# Patient Record
Sex: MALE | Race: WHITE | NOT HISPANIC OR LATINO | URBAN - METROPOLITAN AREA
[De-identification: names, ages, dates, MRNs, and addresses within clinical notes are randomized per-mention and may not be internally consistent; named-entity substitution may affect disease eponyms.]

---

## 2021-07-13 ENCOUNTER — OUTPATIENT (OUTPATIENT)
Dept: OUTPATIENT SERVICES | Facility: HOSPITAL | Age: 66
LOS: 1 days | End: 2021-07-13
Payer: MEDICARE

## 2021-07-13 ENCOUNTER — APPOINTMENT (OUTPATIENT)
Dept: MRI IMAGING | Facility: HOSPITAL | Age: 66
End: 2021-07-13
Payer: MEDICARE

## 2021-07-13 DIAGNOSIS — Z98.89 OTHER SPECIFIED POSTPROCEDURAL STATES: Chronic | ICD-10-CM

## 2021-07-13 DIAGNOSIS — M25.561 PAIN IN RIGHT KNEE: ICD-10-CM

## 2021-07-13 DIAGNOSIS — Z96.652 PRESENCE OF LEFT ARTIFICIAL KNEE JOINT: Chronic | ICD-10-CM

## 2021-07-13 DIAGNOSIS — M25.469 EFFUSION, UNSPECIFIED KNEE: ICD-10-CM

## 2021-07-13 PROCEDURE — 73721 MRI JNT OF LWR EXTRE W/O DYE: CPT

## 2021-07-13 PROCEDURE — 73721 MRI JNT OF LWR EXTRE W/O DYE: CPT | Mod: 26,RT

## 2021-07-27 ENCOUNTER — NON-APPOINTMENT (OUTPATIENT)
Age: 66
End: 2021-07-27

## 2021-08-06 ENCOUNTER — NON-APPOINTMENT (OUTPATIENT)
Age: 66
End: 2021-08-06

## 2021-08-06 ENCOUNTER — APPOINTMENT (OUTPATIENT)
Dept: ORTHOPEDIC SURGERY | Facility: CLINIC | Age: 66
End: 2021-08-06
Payer: MEDICARE

## 2021-08-06 PROCEDURE — 73562 X-RAY EXAM OF KNEE 3: CPT | Mod: 50

## 2021-08-06 PROCEDURE — 20610 DRAIN/INJ JOINT/BURSA W/O US: CPT | Mod: RT

## 2021-08-06 PROCEDURE — 99204 OFFICE O/P NEW MOD 45 MIN: CPT | Mod: 25

## 2021-08-06 NOTE — DISCUSSION/SUMMARY
[de-identified] : I discussed the underlying pathophysiology of the patient's condition in great detail with the patient. I went over the patient's x-rays with them in great detail. The extent of the patient’s arthritis was discussed in great detail with them. Various treatment modalities including cortisone injections, viscosupplementation, and surgical intervention were discussed as solutions for the patient's symptoms. The patient elected to receive a cortisone injection into his right knee today, and tolerated it well. I instructed the patient on ROM exercises, and told them to take it easy. The use of ice and rest was reviewed with the patient. The patient may resume activities tomorrow. We are requesting authorization for Monovis for the right knee. All of his questions were answered. He understands and consents to the plan.\par \par FU 2-3 weeks.\par after a right knee cortisone injection today (08/06/2021)\par after viscosupplementation authorization.

## 2021-08-06 NOTE — HISTORY OF PRESENT ILLNESS
[de-identified] : 66 year old male presents complaining of right knee pain that started 1 week ago. He denies injury. He states that he woke up with swelling and could not comfortably bend his knee. He states that weightbearing and sleeping were very uncomfortable. He has been icing and resting. His swelling and pain have improved. He still has stiffness that worsens at night. He denies buckling. He had an MRI. Of note, his left knee was replaced by Dr. Garcia about 2016. \par \par MRI of the RIGHT knee obtained at [facility] on 07/13/21 revealed:\par 1. Moderate to severe patellofemoral and mild to moderate medial tibiofemoral compartment arthrosis.\par 2. Moderate knee joint effusion with synovitis. Loose intra-articular bodies within the posterior recess of the knee located posterior the posterior cruciate ligament.\par 3. Prominent intrasubstance degeneration within the posterior horn of the medial meniscus which does not definitively contact an articular surface to constitute a tear.

## 2021-08-06 NOTE — ADDENDUM
[FreeTextEntry1] : I, Neeraj Mejias, acted solely as a scribe for Dr. Surya Garcia on this date 08/06/2021.\par All medical record entries made by the Scribe were at my, Dr. Surya Garcia, direction and personally dictated by me on 08/06/2021. I have reviewed the chart and agree that the record accurately reflects my personal performance of the history, physical exam, assessment and plan. I have also personally directed, reviewed, and agreed with the chart.

## 2021-08-06 NOTE — PHYSICAL EXAM
[de-identified] : Constitutional\par o Appearance : well-nourished, well developed, alert, in no acute distress \par Head and Face\par o Head :\par ¦ Inspection : atraumatic, normocephalic\par o Face :\par ¦ Inspection : no visible rash or discoloration\par Respiratory\par o Respiratory Effort: breathing unlabored \par Neurologic\par o Mental Status Examination :\par ¦ Orientation : grossly oriented to person, place and time\par Psychiatric\par o Mood and Affect: mood normal, affect appropriate \par \par Right Lower Extremity\par o Buttock : no tenderness, swelling or deformities \par o Right Hip :\par ¦ Inspection/Palpation : no tenderness, swelling or deformities\par ¦ Range of Motion : full and painless in all planes, no crepitance\par ¦ Stability : joint stability intact\par ¦ Strength : extension, flexion, adduction, abduction, internal rotation and external rotation 5/5 \par \par o Right Knee :\par ¦ Inspection/Palpation : no medial or lateral compartment tenderness to palpation, mild swelling\par ¦ Range of Motion : FROM, full flexion causes discomfort, no crepitance, slightly decreased patellofemoral glide \par ¦ Stability : no valgus or varus instability present on provocative testing\par ¦ Strength : flexion and extension 5/5\par ¦ Tests and Signs : Anterior Drawer negative, Lachman negative, Tisha's negative\par \par Left Lower Extremity\par o Buttock : no tenderness, swelling or deformities \par o Left Hip :\par ¦ Inspection/Palpation : no tenderness, no swelling or deformities\par ¦ Range of Motion : full and painless in all planes, no crepitance\par ¦ Stability : joint stability intact\par ¦ Strength : extension, flexion, adduction, abduction, internal rotation and external rotation 5/5\par \par o Left Knee :\par ¦ Inspection/Palpation : no tenderness to palpation, no swelling, well-healed incisions\par ¦ Range of Motion : 0-135° painless, no crepitance, good patellofemoral glide \par ¦ Stability : no valgus or varus instability present on provocative testing\par ¦ Strength : flexion and extension 5/5\par ¦ Tests and Signs : Anterior Drawer negative\par \par Gait: good alignment of the left knee with full extension, varus deformity of the right knee, no significant extremity swelling or lymphedema\par \par Radiology Results:\par o Right Knee: Standing AP, lateral, tunnel and skyline views were obtained and reveal moderate medial compartment narrowing with mild to moderate patellofemoral arthritis.\par o Left Knee : Standing AP, Lateral and skyline views of the knee were obtained and revealed excellent position of his left total knee replacement with possibly slight subsidence but no loosening. Central tracking of the patella. \par \par o Right Knee injection : Indication- knee osteoarthritis, Anatomic location- right intra-articular joint space, Spray - area was sterilized with Betadine and alcohol and anesthetized with Ethyl Chloride , needle used-20G, Medications given- 5cc's lidocaine, 0.5cc's kenalog, 0.5 cc's dexamethasone, and patient tolerated it well.

## 2021-09-10 ENCOUNTER — EMERGENCY (EMERGENCY)
Facility: HOSPITAL | Age: 66
LOS: 1 days | Discharge: HOME | End: 2021-09-10
Attending: INTERNAL MEDICINE | Admitting: INTERNAL MEDICINE
Payer: MEDICARE

## 2021-09-10 VITALS
HEIGHT: 72 IN | DIASTOLIC BLOOD PRESSURE: 78 MMHG | SYSTOLIC BLOOD PRESSURE: 159 MMHG | RESPIRATION RATE: 17 BRPM | HEART RATE: 72 BPM | TEMPERATURE: 98 F | OXYGEN SATURATION: 97 % | WEIGHT: 199.96 LBS

## 2021-09-10 DIAGNOSIS — Z98.89 OTHER SPECIFIED POSTPROCEDURAL STATES: Chronic | ICD-10-CM

## 2021-09-10 DIAGNOSIS — Z96.652 PRESENCE OF LEFT ARTIFICIAL KNEE JOINT: Chronic | ICD-10-CM

## 2021-09-10 PROCEDURE — 99284 EMERGENCY DEPT VISIT MOD MDM: CPT

## 2021-09-10 PROCEDURE — 99283 EMERGENCY DEPT VISIT LOW MDM: CPT | Mod: 25

## 2021-09-10 PROCEDURE — 96372 THER/PROPH/DIAG INJ SC/IM: CPT

## 2021-09-10 RX ORDER — LIDOCAINE 4 G/100G
1 CREAM TOPICAL
Qty: 15 | Refills: 0
Start: 2021-09-10 | End: 2021-09-24

## 2021-09-10 RX ORDER — LIDOCAINE 4 G/100G
1 CREAM TOPICAL ONCE
Refills: 0 | Status: COMPLETED | OUTPATIENT
Start: 2021-09-10 | End: 2021-09-10

## 2021-09-10 RX ORDER — KETOROLAC TROMETHAMINE 30 MG/ML
30 SYRINGE (ML) INJECTION ONCE
Refills: 0 | Status: DISCONTINUED | OUTPATIENT
Start: 2021-09-10 | End: 2021-09-10

## 2021-09-10 RX ORDER — DIAZEPAM 5 MG
1 TABLET ORAL
Qty: 8 | Refills: 0
Start: 2021-09-10 | End: 2021-09-13

## 2021-09-10 RX ORDER — DIAZEPAM 5 MG
5 TABLET ORAL ONCE
Refills: 0 | Status: DISCONTINUED | OUTPATIENT
Start: 2021-09-10 | End: 2021-09-10

## 2021-09-10 RX ADMIN — Medication 30 MILLIGRAM(S): at 15:24

## 2021-09-10 RX ADMIN — LIDOCAINE 1 PATCH: 4 CREAM TOPICAL at 14:57

## 2021-09-10 RX ADMIN — Medication 30 MILLIGRAM(S): at 14:57

## 2021-09-10 RX ADMIN — Medication 5 MILLIGRAM(S): at 14:58

## 2021-09-10 NOTE — ED PROVIDER NOTE - CLINICAL SUMMARY MEDICAL DECISION MAKING FREE TEXT BOX
67 y/o M h/o Glaucoma pw lower left buttocks pain radiating into hip and upper left thigh x 2 days upon awakening. Denies fever, chills, headache, neck pain, numbness, tingling, bowel/urinary incontinence Denies known injury/trauma, heavy lifting, prior episodes. Took Aleve 5 hours ago without relief. Likely sciatica.   Plan: Will give Toradol, Valium, Lidoderm patch and reassess 67 y/o M h/o Glaucoma pw lower left buttocks pain radiating into hip and upper left thigh x 2 days upon awakening. Denies fever, chills, headache, neck pain, numbness, tingling, bowel/urinary incontinence Denies known injury/trauma, heavy lifting, prior episodes. Took Aleve 5 hours ago without relief. Likely sciatica.   Plan: Will give Toradol, Valium, Lidoderm patch and reassess  **update: significant improvement in pain s/p med administration. patient has an orthopedist he will follow up with. Strict ED return precautions discussed. Patient understands and agrees.

## 2021-09-10 NOTE — ED PROVIDER NOTE - NSFOLLOWUPINSTRUCTIONS_ED_ALL_ED_FT
Follow with your PMD within 48-72 hours.    Rest, no heavy lifting.    Warm compresses to area.  Light walking.   Take Motrin 600 mg every 6-8 hours as needed for pain with food   Valium 5 mg every 12 hours as needed for muscle spasm- caution drowsiness/do not drive.   Worsening, continued or ANY new concerning symptoms return to the emergency department   If your symptoms are not improving you should consult an Orthopedist- you can call: Find a Physician helpline ([phone]) for assistance     Lumbosacral Strain    Lumbosacral strain is an injury that causes pain in the lower back (lumbosacral spine). This injury usually occurs from overstretching the muscles or ligaments along your spine. A strain can affect one or more muscles or cord-like tissues that connect bones to other bones (ligaments).    What are the causes?  This condition may be caused by:  A hard, direct hit (blow) to the back. Excessive stretching of the lower back muscles. This may result from:  A fall. Lifting something heavy. Repetitive movements such as bending or crouching.     What increases the risk?  The following factors may increase your risk of getting this condition:  Participating in sports or activities that involve:  A sudden twist of the back. Pushing or pulling motions. Being overweight or obese. Having poor strength and flexibility, especially tight hamstrings or weak muscles in the back or abdomen. Having too much of a curve in the lower back. Having a pelvis that is tilted forward. What are the signs or symptoms?  The main symptom of this condition is pain in the lower back, at the site of the strain. Pain may extend (radiate) down one or both legs.    How is this diagnosed?  This condition is diagnosed based on:  Your symptoms. Your medical history. A physical exam.  Your health care provider may push on certain areas of your back to determine the source of your pain. You may be asked to bend forward, backward, and side to side to assess the severity of your pain and your range of motion. Imaging tests, such as:  X-rays. MRI.    How is this treated?  Treatment for this condition may include:  Putting heat and cold on the affected area. Medicines to help relieve pain and relax your muscles (muscle relaxants). NSAIDs to help reduce swelling and discomfort. When your symptoms improve, it is important to gradually return to your normal routine as soon as possible to reduce pain, avoid stiffness, and avoid loss of muscle strength. Generally, symptoms should improve within 6 weeks of treatment. However, recovery time varies.    Follow these instructions at home:    Managing pain, stiffness, and swelling     If directed, put ice on the injured area during the first 24 hours after your strain.  Put ice in a plastic bag. Place a towel between your skin and the bag. Leave the ice on for 20 minutes, 2–3 times a day. If directed, put heat on the affected area as often as told by your health care provider. Use the heat source that your health care provider recommends, such as a moist heat pack or a heating pad.  Place a towel between your skin and the heat source. Leave the heat on for 20–30 minutes. Remove the heat if your skin turns bright red. This is especially important if you are unable to feel pain, heat, or cold. You may have a greater risk of getting burned.    Rest and return to your normal activities as told by your health care provider. Ask your health care provider what activities are safe for you.Avoid activities that take a lot of energy for as long as told by your health care provider.General instructions     Take over-the-counter and prescription medicines only as told by your health care provider. Do not drive or use heavy machinery while taking prescription pain medicine. Do not use any products that contain nicotine or tobacco, such as cigarettes and e-cigarettes. If you need help quitting, ask your health care provider. Keep all follow-up visits as told by your health care provider. This is important.How is this prevented?  Use correct form when playing sports and lifting heavy objects. Use good posture when sitting and standing. Maintain a healthy weight. Sleep on a mattress with medium firmness to support your back.Be safe and responsible while being active to avoid falls. Do at least 150 minutes of moderate-intensity exercise each week, such as brisk walking or water aerobics. Try a form of exercise that takes stress off your back, such as swimming or stationary cycling. Maintain physical fitness, including:  Strength.Flexibility.Cardiovascular fitness.Endurance.     Contact a health care provider if:  Your back pain does not improve after 6 weeks of treatment. Your symptoms get worse. Get help right away if:  Your back pain is severe. You cannot stand or walk. You have difficulty controlling when you urinate or when you have a bowel movement. You feel nauseous or you vomit. Your feet get very cold. You have numbness, tingling, weakness, or problems using your arms or legs. You develop any of the following:  Shortness of breath. Dizziness. Pain in your legs. Weakness in your buttocks or legs. Follow with your PMD within 48-72 hours.    Rest, no heavy lifting.    Warm compresses to area.  Light walking.   Take Naproxen 500mg 1 tab 2x/day with food.   Valium 5 mg every 12 hours as needed for muscle spasm- caution drowsiness/do not drive.   Worsening, continued or ANY new concerning symptoms return to the emergency department   If your symptoms are not improving you should consult an Orthopedist- you can call: Find a Physician helpline ([phone]) for assistance       Sciatica    WHAT YOU NEED TO KNOW:    Sciatica is a condition that causes pain along your sciatic nerve. The sciatic nerve runs from your spine through both sides of your buttocks. It then runs down the back of your thigh, into your lower leg and foot. Your sciatic nerve may be compressed, inflamed, irritated, or stretched.    DISCHARGE INSTRUCTIONS:    Medicines:   •NSAIDs: These medicines decrease swelling and pain. NSAIDs are available without a doctor's order. Ask your healthcare provider which medicine is right for you. Ask how much to take and when to take it. Take as directed. NSAIDs can cause stomach bleeding or kidney problems if not taken correctly.      •Acetaminophen: This medicine decreases pain. Acetaminophen is available without a doctor's order. Ask how much to take and when to take it. Follow directions. Acetaminophen can cause liver damage if not taken correctly.      •Muscle relaxers help decrease pain and muscle spasms.      •Take your medicine as directed. Contact your healthcare provider if you think your medicine is not helping or if you have side effects. Tell him or her if you are allergic to any medicine. Keep a list of the medicines, vitamins, and herbs you take. Include the amounts, and when and why you take them. Bring the list or the pill bottles to follow-up visits. Carry your medicine list with you in case of an emergency.      Follow up with your doctor as directed: Write down your questions so you remember to ask them during your visits.     Manage your symptoms:   •Activity: Decrease your activity. Do not lift heavy objects or twist your back for at least 6 weeks. Slowly return to your usual activity.      •Ice: Ice helps decrease swelling and pain. Ice may also help prevent tissue damage. Use an ice pack, or put crushed ice in a plastic bag. Cover it with a towel and place it on your low back or leg for 15 to 20 minutes every hour or as directed.      •Heat: Heat helps decrease pain and muscle spasms. Apply heat on the area for 20 to 30 minutes every 2 hours for as many days as directed.       •Physical therapy: You may need to see a physical therapist to teach you exercises to help improve movement and strength, and to decrease pain. An occupational therapist teaches you skills to help with your daily activities.       •Use assistive devices if directed: You may need to wear back support, such as a back brace. You may need crutches, a cane, or a walker to decrease stress on your lower back and leg muscles. Ask your healthcare provider for more information about assistive devices and how to use them correctly.      Self-care:   •Avoid pressure on your back and legs: Do not lift heavy objects, or stand or sit for long periods of time.      •Lift objects safely: Keep your back straight and bend your knees when you  an object. Do not bend or twist your back when you lift.      •Maintain a healthy weight: Ask your healthcare provider how much you should weigh. Ask him to help you create a weight loss plan if you are overweight.       •Exercise: Ask your healthcare provider about the best stretching, warmup, and exercise plan for you.       Contact your healthcare provider if:   •You have pain in your lower back at night or when resting.      •You have pain in your lower back with numbness below the knee.      •You have weakness in one leg only.      •You have questions or concerns about your condition or care.      Seek care immediately or call 911 if:   •You have trouble holding back your urine or bowel movements.      •You have weakness in both legs.      •You have numbness in your groin or buttocks.

## 2021-09-10 NOTE — ED ADULT NURSE NOTE - NSICDXPASTMEDICALHX_GEN_ALL_CORE_FT
PAST MEDICAL HISTORY:  Elevated liver enzymes s/p tylenol use after TKR    Glaucoma     Knee pain, left s/p TKR 8/15    Osteoarthritis

## 2021-09-10 NOTE — ED PROVIDER NOTE - PATIENT PORTAL LINK FT
You can access the FollowMyHealth Patient Portal offered by [facility] by registering at the following website: [email or web address]. By joining [facility]’s FollowMyHealth portal, you will also be able to view your health information using other applications (apps) compatible with our system.

## 2021-09-10 NOTE — ED ADULT NURSE NOTE - NSICDXPASTSURGICALHX_GEN_ALL_CORE_FT
PAST SURGICAL HISTORY:  S/P colonoscopy with polypectomy benign, 2 years ago    S/P inguinal hernia repair right    S/P knee surgery left knee at age 15    Status post total left knee replacement 8/2015

## 2021-09-10 NOTE — ED ADULT NURSE NOTE - NSICDXFAMILYHX_GEN_ALL_CORE_FT
FAMILY HISTORY:  Father  Still living? No  Family history of heart disease, Age at diagnosis: Age Unknown    Mother  Still living? Unknown  Family history of brain cancer, Age at diagnosis: Age Unknown

## 2021-09-10 NOTE — ED PROVIDER NOTE - ATTENDING CONTRIBUTION TO CARE
67 y/o M h/o Glaucoma pw lower left buttocks pain radiating into hip and upper left thigh x 2 days upon awakening. Denies fever, chills, headache, neck pain, numbness, tingling, bowel/urinary incontinence Denies known injury/trauma, heavy lifting, prior episodes. Took Aleve 5 hours ago without relief. Likely sciatica.   Plan: Will give Toradol, Valium, Lidoderm patch and reassess  **update: significant improvement in pain s/p med administration. patient has an orthopedist he will follow up with. Strict ED return precautions discussed. Patient understands and agrees.  Dr. Guerin:  I have reviewed and discussed with the PA/ resident the case specifics, including the history, physical assessment, evaluation, conclusion, laboratory results, and medical plan. I agree with the contents, and conclusions. I have personally examined, and interviewed the patient.

## 2021-09-10 NOTE — ED PROVIDER NOTE - OBJECTIVE STATEMENT
65 y/o M h/o Glaucoma pw lower left buttocks pain radiating into hip and upper left thigh x 2 days upon awakening. Denies fever, chills, headache, neck pain, numbness, tingling, bowel/urinary incontinence Denies known injury/trauma, heavy lifting, prior episodes. Took Aleve 5 hours ago without relief.

## 2021-09-14 NOTE — CHART NOTE - NSCHARTNOTEFT_GEN_A_CORE
SW met with patient at bedside to assess for social work needs and to complete home assessment of safety.  Patient is a 65 y/o male presenting to ED for back pain.  Patient reports he has 2 steps to enter home with handrail and 0 steps to bedroom.  Patient reports at baseline he is independent and does not use DME.  Currently in ED patient has crutch to assist with ambulating.  Patient reports it has been helping him and he will continue to use crutch while in pain.  Patient is aware he needs to follow up with orthopedist.  SW offered to assist patient with scheduling. SW will contact patient on Monday to follow up and schedule. Patient in agreement.  Patient denies any safety concerns or need for outside referrals.  Fall prevention checklist and suggestions discussed - copy was provided to patient.  SW will contact patient on Monday 9/13/21 at [phone].
SW called patient to discuss and assist with follow up care.  Patient presented to ED on 9/10/21 with back pain.  Patient was instructed to follow up with PMD.  SW spoke with patient who reports still feeling a bit of pain.  Patient reports that he has not scheduled follow up with PMD but has spoken with his orthopedist.  Patient reports he is waiting on a phone call back to see when orthopedist is available to give a shot in his knee.  Patient denies needing any other assistance.  Fall safety and concerns were discussed in ED. No social work assistance is needed at this time.

## 2021-09-30 ENCOUNTER — EMERGENCY (EMERGENCY)
Facility: HOSPITAL | Age: 66
LOS: 1 days | Discharge: HOME | End: 2021-09-30
Attending: EMERGENCY MEDICINE | Admitting: EMERGENCY MEDICINE
Payer: MEDICARE

## 2021-09-30 VITALS
SYSTOLIC BLOOD PRESSURE: 148 MMHG | HEART RATE: 61 BPM | TEMPERATURE: 98 F | OXYGEN SATURATION: 98 % | DIASTOLIC BLOOD PRESSURE: 84 MMHG | RESPIRATION RATE: 17 BRPM

## 2021-09-30 VITALS
DIASTOLIC BLOOD PRESSURE: 105 MMHG | RESPIRATION RATE: 18 BRPM | HEIGHT: 72 IN | WEIGHT: 199.96 LBS | HEART RATE: 60 BPM | TEMPERATURE: 98 F | OXYGEN SATURATION: 95 % | SYSTOLIC BLOOD PRESSURE: 176 MMHG

## 2021-09-30 DIAGNOSIS — M54.42 LUMBAGO WITH SCIATICA, LEFT SIDE: ICD-10-CM

## 2021-09-30 DIAGNOSIS — M25.552 PAIN IN LEFT HIP: ICD-10-CM

## 2021-09-30 DIAGNOSIS — Z96.652 PRESENCE OF LEFT ARTIFICIAL KNEE JOINT: Chronic | ICD-10-CM

## 2021-09-30 DIAGNOSIS — Z98.89 OTHER SPECIFIED POSTPROCEDURAL STATES: Chronic | ICD-10-CM

## 2021-09-30 DIAGNOSIS — H40.9 UNSPECIFIED GLAUCOMA: ICD-10-CM

## 2021-09-30 DIAGNOSIS — Z96.652 PRESENCE OF LEFT ARTIFICIAL KNEE JOINT: ICD-10-CM

## 2021-09-30 PROCEDURE — 99283 EMERGENCY DEPT VISIT LOW MDM: CPT

## 2021-09-30 PROCEDURE — 73502 X-RAY EXAM HIP UNI 2-3 VIEWS: CPT | Mod: 26,LT

## 2021-09-30 PROCEDURE — 73502 X-RAY EXAM HIP UNI 2-3 VIEWS: CPT

## 2021-09-30 PROCEDURE — 99284 EMERGENCY DEPT VISIT MOD MDM: CPT

## 2021-09-30 RX ORDER — LIDOCAINE 4 G/100G
1 CREAM TOPICAL ONCE
Refills: 0 | Status: COMPLETED | OUTPATIENT
Start: 2021-09-30 | End: 2021-09-30

## 2021-09-30 RX ORDER — CYCLOBENZAPRINE HYDROCHLORIDE 10 MG/1
5 TABLET, FILM COATED ORAL ONCE
Refills: 0 | Status: COMPLETED | OUTPATIENT
Start: 2021-09-30 | End: 2021-09-30

## 2021-09-30 RX ORDER — LIDOCAINE 4 G/100G
1 CREAM TOPICAL
Qty: 7 | Refills: 0
Start: 2021-09-30 | End: 2021-10-06

## 2021-09-30 RX ORDER — OXYCODONE AND ACETAMINOPHEN 5; 325 MG/1; MG/1
1 TABLET ORAL ONCE
Refills: 0 | Status: DISCONTINUED | OUTPATIENT
Start: 2021-09-30 | End: 2021-09-30

## 2021-09-30 RX ADMIN — LIDOCAINE 1 PATCH: 4 CREAM TOPICAL at 11:06

## 2021-09-30 RX ADMIN — OXYCODONE AND ACETAMINOPHEN 1 TABLET(S): 5; 325 TABLET ORAL at 11:06

## 2021-09-30 RX ADMIN — OXYCODONE AND ACETAMINOPHEN 1 TABLET(S): 5; 325 TABLET ORAL at 11:53

## 2021-09-30 RX ADMIN — CYCLOBENZAPRINE HYDROCHLORIDE 5 MILLIGRAM(S): 10 TABLET, FILM COATED ORAL at 11:08

## 2021-09-30 NOTE — ED PROVIDER NOTE - PHYSICAL EXAMINATION
msk: pelvis stable. FROM of the left hip. No point TTP. With ambulation and SLR pt reports pain in the left hip radiating down the leg

## 2021-09-30 NOTE — ED PROVIDER NOTE - CLINICAL SUMMARY MEDICAL DECISION MAKING FREE TEXT BOX
67 y/o M h/o Glaucoma presents to the ED with c/o sharp shooting pain from the left hip down the left thigh and lower leg x 3 weeks. Pt was seen in the ED 3 weeks ago when it started and was given meds, which helped temporarily. He has followed up with pain management, reports he had an xray of his lower back which was normal and was given flexeril, medrol dose pack and naprosyn, which doesn't help, he was also given "3 injections" in the left hip yesterday (unsure of the medication names) which did not help with his pain. Denies fever, chills, headache, neck pain, numbness, tingling, bowel/urinary incontinence. Denies known injury/trauma, heavy lifting, prior episodes. PE as noted above. hip XR pending. pain control. reassess 67 y/o M h/o Glaucoma presents to the ED with c/o sharp shooting pain from the left hip down the left thigh and lower leg x 3 weeks. Pt was seen in the ED 3 weeks ago when it started and was given meds, which helped temporarily. He has followed up with pain management, reports he had an xray of his lower back which was normal and was given flexeril, medrol dose pack and naprosyn, which doesn't help, he was also given "3 injections" in the left hip yesterday (unsure of the medication names) which did not help with his pain. Denies fever, chills, headache, neck pain, numbness, tingling, bowel/urinary incontinence. Denies known injury/trauma, heavy lifting, prior episodes. PE as noted above. hip XR pending. pain control. reassess    1232pm: XR images reviewed with Dr. Valencia- bebe rousseau. pts pain is improved with percocet and lidoderm patch. pt has an appt with PT tomorrow and his orthopedic Dr. Garcia on 10/7. MARY flores will try to get his appt moved up. He also has an appt for MRI upcoming scheduled by his pain management 65 y/o M h/o Glaucoma presents to the ED with c/o sharp shooting pain from the left hip down the left thigh and lower leg x 3 weeks. Pt was seen in the ED 3 weeks ago when it started and was given meds, which helped temporarily. He has followed up with pain management, reports he had an xray of his lower back which was normal and was given flexeril, medrol dose pack and naprosyn, which doesn't help, he was also given "3 injections" in the left hip yesterday (unsure of the medication names) which did not help with his pain. Denies fever, chills, headache, neck pain, numbness, tingling, bowel/urinary incontinence. Denies known injury/trauma, heavy lifting, prior episodes. PE as noted above. hip XR pending. pain control. reassess    1232pm: XR images reviewed with Dr. Valencia- bebe rousseau. pts pain is improved with percocet and lidoderm patch. pt has an appt with PT tomorrow and his orthopedic Dr. Garcia on 10/7. MARY flores will try to get his appt moved up. He also has an appt for MRI upcoming scheduled by his pain management    Appt with Hip Specialist made by Ebony. Pt made aware.

## 2021-09-30 NOTE — ED ADULT TRIAGE NOTE - CHIEF COMPLAINT QUOTE
Pt c/o lower back pain radiating to left leg. Pt c/o lower back pain radiating to left leg. ISAR Negative.

## 2021-09-30 NOTE — ED PROVIDER NOTE - ATTENDING CONTRIBUTION TO CARE
Christiano with MARYJANE Phillips. 67 y/o M h/o Glaucoma presents to the ED with c/o sharp shooting pain from the left hip down the left thigh and lower leg x 3 weeks. Pt was seen in the ED 3 weeks ago when it started and was given meds, which helped temporarily. He has followed up with pain management, reports he had an xray of his lower back which was normal and was given flexeril, medrol dose pack and naprosyn, which doesn't help, he was also given "3 injections" in the left hip yesterday (unsure of the medication names) which did not help with his pain. Denies fever, chills, headache, neck pain, numbness, tingling, bowel/urinary incontinence. Denies known injury/trauma, heavy lifting, prior episodes. PE as noted above. hip XR pending. pain control. reassess    1232pm: XR images reviewed with Dr. Valencia- bebe rousseau. pts pain is improved with percocet and lidoderm patch. pt has an appt with PT tomorrow and his orthopedic Dr. Garcia on 10/7. MARY flores will try to get his appt moved up. He also has an appt for MRI upcoming scheduled by his pain management    Appt with Hip Specialist made by Ebony. Pt made aware.     I performed a face to face bedside interview with patient regarding history of present illness, review of symptoms and past medical history. I completed an independent physical exam.  I have discussed the patient's plan of care with Physician Assistant (PA). I agree with note as stated above, having amended the EMR as needed to reflect my findings.   This includes History of Present Illness, HIV, Past Medical/Surgical/Family/Social History, Allergies and Home Medications, Review of Systems, Physical Exam, and any Progress Notes during the time I functioned as the attending physician for this patient.

## 2021-09-30 NOTE — ED PROVIDER NOTE - NSFOLLOWUPINSTRUCTIONS_ED_ALL_ED_FT
Follow up with your orthopedic specialist as scheduled. Follow up with your physical therapy tomorrow as scheduled     Take the prescribed medication as directed     Stay hydrated    Return to the ER if your symptoms worsen or for any other medical emergencies  ******************    Sciatica       Sciatica is pain, weakness, tingling, or loss of feeling (numbness) along the sciatic nerve. The sciatic nerve starts in the lower back and goes down the back of each leg. Sciatica usually goes away on its own or with treatment. Sometimes, sciatica may come back (recur).      What are the causes?  This condition happens when the sciatic nerve is pinched or has pressure put on it. This may be the result of:  •A disk in between the bones of the spine bulging out too far (herniated disk).      •Changes in the spinal disks that occur with aging.      •A condition that affects a muscle in the butt.      •Extra bone growth near the sciatic nerve.      •A break (fracture) of the area between your hip bones (pelvis).      •Pregnancy.       •Tumor. This is rare.        What increases the risk?  You are more likely to develop this condition if you:  •Play sports that put pressure or stress on the spine.      •Have poor strength and ease of movement (flexibility).      •Have had a back injury in the past.      •Have had back surgery.      •Sit for long periods of time.      •Do activities that involve bending or lifting over and over again.      •Are very overweight (obese).        What are the signs or symptoms?  Symptoms can vary from mild to very bad. They may include:•Any of these problems in the lower back, leg, hip, or butt:  •Mild tingling, loss of feeling, or dull aches.      •Burning sensations.      •Sharp pains.         •Loss of feeling in the back of the calf or the sole of the foot.      •Leg weakness.       •Very bad back pain that makes it hard to move.      These symptoms may get worse when you cough, sneeze, or laugh. They may also get worse when you sit or stand for long periods of time.      How is this treated?  This condition often gets better without any treatment. However, treatment may include:  •Changing or cutting back on physical activity when you have pain.      •Doing exercises and stretching.      •Putting ice or heat on the affected area.    •Medicines that help:   •To relieve pain and swelling.       •To relax your muscles.         •Shots (injections) of medicines that help to relieve pain, irritation, and swelling.      •Surgery.        Follow these instructions at home:    Medicines     •Take over-the-counter and prescription medicines only as told by your doctor.    •Ask your doctor if the medicine prescribed to you:  •Requires you to avoid driving or using heavy machinery.    •Can cause trouble pooping (constipation). You may need to take these steps to prevent or treat trouble pooping:  •Drink enough fluids to keep your pee (urine) pale yellow.      •Take over-the-counter or prescription medicines.      •Eat foods that are high in fiber. These include beans, whole grains, and fresh fruits and vegetables.      •Limit foods that are high in fat and sugar. These include fried or sweet foods.            Managing pain                 •If told, put ice on the affected area.  •Put ice in a plastic bag.      •Place a towel between your skin and the bag.      •Leave the ice on for 20 minutes, 2–3 times a day.      •If told, put heat on the affected area. Use the heat source that your doctor tells you to use, such as a moist heat pack or a heating pad.  •Place a towel between your skin and the heat source.      •Leave the heat on for 20–30 minutes.      •Remove the heat if your skin turns bright red. This is very important if you are unable to feel pain, heat, or cold. You may have a greater risk of getting burned.          Activity      •Return to your normal activities as told by your doctor. Ask your doctor what activities are safe for you.      •Avoid activities that make your symptoms worse.    •Take short rests during the day.  •When you rest for a long time, do some physical activity or stretching between periods of rest.      •Avoid sitting for a long time without moving. Get up and move around at least one time each hour.        •Exercise and stretch regularly, as told by your doctor.    • Do not lift anything that is heavier than 10 lb (4.5 kg) while you have symptoms of sciatica.  •Avoid lifting heavy things even when you do not have symptoms.      •Avoid lifting heavy things over and over.      •When you lift objects, always lift in a way that is safe for your body. To do this, you should:  •Bend your knees.      •Keep the object close to your body.      •Avoid twisting.        General instructions     •Stay at a healthy weight.      •Wear comfortable shoes that support your feet. Avoid wearing high heels.      •Avoid sleeping on a mattress that is too soft or too hard. You might have less pain if you sleep on a mattress that is firm enough to support your back.      •Keep all follow-up visits as told by your doctor. This is important.        Contact a doctor if:  •You have pain that:  •Wakes you up when you are sleeping.      •Gets worse when you lie down.      •Is worse than the pain you have had in the past.      •Lasts longer than 4 weeks.        •You lose weight without trying.        Get help right away if:    •You cannot control when you pee (urinate) or poop (have a bowel movement).    •You have weakness in any of these areas and it gets worse:  •Lower back.      •The area between your hip bones.      •Butt.      •Legs.        •You have redness or swelling of your back.      •You have a burning feeling when you pee.        Summary    •Sciatica is pain, weakness, tingling, or loss of feeling (numbness) along the sciatic nerve.      •This condition happens when the sciatic nerve is pinched or has pressure put on it.      •Sciatica can cause pain, tingling, or loss of feeling (numbness) in the lower back, legs, hips, and butt.      •Treatment often includes rest, exercise, medicines, and putting ice or heat on the affected area.      This information is not intended to replace advice given to you by your health care provider. Make sure you discuss any questions you have with your health care provider.

## 2021-09-30 NOTE — ED PROVIDER NOTE - MDM ORDERS SUBMITTED SELECTION
Imaging Studies/Medications
What Type Of Note Output Would You Prefer (Optional)?: Bullet Format
How Severe Are Your Spot(S)?: mild
Have Your Spot(S) Been Treated In The Past?: has not been treated
Hpi Title: Evaluation of Skin Lesions

## 2021-09-30 NOTE — ED PROVIDER NOTE - PATIENT PORTAL LINK FT
You can access the FollowMyHealth Patient Portal offered by [facility] by registering at the following website: [email or web address]. By joining [facility]’s FollowMyHealth portal, you will also be able to view your health information using other applications (apps) compatible with our system.

## 2021-09-30 NOTE — ED PROVIDER NOTE - OBJECTIVE STATEMENT
67 y/o M h/o Glaucoma presents to the ED with c/o sharp shooting pain from the left hip down the left thigh and lower leg x 3 weeks. Pt was seen in the ED 3 weeks ago when it started and was given meds, which helped temporarily. He has followed up with pain management, reports he had an xray of his lower back which was normal and was given flexeril, medrol dose pack and naprosyn, which doesn't help, he was also given "3 injections" in the left hip yesterday (unsure of the medication names) which did not help with his pain. Denies fever, chills, headache, neck pain, numbness, tingling, bowel/urinary incontinence. Denies known injury/trauma, heavy lifting, prior episodes.

## 2021-10-01 ENCOUNTER — APPOINTMENT (OUTPATIENT)
Dept: ORTHOPEDIC SURGERY | Facility: CLINIC | Age: 66
End: 2021-10-01
Payer: MEDICARE

## 2021-10-01 DIAGNOSIS — M16.12 UNILATERAL PRIMARY OSTEOARTHRITIS, LEFT HIP: ICD-10-CM

## 2021-10-01 DIAGNOSIS — M54.5 LOW BACK PAIN: ICD-10-CM

## 2021-10-01 PROCEDURE — 72100 X-RAY EXAM L-S SPINE 2/3 VWS: CPT

## 2021-10-01 PROCEDURE — 73502 X-RAY EXAM HIP UNI 2-3 VIEWS: CPT | Mod: LT

## 2021-10-01 PROCEDURE — 99214 OFFICE O/P EST MOD 30 MIN: CPT

## 2021-10-04 NOTE — CHART NOTE - NSCHARTNOTEFT_GEN_A_CORE
SW called patient to discuss and assist with follow up care.  Patient presented to ED on 9/30/21 with hip pain.  While in ED,  MARY scheduled patient follow up appointment for 10/1/21 at 4 PM with Dr Choe located at [address] [place] [address].  Patient in agreement to attend appointment.  Patient did not answer follow up call.    According to SASHA, patient attended appointment on 10/1/21.  Patient also completed MRI on 10/2/21 and has another follow up appointment scheduled for 10/5/21.
SW met with patient at bedside to assess for social work needs.  Patient is a 66 year old male presenting to ED with hip pain.  SW had met with patient during ED visit 3 weeks ago and completed home assessment of safety.  Patient denies any changes in safety concerns.  Patient denies needing any resources or referrals.  MARY spoke with Dr Valencia and MARYJANE Phillips regarding follow up care.  Patient is to follow up with orthopedist.  SW spoke with patient about SW scheduling appointment for patient before DC.  Patient in agreement.  Patient states he has an appointment with orthopedist Dr Garcia on 10/7/21 for his knee but does not want to wait that long for his hip.  Patient requested to go to same practice but is open to seeing another Dr for earlier availability.  MARY called [facility] [place] [phone].  MARY scheduled patient follow up appointment for 10/1/21 at 4 PM with Dr Choe located at [address] [place] [address].  Patient in agreement to attend appointment; appointment card given to patient.  ED provider made aware of appointment. SW will follow up to confirm patient attended appointment.

## 2021-10-06 PROBLEM — M16.12 PRIMARY OSTEOARTHRITIS OF LEFT HIP: Status: ACTIVE | Noted: 2021-10-01

## 2021-10-07 ENCOUNTER — APPOINTMENT (OUTPATIENT)
Dept: ORTHOPEDIC SURGERY | Facility: CLINIC | Age: 66
End: 2021-10-07
Payer: MEDICARE

## 2021-10-07 PROCEDURE — 20611 DRAIN/INJ JOINT/BURSA W/US: CPT | Mod: RT

## 2021-10-07 NOTE — DISCUSSION/SUMMARY
[de-identified] : I went over the pathophysiology of the patient's symptoms in great detail with the patient. The patient elected to receive a Durolane injection into his right knee today, and tolerated it well. I instructed the pt on ROM exercises, and told them to take it easy. The use of ice and rest was reviewed with the patient. The patient may resume activities tomorrow. I reminded the patient that it takes 4 to 6 weeks after the injection to feel symptom relief. All of his questions were answered. He understands and consents to the plan. \par \par FU 6 weeks.\par after a right knee Durolane injection today (10/07/2021).

## 2021-10-07 NOTE — ADDENDUM
[FreeTextEntry1] : I, Neeraj Mejias, acted solely as a scribe for Dr. Surya Garcia on this date 10/07/2021.\par All medical record entries made by the Scribe were at my, Dr. Surya Garcia, direction and personally dictated by me on 10/07/2021. I have reviewed the chart and agree that the record accurately reflects my personal performance of the history, physical exam, assessment and plan. I have also personally directed, reviewed, and agreed with the chart.

## 2021-10-07 NOTE — HISTORY OF PRESENT ILLNESS
[de-identified] : 66 year old male presents with right knee pain. It started 5 weeks ago without injury. He states that he woke up with swelling and could not comfortably bend his knee. Weightbearing and sleeping were very uncomfortable. He denies buckling. He received a cortisone injection on 8/6/2021 and thinks that it helped. He presents for a Durolane injection today (10/07/2021). He notes that 1 week after his cortisone injection his back pain flared up. He is seeing Dr. Marshall tomorrow for this. Pmhx: His left knee was replaced by Dr. Garcia about 2016.\par \par Radiology Results taken 8/6/2021:\par o Right Knee: Standing AP, lateral, tunnel and skyline views were obtained and reveal moderate medial compartment narrowing with mild to moderate patellofemoral arthritis.\par o Left Knee : Standing AP, Lateral and skyline views of the knee were obtained and revealed excellent position of his left total knee replacement with possibly slight subsidence but no loosening. Central tracking of the patella. \par \par MRI of the RIGHT knee obtained at [facility] on 07/13/21 revealed:\par 1. Moderate to severe patellofemoral and mild to moderate medial tibiofemoral compartment arthrosis.\par 2. Moderate knee joint effusion with synovitis. Loose intra-articular bodies within the posterior recess of the knee located posterior the posterior cruciate ligament.\par 3. Prominent intrasubstance degeneration within the posterior horn of the medial meniscus which does not definitively contact an articular surface to constitute a tear.

## 2021-10-07 NOTE — PHYSICAL EXAM
[de-identified] : Constitutional\par o Appearance : well-nourished, well developed, alert, in no acute distress \par Head and Face\par o Head :\par ¦ Inspection : atraumatic, normocephalic\par o Face :\par ¦ Inspection : no visible rash or discoloration\par Respiratory\par o Respiratory Effort: breathing unlabored \par Neurologic\par o Mental Status Examination :\par ¦ Orientation : grossly oriented to person, place and time\par Psychiatric\par o Mood and Affect: mood normal, affect appropriate \par \par Right Lower Extremity\par o Buttock : no tenderness, swelling or deformities \par o Right Hip :\par ¦ Inspection/Palpation : no tenderness, swelling or deformities\par ¦ Range of Motion : full and painless in all planes, no crepitance\par ¦ Stability : joint stability intact\par ¦ Strength : extension, flexion, adduction, abduction, internal rotation and external rotation 5/5 \par \par o Right Knee :\par ¦ Inspection/Palpation : no medial or lateral compartment tenderness to palpation, no  swelling\par ¦ Range of Motion : FROM, full flexion causes  mild discomfort, no crepitance, slightly decreased patellofemoral glide \par ¦ Stability : no valgus or varus instability present on provocative testing\par ¦ Strength : flexion and extension 5/5\par ¦ Tests and Signs : Anterior Drawer negative, Lachman negative, Tisha's negative\par \par Left Lower Extremity\par o Buttock : no tenderness, swelling or deformities \par o Left Hip :\par ¦ Inspection/Palpation : no tenderness, no swelling or deformities\par ¦ Range of Motion : full and painless in all planes, no crepitance\par ¦ Stability : joint stability intact\par ¦ Strength : extension, flexion, adduction, abduction, internal rotation and external rotation 5/5\par \par o Left Knee :\par ¦ Inspection/Palpation : no tenderness to palpation, no swelling, well-healed incisions\par ¦ Range of Motion : 0-135° painless, no crepitance, good patellofemoral glide \par ¦ Stability : no valgus or varus instability present on provocative testing\par ¦ Strength : flexion and extension 5/5\par ¦ Tests and Signs : Anterior Drawer negative\par \par Gait: good alignment of the left knee with full extension, varus deformity of the right knee, no significant extremity swelling or lymphedema\par \par o Right Knee injection : Indication- knee osteoarthritis, Anatomic location- right intra-articular joint space, Spray - area was sterilized with Betadine and alcohol and anesthetized with Ethyl Chloride, needle used-20G, Medications given- 3cc's Durolane under Ultrasound guidance. \par Go# [id number]   o Exp: 2023-12-31

## 2021-10-08 ENCOUNTER — APPOINTMENT (OUTPATIENT)
Dept: ORTHOPEDIC SURGERY | Facility: CLINIC | Age: 66
End: 2021-10-08
Payer: MEDICARE

## 2021-10-08 VITALS
WEIGHT: 200 LBS | HEIGHT: 72 IN | BODY MASS INDEX: 27.09 KG/M2 | DIASTOLIC BLOOD PRESSURE: 85 MMHG | SYSTOLIC BLOOD PRESSURE: 213 MMHG | HEART RATE: 72 BPM

## 2021-10-08 DIAGNOSIS — Z86.69 PERSONAL HISTORY OF OTHER DISEASES OF THE NERVOUS SYSTEM AND SENSE ORGANS: ICD-10-CM

## 2021-10-08 PROCEDURE — 72100 X-RAY EXAM L-S SPINE 2/3 VWS: CPT

## 2021-10-08 PROCEDURE — 99215 OFFICE O/P EST HI 40 MIN: CPT

## 2021-10-08 NOTE — HISTORY OF PRESENT ILLNESS
[de-identified] : This 66-year-old retired  had a short-lived episode of sciatic pains 30 years ago.  1 month ago he had the spontaneous onset of pain in the left buttock with radiation to the left posterior thigh and calf.  He has had some very mild back pain.  He has not had right leg pain.  He has had some numbness in the left leg but denies paresthesias or weakness.  The pain is worse with coughing and sneezing but no worse forcing to move his bowels.  He has had night pain.  The pain is no worse sitting or driving but it is slightly worse standing and worse walking.  Treatment has been ice.  He has seen a chiropractor and had injections from someone in their office and Naprosyn and Flexeril were prescribed which he did not take with regularity but without help.  The chiropractor ordered an MRI of the lumbar spine but neither the report or the films are available for review.\par \par He is on eyedrops for glaucoma and has had a prior left total knee replacement and a bilateral inguinal hernia repair. [Pain Location] : pain [Worsening] : worsening [9] : a maximum pain level of 9/10 [Bending] : worsened by bending [Lifting] : worsened by lifting [Prolonged Sitting] : not worsened by prolonged sitting [Prolonged Standing] : worsened by prolonged standing [Sitting] : not worsened by sitting [Standing] : worsened by standing [Walking] : worsened by walking

## 2021-10-08 NOTE — REASON FOR VISIT
[Initial Visit] : an initial visit for [Degenerative Joint Disease] : degenerative joint disease [Herniated Discs] : herniated discs [Radiculopathy] : radiculopathy

## 2021-10-08 NOTE — DISCUSSION/SUMMARY
[Medication Risks Reviewed] : Medication risks reviewed [de-identified] : I recommended rest and moist heat.  The symptoms are most likely secondary to herniated lumbar disc at L5-S1 or possibly L4-5.  He has been started on diclofenac 75 mg twice a day as a nonsteroidal anti-inflammatory and I will see him for follow-up in 3 weeks.  He will return with a copy of his MRI on a disc for my review.  He will call if there are problems with the medication or worsening of his symptoms.  He was planning on leaving for [place] for the winter where he has a place but he has decided against that in light of his current symptoms.

## 2021-10-08 NOTE — PHYSICAL EXAM
[de-identified] : He is fully alert and oriented with a normal mood and affect.  He is in no acute distress as a take the history.  He moves around the examining room with difficulty secondary to left leg pain.  There are no cutaneous abnormalities or palpable bony defects of the spine.  There is a flattening of the normal lumbar lordosis.  There is no evidence of shortness of breath or respiratory distress.  Forward flexion of the spine is limited to 30 degrees by lower back discomfort and left leg pain.  His lower extremity neurological examination revealed trace symmetrical knee jerks.  Ankle jerk is 1+ on the right and absent on the left.  Sensory exam is normal to light touch in all dermatomes.  Straight leg raising is markedly limited and limited to 60 degrees on the right with left buttock pain and to 40 degrees on the left.  His hips have a slightly decreased range of motion but with normal stability.  Vascular examination shows no evidence of varicosities and there is no lymphedema.  Cutaneous examination of the extremities reveals a well-healed incision from his left total knee replacement. [de-identified] : AP and lateral x-rays of the lumbar spine again reveal a straightening of the normal lumbar lordosis.  There are some anterior osteophytes at the 3 4 level and there is some mild disc space narrowing at L4-5 and L5-S1.  There are no destructive changes.\par \par Prior x-ray of the pelvis revealed some very mild disc space narrowing in the right hip.

## 2021-10-12 ENCOUNTER — APPOINTMENT (OUTPATIENT)
Dept: ORTHOPEDIC SURGERY | Facility: CLINIC | Age: 66
End: 2021-10-12
Payer: MEDICARE

## 2021-10-12 PROCEDURE — 99213 OFFICE O/P EST LOW 20 MIN: CPT

## 2021-10-12 NOTE — PHYSICAL EXAM
[de-identified] : He is comfortable sitting at this point.  Straight leg raising is negative to 90 degrees. [de-identified] : Reviewed the outside MRI of the lumbar spine.  It is an open study and of suboptimal quality.  There are no T2 axial images.  There is narrowing of the disc space with some endplate irregularities at L5-S1.  There is a central and left-sided distally extruded fragment at the L4-5 level.

## 2021-10-12 NOTE — DISCUSSION/SUMMARY
[Medication Risks Reviewed] : Medication risks reviewed [de-identified] : He will continue with the diclofenac 75 mg twice a day and I will see him for follow-up as initially planned.

## 2021-10-12 NOTE — HISTORY OF PRESENT ILLNESS
[de-identified] : He returns early with a copy of his MRI for my review.  He has been on the diclofenac for only 3 to 4 days and he has not seen any improvement yet.  There are no new symptoms.  He has not developed back pain and continues with symptoms only of a left-sided lumbar radiculopathy. [Pain Location] : pain [Stable] : stable

## 2021-10-31 ENCOUNTER — RX RENEWAL (OUTPATIENT)
Age: 66
End: 2021-10-31

## 2021-11-03 ENCOUNTER — APPOINTMENT (OUTPATIENT)
Dept: ORTHOPEDIC SURGERY | Facility: CLINIC | Age: 66
End: 2021-11-03
Payer: MEDICARE

## 2021-11-03 PROCEDURE — 99214 OFFICE O/P EST MOD 30 MIN: CPT

## 2021-11-03 NOTE — PHYSICAL EXAM
[de-identified] : He is comfortable sitting today.  He continues to have an absent left ankle jerk.  Motor power remains normal in all lower extremity groups and sensation is normal.  Straight leg raising is negative to 90 degrees in the sitting position.

## 2021-11-03 NOTE — HISTORY OF PRESENT ILLNESS
[de-identified] : He returns for follow-up of his symptoms of a left-sided lumbar radiculopathy likely secondary to a herniated lumbar disc at L5-S1 on the left.  He has not had problems tolerating the diclofenac.  The left buttock and leg pain that was graded as a 9 at the time of his last visit is currently a 6 or a 7.  There are no new symptoms. [Pain Location] : pain [6] : a minimum pain level of 6/10 [7] : a maximum pain level of 7/10

## 2021-11-03 NOTE — DISCUSSION/SUMMARY
[de-identified] : He will continue the diclofenac 75 mg twice a day and I will see him for follow-up in 3-1/2 weeks.  He has been given a prescription to obtain a liver function profile 1 week prior to his next visit.  We discussed that if he fails to make sequential progress the next step would be a course of oral prednisone.  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

## 2021-11-07 ENCOUNTER — RX RENEWAL (OUTPATIENT)
Age: 66
End: 2021-11-07

## 2021-11-16 ENCOUNTER — TRANSCRIPTION ENCOUNTER (OUTPATIENT)
Age: 66
End: 2021-11-16

## 2021-11-26 ENCOUNTER — APPOINTMENT (OUTPATIENT)
Dept: ORTHOPEDIC SURGERY | Facility: CLINIC | Age: 66
End: 2021-11-26
Payer: MEDICARE

## 2021-11-26 VITALS — WEIGHT: 200 LBS | HEIGHT: 72 IN | BODY MASS INDEX: 27.09 KG/M2

## 2021-11-26 DIAGNOSIS — Z96.652 PRESENCE OF LEFT ARTIFICIAL KNEE JOINT: ICD-10-CM

## 2021-11-26 PROCEDURE — 99213 OFFICE O/P EST LOW 20 MIN: CPT

## 2021-11-26 NOTE — ADDENDUM
[FreeTextEntry1] : I, Neeraj Mejias, acted solely as a scribe for Dr. Surya Garcia on this date 11/26/2021.\par All medical record entries made by the Scribe were at my, Dr. Surya Garcia, direction and personally dictated by me on 11/26/2021. I have reviewed the chart and agree that the record accurately reflects my personal performance of the history, physical exam, assessment and plan. I have also personally directed, reviewed, and agreed with the chart.

## 2021-11-26 NOTE — DISCUSSION/SUMMARY
[de-identified] : I went over the pathophysiology of the patient's symptoms in great detail with the patient. I advised him that clicking is expected with a knee replacement. We discussed the use of ice, Tylenol and anti-inflammatories to relieve pain. He needs to avoid high-impact activities such as running and jumping. I recommend alternative activities such as biking, swimming and weight lifting. He should focus on light weight and high repetition exercises. He will follow-up in 5 months when he returns from [place]. All of his questions were answered. He understands and consents to the plan.\par \par FU 5 months.

## 2021-11-26 NOTE — PHYSICAL EXAM
[de-identified] : Constitutional\par o Appearance : well-nourished, well developed, alert, in no acute distress \par Head and Face\par o Head :\par ¦ Inspection : atraumatic, normocephalic\par o Face :\par ¦ Inspection : no visible rash or discoloration\par Respiratory\par o Respiratory Effort: breathing unlabored \par Neurologic\par o Mental Status Examination :\par ¦ Orientation : grossly oriented to person, place and time\par Psychiatric\par o Mood and Affect: mood normal, affect appropriate \par \par Right Lower Extremity\par o Buttock : no tenderness, swelling or deformities \par o Right Hip :\par ¦ Inspection/Palpation : no tenderness, swelling or deformities\par ¦ Range of Motion : full and painless in all planes, no crepitance\par ¦ Stability : joint stability intact\par ¦ Strength : extension, flexion, adduction, abduction, internal rotation and external rotation 5/5 \par \par o Right Knee :\par ¦ Inspection/Palpation : no medial or lateral compartment tenderness, no swelling\par ¦ Range of Motion : FROM, tightness anteriorly with full forward flexion but no pain, no crepitance, good patellofemoral glide \par ¦ Stability : no valgus or varus instability present on provocative testing\par ¦ Strength : flexion and extension 5/5\par ¦ Tests and Signs : Anterior Drawer negative, Lachman negative, Tisha's negative\par \par Left Lower Extremity\par o Buttock : no tenderness, swelling or deformities \par o Left Hip :\par ¦ Inspection/Palpation : no tenderness, no swelling or deformities\par ¦ Range of Motion : full and painless in all planes, no crepitance\par ¦ Stability : joint stability intact\par ¦ Strength : extension, flexion, adduction, abduction, internal rotation and external rotation 5/5\par \par o Left Knee :\par ¦ Inspection/Palpation : no tenderness to palpation, no swelling, well-healed incision\par ¦ Range of Motion : 0-135° painless, no crepitance, good patellofemoral glide \par ¦ Stability : no valgus or varus instability present on provocative testing\par ¦ Strength : flexion and extension 5/5\par ¦ Tests and Signs : Anterior Drawer negative\par \par Gait: good alignment of the left knee with full extension, varus deformity of the right knee, no significant extremity swelling or lymphedema

## 2021-11-26 NOTE — HISTORY OF PRESENT ILLNESS
[de-identified] : 66 year old male presents with right knee pain. It started 12 weeks ago without injury. He states that he woke up with swelling and could not comfortably bend his knee, bear weight, or sleep. He denies buckling. He received a cortisone injection on 8/6/2021 and thought it helped. He received a Durolane injection on 10/07/2021 which also helped. He notes his right knee feels fine. He complains of left knee clicking. His left knee was replaced by Dr. Garcia about 2016.\par \par Radiology Results taken 8/6/2021:\par o Right Knee: Standing AP, lateral, tunnel and skyline views were obtained and reveal moderate medial compartment narrowing with mild to moderate patellofemoral arthritis.\par o Left Knee : Standing AP, Lateral and skyline views of the knee were obtained and revealed excellent position of his left total knee replacement with possibly slight subsidence but no loosening. Central tracking of the patella. \par \par MRI of the RIGHT knee obtained at [facility] on 07/13/21 revealed:\par 1. Moderate to severe patellofemoral and mild to moderate medial tibiofemoral compartment arthrosis.\par 2. Moderate knee joint effusion with synovitis. Loose intra-articular bodies within the posterior recess of the knee located posterior the posterior cruciate ligament.\par 3. Prominent intrasubstance degeneration within the posterior horn of the medial meniscus which does not definitively contact an articular surface to constitute a tear.

## 2021-11-29 ENCOUNTER — APPOINTMENT (OUTPATIENT)
Dept: ORTHOPEDIC SURGERY | Facility: CLINIC | Age: 66
End: 2021-11-29
Payer: MEDICARE

## 2021-11-29 DIAGNOSIS — M54.16 RADICULOPATHY, LUMBAR REGION: ICD-10-CM

## 2021-11-29 PROCEDURE — 99213 OFFICE O/P EST LOW 20 MIN: CPT

## 2021-11-29 NOTE — DISCUSSION/SUMMARY
[Medication Risks Reviewed] : Medication risks reviewed [de-identified] : He will stop the diclofenac as his transaminases are slightly elevated.  He has been switched to ibuprofen 800 mg 3 times a day.  On December 15 he will be leaving to spend the winter in [place].  I will see him for follow-up before he leaves.  He will call if there are problems with the medication.

## 2021-11-29 NOTE — HISTORY OF PRESENT ILLNESS
[de-identified] : The left buttock and leg pain secondary to the distally extruded fragment of his herniated disc at L4-5 which was initially graded as a 9 was a 6 or 7 when he was last seen 3 weeks ago.  Currently the symptoms are much improved and described as only a discomfort or pulling behind his knee.  He has not had problems tolerating the diclofenac.  His liver function profile shows that his transaminases are slightly elevated.

## 2021-12-15 ENCOUNTER — APPOINTMENT (OUTPATIENT)
Dept: ORTHOPEDIC SURGERY | Facility: CLINIC | Age: 66
End: 2021-12-15
Payer: MEDICARE

## 2021-12-15 VITALS
HEART RATE: 69 BPM | DIASTOLIC BLOOD PRESSURE: 83 MMHG | BODY MASS INDEX: 27.09 KG/M2 | HEIGHT: 72 IN | SYSTOLIC BLOOD PRESSURE: 183 MMHG | WEIGHT: 200 LBS

## 2021-12-15 DIAGNOSIS — M51.26 OTHER INTERVERTEBRAL DISC DISPLACEMENT, LUMBAR REGION: ICD-10-CM

## 2021-12-15 DIAGNOSIS — M47.816 SPONDYLOSIS W/OUT MYELOPATHY OR RADICULOPATHY, LUMBAR REGION: ICD-10-CM

## 2021-12-15 DIAGNOSIS — M54.16 RADICULOPATHY, LUMBAR REGION: ICD-10-CM

## 2021-12-15 PROCEDURE — 99213 OFFICE O/P EST LOW 20 MIN: CPT

## 2021-12-15 NOTE — HISTORY OF PRESENT ILLNESS
[de-identified] : He returns for follow-up of symptoms of his left-sided lumbar radiculopathy from his distally extruded herniated lumbar disc at L4-5.  At the time of his last visit he was switched from diclofenac to ibuprofen as he had a mild elevation of one of his transaminases.  He has not had problems tolerating the ibuprofen and feels he has made further improvement.  He has only some occasional pulling in the buttock and leg.  He will be leaving tonight for the winter in [place]. [Pain Location] : pain [0] : a minimum pain level of 0/10 [1] : a maximum pain level of 1/10 [Intermit.] : ~He/She~ states the symptoms seem to be intermittent

## 2021-12-15 NOTE — DISCUSSION/SUMMARY
[Medication Risks Reviewed] : Medication risks reviewed [de-identified] : He will finish the ibuprofen 800 mg 3 times a day.  He has about 2 weeks of medicine left.  He will then lower the dose to 600 mg 3 times a day for 2 weeks and if doing well 400 mg 3 times a day for 10 days or so before discontinuing anti-inflammatory medication altogether.  If necessary if his symptoms significantly increase he will need to return from [place].

## 2022-03-15 NOTE — ED ADULT TRIAGE NOTE - NS ED NURSE BANDS TYPE
Voice message left. Pt advised, per Dr Sosa, to reduce dose Metformin from 4 tablets (2000mg) with breakfast to 2 tablets (1000mg) with dinner. Glipizide prescription will remain the same. Rx for both sent to pharmacy.     Ana Gregorio, RN, BSN, CDCES   Certified Diabetes Care/  [facility], [facility])    Name band;

## 2022-05-22 ENCOUNTER — INPATIENT (INPATIENT)
Facility: HOSPITAL | Age: 67
LOS: 10 days | Discharge: HOME | DRG: 24 | End: 2022-06-02
Attending: PSYCHIATRY & NEUROLOGY | Admitting: PSYCHIATRY & NEUROLOGY
Payer: MEDICARE

## 2022-05-22 ENCOUNTER — EMERGENCY (EMERGENCY)
Facility: HOSPITAL | Age: 67
LOS: 1 days | Discharge: ANOTHER HOSPITAL | End: 2022-05-22
Attending: INTERNAL MEDICINE | Admitting: INTERNAL MEDICINE
Payer: MEDICARE

## 2022-05-22 VITALS
HEIGHT: 72 IN | OXYGEN SATURATION: 97 % | WEIGHT: 189.6 LBS | HEART RATE: 64 BPM | RESPIRATION RATE: 18 BRPM | TEMPERATURE: 98 F | SYSTOLIC BLOOD PRESSURE: 173 MMHG | DIASTOLIC BLOOD PRESSURE: 95 MMHG

## 2022-05-22 VITALS
WEIGHT: 199.96 LBS | HEART RATE: 58 BPM | OXYGEN SATURATION: 99 % | HEIGHT: 72 IN | RESPIRATION RATE: 18 BRPM | SYSTOLIC BLOOD PRESSURE: 193 MMHG | TEMPERATURE: 99 F | DIASTOLIC BLOOD PRESSURE: 101 MMHG

## 2022-05-22 VITALS
HEART RATE: 58 BPM | SYSTOLIC BLOOD PRESSURE: 185 MMHG | DIASTOLIC BLOOD PRESSURE: 79 MMHG | TEMPERATURE: 98 F | OXYGEN SATURATION: 100 % | RESPIRATION RATE: 12 BRPM

## 2022-05-22 DIAGNOSIS — Z98.89 OTHER SPECIFIED POSTPROCEDURAL STATES: Chronic | ICD-10-CM

## 2022-05-22 DIAGNOSIS — I24.9 ACUTE ISCHEMIC HEART DISEASE, UNSPECIFIED: ICD-10-CM

## 2022-05-22 DIAGNOSIS — Z96.652 PRESENCE OF LEFT ARTIFICIAL KNEE JOINT: Chronic | ICD-10-CM

## 2022-05-22 LAB
A1C WITH ESTIMATED AVERAGE GLUCOSE RESULT: 5.3 % — SIGNIFICANT CHANGE UP (ref 4–5.6)
ALBUMIN SERPL ELPH-MCNC: 3.4 G/DL — SIGNIFICANT CHANGE UP (ref 3.3–5)
ALBUMIN SERPL ELPH-MCNC: 4 G/DL — SIGNIFICANT CHANGE UP (ref 3.3–5)
ALP SERPL-CCNC: 88 U/L — SIGNIFICANT CHANGE UP (ref 40–120)
ALP SERPL-CCNC: 97 U/L — SIGNIFICANT CHANGE UP (ref 40–120)
ALT FLD-CCNC: 25 U/L — SIGNIFICANT CHANGE UP (ref 10–45)
ALT FLD-CCNC: 38 U/L — SIGNIFICANT CHANGE UP (ref 10–45)
ANION GAP SERPL CALC-SCNC: 12 MMOL/L — SIGNIFICANT CHANGE UP (ref 5–17)
ANION GAP SERPL CALC-SCNC: 6 MMOL/L — SIGNIFICANT CHANGE UP (ref 5–17)
APTT BLD: 30.5 SEC — SIGNIFICANT CHANGE UP (ref 27.5–35.5)
APTT BLD: 31.8 SEC — SIGNIFICANT CHANGE UP (ref 27.5–35.5)
AST SERPL-CCNC: 20 U/L — SIGNIFICANT CHANGE UP (ref 10–40)
AST SERPL-CCNC: 25 U/L — SIGNIFICANT CHANGE UP (ref 10–40)
BASOPHILS # BLD AUTO: 0.12 K/UL — SIGNIFICANT CHANGE UP (ref 0–0.2)
BASOPHILS NFR BLD AUTO: 1.8 % — SIGNIFICANT CHANGE UP (ref 0–2)
BILIRUB DIRECT SERPL-MCNC: 0.1 MG/DL — SIGNIFICANT CHANGE UP (ref 0–0.3)
BILIRUB INDIRECT FLD-MCNC: 0.3 MG/DL — SIGNIFICANT CHANGE UP (ref 0.2–1)
BILIRUB SERPL-MCNC: 0.4 MG/DL — SIGNIFICANT CHANGE UP (ref 0.2–1.2)
BILIRUB SERPL-MCNC: 0.5 MG/DL — SIGNIFICANT CHANGE UP (ref 0.2–1.2)
BUN SERPL-MCNC: 10 MG/DL — SIGNIFICANT CHANGE UP (ref 7–23)
BUN SERPL-MCNC: 14 MG/DL — SIGNIFICANT CHANGE UP (ref 7–23)
CALCIUM SERPL-MCNC: 8.8 MG/DL — SIGNIFICANT CHANGE UP (ref 8.4–10.5)
CALCIUM SERPL-MCNC: 8.9 MG/DL — SIGNIFICANT CHANGE UP (ref 8.4–10.5)
CHLORIDE SERPL-SCNC: 105 MMOL/L — SIGNIFICANT CHANGE UP (ref 96–108)
CHLORIDE SERPL-SCNC: 108 MMOL/L — SIGNIFICANT CHANGE UP (ref 96–108)
CO2 SERPL-SCNC: 23 MMOL/L — SIGNIFICANT CHANGE UP (ref 22–31)
CO2 SERPL-SCNC: 28 MMOL/L — SIGNIFICANT CHANGE UP (ref 22–31)
CREAT SERPL-MCNC: 0.9 MG/DL — SIGNIFICANT CHANGE UP (ref 0.5–1.3)
CREAT SERPL-MCNC: 1.04 MG/DL — SIGNIFICANT CHANGE UP (ref 0.5–1.3)
EGFR: 78 ML/MIN/1.73M2 — SIGNIFICANT CHANGE UP
EGFR: 92 ML/MIN/1.73M2 — SIGNIFICANT CHANGE UP
EOSINOPHIL # BLD AUTO: 0.17 K/UL — SIGNIFICANT CHANGE UP (ref 0–0.5)
EOSINOPHIL NFR BLD AUTO: 2.5 % — SIGNIFICANT CHANGE UP (ref 0–6)
ESTIMATED AVERAGE GLUCOSE: 105 MG/DL — SIGNIFICANT CHANGE UP (ref 68–114)
ETHANOL SERPL-MCNC: <3 MG/DL — SIGNIFICANT CHANGE UP (ref 0–3)
GLUCOSE SERPL-MCNC: 118 MG/DL — HIGH (ref 70–99)
GLUCOSE SERPL-MCNC: 97 MG/DL — SIGNIFICANT CHANGE UP (ref 70–99)
HCT VFR BLD CALC: 42.5 % — SIGNIFICANT CHANGE UP (ref 39–50)
HCT VFR BLD CALC: 42.7 % — SIGNIFICANT CHANGE UP (ref 39–50)
HGB BLD-MCNC: 14.4 G/DL — SIGNIFICANT CHANGE UP (ref 13–17)
HGB BLD-MCNC: 15.4 G/DL — SIGNIFICANT CHANGE UP (ref 13–17)
IMM GRANULOCYTES NFR BLD AUTO: 0.3 % — SIGNIFICANT CHANGE UP (ref 0–1.5)
INR BLD: 1.03 RATIO — SIGNIFICANT CHANGE UP (ref 0.88–1.16)
INR BLD: 1.03 RATIO — SIGNIFICANT CHANGE UP (ref 0.88–1.16)
LYMPHOCYTES # BLD AUTO: 1.62 K/UL — SIGNIFICANT CHANGE UP (ref 1–3.3)
LYMPHOCYTES # BLD AUTO: 24.1 % — SIGNIFICANT CHANGE UP (ref 13–44)
MAGNESIUM SERPL-MCNC: 2.2 MG/DL — SIGNIFICANT CHANGE UP (ref 1.6–2.6)
MCHC RBC-ENTMCNC: 30.6 PG — SIGNIFICANT CHANGE UP (ref 27–34)
MCHC RBC-ENTMCNC: 32.6 PG — SIGNIFICANT CHANGE UP (ref 27–34)
MCHC RBC-ENTMCNC: 33.9 GM/DL — SIGNIFICANT CHANGE UP (ref 32–36)
MCHC RBC-ENTMCNC: 36.1 GM/DL — HIGH (ref 32–36)
MCV RBC AUTO: 90.3 FL — SIGNIFICANT CHANGE UP (ref 80–100)
MCV RBC AUTO: 90.4 FL — SIGNIFICANT CHANGE UP (ref 80–100)
MONOCYTES # BLD AUTO: 0.49 K/UL — SIGNIFICANT CHANGE UP (ref 0–0.9)
MONOCYTES NFR BLD AUTO: 7.3 % — SIGNIFICANT CHANGE UP (ref 2–14)
NEUTROPHILS # BLD AUTO: 4.29 K/UL — SIGNIFICANT CHANGE UP (ref 1.8–7.4)
NEUTROPHILS NFR BLD AUTO: 64 % — SIGNIFICANT CHANGE UP (ref 43–77)
NRBC # BLD: 0 /100 WBCS — SIGNIFICANT CHANGE UP (ref 0–0)
NRBC # BLD: 0 /100 WBCS — SIGNIFICANT CHANGE UP (ref 0–0)
PHOSPHATE SERPL-MCNC: 2.6 MG/DL — SIGNIFICANT CHANGE UP (ref 2.5–4.5)
PLATELET # BLD AUTO: 181 K/UL — SIGNIFICANT CHANGE UP (ref 150–400)
PLATELET # BLD AUTO: 223 K/UL — SIGNIFICANT CHANGE UP (ref 150–400)
POTASSIUM SERPL-MCNC: 3.6 MMOL/L — SIGNIFICANT CHANGE UP (ref 3.5–5.3)
POTASSIUM SERPL-MCNC: 3.7 MMOL/L — SIGNIFICANT CHANGE UP (ref 3.5–5.3)
POTASSIUM SERPL-SCNC: 3.6 MMOL/L — SIGNIFICANT CHANGE UP (ref 3.5–5.3)
POTASSIUM SERPL-SCNC: 3.7 MMOL/L — SIGNIFICANT CHANGE UP (ref 3.5–5.3)
PROT SERPL-MCNC: 6.4 G/DL — SIGNIFICANT CHANGE UP (ref 6–8.3)
PROT SERPL-MCNC: 6.9 G/DL — SIGNIFICANT CHANGE UP (ref 6–8.3)
PROTHROM AB SERPL-ACNC: 11.9 SEC — SIGNIFICANT CHANGE UP (ref 10.5–13.4)
PROTHROM AB SERPL-ACNC: 11.9 SEC — SIGNIFICANT CHANGE UP (ref 10.5–13.4)
RBC # BLD: 4.7 M/UL — SIGNIFICANT CHANGE UP (ref 4.2–5.8)
RBC # BLD: 4.73 M/UL — SIGNIFICANT CHANGE UP (ref 4.2–5.8)
RBC # FLD: 13.2 % — SIGNIFICANT CHANGE UP (ref 10.3–14.5)
RBC # FLD: 13.2 % — SIGNIFICANT CHANGE UP (ref 10.3–14.5)
SARS-COV-2 RNA SPEC QL NAA+PROBE: SIGNIFICANT CHANGE UP
SODIUM SERPL-SCNC: 140 MMOL/L — SIGNIFICANT CHANGE UP (ref 135–145)
SODIUM SERPL-SCNC: 142 MMOL/L — SIGNIFICANT CHANGE UP (ref 135–145)
TROPONIN I, HIGH SENSITIVITY RESULT: 13.7 NG/L — SIGNIFICANT CHANGE UP
WBC # BLD: 5.76 K/UL — SIGNIFICANT CHANGE UP (ref 3.8–10.5)
WBC # BLD: 6.71 K/UL — SIGNIFICANT CHANGE UP (ref 3.8–10.5)
WBC # FLD AUTO: 5.76 K/UL — SIGNIFICANT CHANGE UP (ref 3.8–10.5)
WBC # FLD AUTO: 6.71 K/UL — SIGNIFICANT CHANGE UP (ref 3.8–10.5)

## 2022-05-22 PROCEDURE — 0042T: CPT

## 2022-05-22 PROCEDURE — 87635 SARS-COV-2 COVID-19 AMP PRB: CPT

## 2022-05-22 PROCEDURE — 36415 COLL VENOUS BLD VENIPUNCTURE: CPT

## 2022-05-22 PROCEDURE — 99285 EMERGENCY DEPT VISIT HI MDM: CPT

## 2022-05-22 PROCEDURE — 93005 ELECTROCARDIOGRAM TRACING: CPT

## 2022-05-22 PROCEDURE — 80053 COMPREHEN METABOLIC PANEL: CPT

## 2022-05-22 PROCEDURE — 93010 ELECTROCARDIOGRAM REPORT: CPT

## 2022-05-22 PROCEDURE — 82962 GLUCOSE BLOOD TEST: CPT

## 2022-05-22 PROCEDURE — 99285 EMERGENCY DEPT VISIT HI MDM: CPT | Mod: 25

## 2022-05-22 PROCEDURE — 70498 CT ANGIOGRAPHY NECK: CPT | Mod: MA

## 2022-05-22 PROCEDURE — 93010 ELECTROCARDIOGRAM REPORT: CPT | Mod: 77

## 2022-05-22 PROCEDURE — 70496 CT ANGIOGRAPHY HEAD: CPT | Mod: MA

## 2022-05-22 PROCEDURE — 70450 CT HEAD/BRAIN W/O DYE: CPT | Mod: MA

## 2022-05-22 PROCEDURE — 99223 1ST HOSP IP/OBS HIGH 75: CPT

## 2022-05-22 PROCEDURE — 70450 CT HEAD/BRAIN W/O DYE: CPT | Mod: 26,MA,59

## 2022-05-22 PROCEDURE — 85730 THROMBOPLASTIN TIME PARTIAL: CPT

## 2022-05-22 PROCEDURE — 70496 CT ANGIOGRAPHY HEAD: CPT | Mod: 26,MA

## 2022-05-22 PROCEDURE — 80307 DRUG TEST PRSMV CHEM ANLYZR: CPT

## 2022-05-22 PROCEDURE — 85610 PROTHROMBIN TIME: CPT

## 2022-05-22 PROCEDURE — 85025 COMPLETE CBC W/AUTO DIFF WBC: CPT

## 2022-05-22 PROCEDURE — 70498 CT ANGIOGRAPHY NECK: CPT | Mod: 26,MA

## 2022-05-22 PROCEDURE — 84484 ASSAY OF TROPONIN QUANT: CPT

## 2022-05-22 RX ORDER — LATANOPROST 0.05 MG/ML
1 SOLUTION/ DROPS OPHTHALMIC; TOPICAL AT BEDTIME
Refills: 0 | Status: DISCONTINUED | OUTPATIENT
Start: 2022-05-22 | End: 2022-06-02

## 2022-05-22 RX ORDER — CLOPIDOGREL BISULFATE 75 MG/1
600 TABLET, FILM COATED ORAL ONCE
Refills: 0 | Status: DISCONTINUED | OUTPATIENT
Start: 2022-05-22 | End: 2022-05-25

## 2022-05-22 RX ORDER — ASPIRIN/CALCIUM CARB/MAGNESIUM 324 MG
324 TABLET ORAL ONCE
Refills: 0 | Status: COMPLETED | OUTPATIENT
Start: 2022-05-22 | End: 2022-05-22

## 2022-05-22 RX ORDER — ASPIRIN/CALCIUM CARB/MAGNESIUM 324 MG
81 TABLET ORAL DAILY
Refills: 0 | Status: ACTIVE | OUTPATIENT
Start: 2022-05-22 | End: 2023-04-20

## 2022-05-22 RX ORDER — ATORVASTATIN CALCIUM 80 MG/1
80 TABLET, FILM COATED ORAL AT BEDTIME
Refills: 0 | Status: DISCONTINUED | OUTPATIENT
Start: 2022-05-22 | End: 2022-06-02

## 2022-05-22 RX ORDER — THIAMINE MONONITRATE (VIT B1) 100 MG
100 TABLET ORAL DAILY
Refills: 0 | Status: COMPLETED | OUTPATIENT
Start: 2022-05-22 | End: 2022-05-25

## 2022-05-22 RX ORDER — FOLIC ACID 0.8 MG
1 TABLET ORAL DAILY
Refills: 0 | Status: DISCONTINUED | OUTPATIENT
Start: 2022-05-22 | End: 2022-06-02

## 2022-05-22 RX ORDER — SODIUM CHLORIDE 9 MG/ML
1000 INJECTION INTRAMUSCULAR; INTRAVENOUS; SUBCUTANEOUS
Refills: 0 | Status: COMPLETED | OUTPATIENT
Start: 2022-05-22 | End: 2022-05-22

## 2022-05-22 RX ORDER — ENOXAPARIN SODIUM 100 MG/ML
40 INJECTION SUBCUTANEOUS EVERY 24 HOURS
Refills: 0 | Status: DISCONTINUED | OUTPATIENT
Start: 2022-05-22 | End: 2022-05-26

## 2022-05-22 RX ADMIN — ENOXAPARIN SODIUM 40 MILLIGRAM(S): 100 INJECTION SUBCUTANEOUS at 21:27

## 2022-05-22 RX ADMIN — Medication 100 MILLIGRAM(S): at 18:09

## 2022-05-22 RX ADMIN — Medication 1 MILLIGRAM(S): at 18:09

## 2022-05-22 RX ADMIN — ATORVASTATIN CALCIUM 80 MILLIGRAM(S): 80 TABLET, FILM COATED ORAL at 21:28

## 2022-05-22 RX ADMIN — Medication 324 MILLIGRAM(S): at 10:35

## 2022-05-22 RX ADMIN — Medication 1 TABLET(S): at 18:09

## 2022-05-22 RX ADMIN — SODIUM CHLORIDE 1000 MILLILITER(S): 9 INJECTION INTRAMUSCULAR; INTRAVENOUS; SUBCUTANEOUS at 12:11

## 2022-05-22 RX ADMIN — LATANOPROST 1 DROP(S): 0.05 SOLUTION/ DROPS OPHTHALMIC; TOPICAL at 22:19

## 2022-05-22 NOTE — H&P ADULT - HISTORY OF PRESENT ILLNESS
67 year old left-handed male with medical history of macular degeneration (no vision loss) and EtOH use (daily 6-7 beers/day, last drink 2 days ago) who presents as a transfer from [facility] for tertiary stroke treatment.  Family at bedside to corroborate story.  Patient flew back from [place] Saturday (5/21) morning and took a cab home.  He said he "didn't feel good".  Later in the day, he was speaking one word sentences.  This morning when he woke up, family noted that he could not complete a full sentence.  Initial CTH 5/22/2022 with subacute left MCA territory infarct in the frontal lobe.  CTA with severe focal narrowing of left supraclinoid ICA extending to the carotid terminus, as well as the diminished opacification of the left anterior cerebral artery.  CTP with large penumbra in left MCA territory with mismatch volume of 12.7cc.  Patient transferred to [facility].  Stroke score of 1 for mild non-fluent aphasia.    NIHSS 1  preMRS 0

## 2022-05-22 NOTE — ED ADULT NURSE REASSESSMENT NOTE - NS ED NURSE REASSESS COMMENT FT1
CT perfusion results reviewed with Dr. Guerin. Plavix administration pending repeat consultation with receiving facility.

## 2022-05-22 NOTE — CONSULT NOTE ADULT - ASSESSMENT
67M adm stroke neuro. Starting having some difficulty speaking yesterday, worse this AM. Speaks in short sentences and has wfd. CTH showing L MCA infarct in frontal lobe. CTA/CTP showing L MCA territory penumbra (mismatch volume 12.7); dec contrast of L ICA w/ severe narrowing of L supraclinoid ICA ext to carotid term; dec contrast of L YVAN and dec caliber/filling of L MCA branches. Exam: some wfd otherwsise AOx3, FC, PERRL, EOMI, no facial, 5/5 throughout, no drift.  -Adm stroke neuro  -MRI w/o, MRA NOVA  -Remaining care per stroke neuro

## 2022-05-22 NOTE — ED ADULT TRIAGE NOTE - CHIEF COMPLAINT QUOTE
Patient presents BIB roommate from home with AMS. Patient last known well unknown, roommate states he came home from [place] yesterday morning at 2am not feeling well, slept most of the day yesterday, and then woke up this morning confused. Patient having difficulty finding words or providing information. Patient ambulating normally, strength in all extremities, no facial droop, speech is clear but does not make sense. Roommate states that patient "is a drinker" does not state how much.

## 2022-05-22 NOTE — ED ADULT NURSE NOTE - ISAR MEMORY
No

## 2022-05-22 NOTE — ED PROVIDER NOTE - OBJECTIVE STATEMENT
67 y m  unknown last well jaswant well time returned from [facility] [place] more than 28 h ago on a cab home her friend found her today not acting right lost of speech fluency

## 2022-05-22 NOTE — PATIENT PROFILE ADULT - DO YOU LACK THE NECESSARY SUPPORT TO HELP YOU COPE WITH LIFE CHALLENGES?
03/26/22 0954   Patient Assessment/Suction   Level of Consciousness (AVPU) alert   Respiratory Effort Normal;Unlabored   Expansion/Accessory Muscles/Retractions no use of accessory muscles;no retractions;expansion symmetric   All Lung Fields Breath Sounds clear;diminished   Rhythm/Pattern, Respiratory unlabored;pattern regular;depth regular;chest wiggle adequate;no shortness of breath reported   Cough Frequency no cough   PRE-TX-O2   O2 Device (Oxygen Therapy) nasal cannula   Flow (L/min) 2   SpO2 100 %   Pulse 62   Resp (!) 25   Inhaler   $ Inhaler Charges MDI (Metered Dose Inahler) Treatment;Given With Spacer   Daily Review of Necessity (Inhaler) completed   Respiratory Treatment Status (Inhaler) given   Treatment Route (Inhaler) mouthpiece;spacer/holding chamber   Patient Position (Inhaler) HOB elevated   Post Treatment Assessment (Inhaler) increased aeration   Signs of Intolerance (Inhaler) none   Breath Sounds Post-Respiratory Treatment   Throughout All Fields Post-Treatment All Fields   Throughout All Fields Post-Treatment aeration increased   Post-treatment Heart Rate (beats/min) 62   Post-treatment Resp Rate (breaths/min) 19   Education   $ Education Bronchodilator;15 min   Respiratory Evaluation   $ Care Plan Tech Time 15 min     
   03/27/22 0700   Patient Assessment/Suction   Level of Consciousness (AVPU) alert   Respiratory Effort Normal;Unlabored   Expansion/Accessory Muscles/Retractions no use of accessory muscles;no retractions;expansion symmetric   All Lung Fields Breath Sounds clear   Rhythm/Pattern, Respiratory unlabored;pattern regular;depth regular;chest wiggle adequate;no shortness of breath reported   Cough Frequency no cough   PRE-TX-O2   O2 Device (Oxygen Therapy) nasal cannula   Flow (L/min) 2   SpO2 98 %   Pulse Oximetry Type Intermittent   $ Pulse Oximetry - Multiple Charge Pulse Oximetry - Multiple   Pulse 65   Resp 16   Inhaler   $ Inhaler Charges PRN treatment not required   Education   $ Education Bronchodilator;15 min   Respiratory Evaluation   $ Care Plan Tech Time 15 min     
no

## 2022-05-22 NOTE — ED PROVIDER NOTE - CLINICAL SUMMARY MEDICAL DECISION MAKING FREE TEXT BOX
67 y m  unknown last well knon well time returned from [facility] more than 28 h ago on a cab home her friend found her today not acting right lost of speech fluency  ct brain L MCA infarct , ct shows multiple clots   pt accepted transfer   dr bowman neuro was called earlier

## 2022-05-22 NOTE — PATIENT PROFILE ADULT - FALL HARM RISK - HARM RISK INTERVENTIONS
Assistance with ambulation/Assistance OOB with selected safe patient handling equipment/Communicate Risk of Fall with Harm to all staff/Discuss with provider need for PT consult/Monitor for mental status changes/Monitor gait and stability/Provide patient with walking aids - walker, cane, crutches/Reinforce activity limits and safety measures with patient and family/Tailored Fall Risk Interventions/Toileting schedule using arm’s reach rule for commode and bathroom/Use of alarms - bed, chair and/or voice tab/Visual Cue: Yellow wristband and red socks/Bed in lowest position, wheels locked, appropriate side rails in place/Call bell, personal items and telephone in reach/Instruct patient to call for assistance before getting out of bed or chair/Non-slip footwear when patient is out of bed/[place]otati to call system/Physically safe environment - no spills, clutter or unnecessary equipment/Purposeful Proactive Rounding/Room/bathroom lighting operational, light cord in reach

## 2022-05-22 NOTE — CHART NOTE - NSCHARTNOTEFT_GEN_A_CORE
Obtain screening lower extremity venous ultrasound in patients who meet 1 or more of the following criteria as patient is high risk for DVT/PE on admission:   [] History of DVT/PE  []Hypercoagulable states (Factor V Leiden, Cancer, OCP, etc. )  [x]Prolonged immobility (hemiplegia/hemiparesis/post operative or any other extended immobilization)- flight   [] Transferred from outside facility (Rehab or Long term care)  [] Age </= to 50

## 2022-05-22 NOTE — H&P ADULT - NSHPPHYSICALEXAM_GEN_ALL_CORE
HEENT:  normocephalic, atraumatic  Lungs:  no increased work of respiration  Heart:  no obvious signs of respiratory distress  Extremities: no cyanosis or edema    Mental Status:  Awake, alert, eyes open spontaneously.  Oriented to person, place, time, situation.  Able to follow simple and cross commands  Language:  Non-fluent.  Intact to naming and repetition.  Speech:  clear without dysarthria    CN:  EOMI with mild difficulty with far right vision.  Visual fields full to confrontation.  No facial asymmetry  Motor:  5/5 x 4 extremities  Sensory:  grossly intact  Coordination:  no ataxia

## 2022-05-22 NOTE — ED ADULT NURSE NOTE - OBJECTIVE STATEMENT
Arrived with friend, report fogginess and difficulty finding words, speech is clear not garbled. Oriented to month and year, thought it was Saturday. SARAVIA with purpose and equal strength.

## 2022-05-22 NOTE — CONSULT NOTE ADULT - SUBJECTIVE AND OBJECTIVE BOX
p ([id number])    67M adm stroke neuro. Starting having some difficulty speaking yesterday, worse this AM. Speaks in short sentences and has wfd. CTH showing L MCA infarct in frontal lobe. CTA/CTP showing L MCA territory penumbra (mismatch volume 12.7); dec contrast of L ICA w/ severe narrowing of L supraclinoid ICA ext to carotid term; dec contrast of L YVAN and dec caliber/filling of L MCA branches. Exam: some wfd otherwsise AOx3, FC, PERRL, EOMI, no facial, 5/5 throughout, no drift.    --Anticoagulation:    =====================  PAST MEDICAL HISTORY   Glaucoma    Osteoarthritis    Knee pain, left    Elevated liver enzymes      PAST SURGICAL HISTORY   S/P knee surgery    S/P inguinal hernia repair    Status post total left knee replacement    S/P colonoscopy with polypectomy          MEDICATIONS:  Antibiotics:    Neuro:    Other:      SOCIAL HISTORY:   Occupation:   Marital Status:     FAMILY HISTORY:  Family history of brain cancer (Mother)    Family history of heart disease (Father)        ROS: Negative except per HPI    LABS:  PT/INR - ( 22 May 2022 10:20 )   PT: 11.9 sec;   INR: 1.03 ratio         PTT - ( 22 May 2022 10:20 )  PTT:31.8 sec                        15.4   6.71  )-----------( 223      ( 22 May 2022 10:20 )             42.7     05-22    142  |  108  |  14  ----------------------------<  118<H>  3.6   |  28  |  1.04    Ca    8.9      22 May 2022 10:20    TPro  6.9  /  Alb  3.4  /  TBili  0.5  /  DBili  x   /  AST  25  /  ALT  38  /  AlkPhos  97  05-22

## 2022-05-22 NOTE — CONSULT NOTE ADULT - ATTENDING COMMENTS
Acute L frontal operculum infarct with isolated aphasia. CT perfusion shows penumbra but no LVO. Severe stenosis of L supraclinoid ICA and possibly proximal M1. Needs angio to further assess.

## 2022-05-22 NOTE — H&P ADULT - TIME BILLING
67-year-old left-handed gentleman first evaluated at [facility] on 5/23/2022 with aphasia.  History and exam as above.  ROS otherwise negative.  CT head (5/22/2022) to my eye showed a subacute, moderate sized left hemispheric border zone infarct involving the centrum semiovale.  CTA neck (5/22/2022) to my eye showed moderate bilateral carotid stenosis, officially measured at about 50%.  CTA head (5/22/2022) to my eye showed severe left supraclinoid ICA and M1 stenosis.  CTP (5/22/2022).  CBF <30% = 9 cc in the left centrum semiovale, border zone distribution; T-max >6 seconds = 130 cc, left MCA and a small left YVAN component.  MRI brain (5/23/2022) to my eye showed an acute, moderate sized left hemispheric border zone infarct in the centrum semiovale.  MR RAFAEL (5/23/2022).  ICA-right 191; left 62; MCA-right 117; left 36.    Impression.  Mild-moderate or moderate transcortical motor aphasia, due to left hemispheric border zone infarction, with left supraclinoid ICA and M1 stenosis.  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  For now, will treat for symptomatic intracranial atherosclerosis and reassess if further investigation suggests moyamoya.  Plan.  TTE; ILR (per stroke AF trial); conventional cerebral angiogram; aspirin 81 mg daily; Plavix 75 mg daily for 3 months; atorvastatin 80 mg daily, target LDL less than 70; alcohol abuse counseling provided and he will most likely require more formal follow-up for this; PT/OT/speech therapy.

## 2022-05-22 NOTE — H&P ADULT - ASSESSMENT
67 year old left-handed male with medical history of macular degeneration (no vision loss) and EtOH use (daily 6-7 beers/day, last drink 2 days ago) who presents as a transfer from [facility] for tertiary stroke treatment.  Family at bedside to corroborate story.  Patient flew back from [place] Saturday (5/21) morning and took a cab home.  He said he "didn't feel good".  Later in the day, he was speaking one word sentences.  This morning when he woke up, family noted that he could not complete a full sentence.  Initial CTH 5/22/2022 with subacute left MCA territory infarct in the frontal lobe.  CTA with severe focal narrowing of left supraclinoid ICA extending to the carotid terminus, as well as the diminished opacification of the left anterior cerebral artery.  CTP with large penumbra in left MCA territory with mismatch volume of 12.7cc.  Patient transferred to [facility].  Stroke score of 1 for mild non-fluent aphasia.    NIHSS 1  preMRS 0    Impression:  Expressive aphasia secondary to left hemispheric dysfunction.  Ischemic stroke in the setting of left supraclinoid ICA severe stenosis most likely.  Less likely contributed by recent travel on airplane    -  Admit to stroke unit  -  Neurosurgery onboard  -  Aspirin 81mg PO daily  -  DVT ppx  -  Telemetry  -  TTE  -  MRI with/without contrast, MRA head/neck, NOVA (per neurosurgery request)  -  Lipid panel and HbA1c  -  High intensity statin for LDL goal < 70  -  HbA1c goal < 70  -  CIWA and recommendation of alcohol cessation  -  PT/OT/SLP eval  -  Stroke education  -  Bilateral lower extremity venous dopplers    To be staffed in AM by stroke team    Laurence Driscoll MD  [id number]  Vascular Neurology Fellow

## 2022-05-23 ENCOUNTER — APPOINTMENT (OUTPATIENT)
Dept: NEUROSURGERY | Facility: HOSPITAL | Age: 67
End: 2022-05-23

## 2022-05-23 LAB
ANION GAP SERPL CALC-SCNC: 14 MMOL/L — SIGNIFICANT CHANGE UP (ref 5–17)
BUN SERPL-MCNC: 11 MG/DL — SIGNIFICANT CHANGE UP (ref 7–23)
CALCIUM SERPL-MCNC: 9.4 MG/DL — SIGNIFICANT CHANGE UP (ref 8.4–10.5)
CHLORIDE SERPL-SCNC: 106 MMOL/L — SIGNIFICANT CHANGE UP (ref 96–108)
CHOLEST SERPL-MCNC: 229 MG/DL — HIGH
CO2 SERPL-SCNC: 22 MMOL/L — SIGNIFICANT CHANGE UP (ref 22–31)
CREAT SERPL-MCNC: 1 MG/DL — SIGNIFICANT CHANGE UP (ref 0.5–1.3)
EGFR: 81 ML/MIN/1.73M2 — SIGNIFICANT CHANGE UP
GLUCOSE SERPL-MCNC: 99 MG/DL — SIGNIFICANT CHANGE UP (ref 70–99)
HCT VFR BLD CALC: 41.8 % — SIGNIFICANT CHANGE UP (ref 39–50)
HCV AB S/CO SERPL IA: 0.1 S/CO — SIGNIFICANT CHANGE UP (ref 0–0.99)
HCV AB SERPL-IMP: SIGNIFICANT CHANGE UP
HDLC SERPL-MCNC: 48 MG/DL — SIGNIFICANT CHANGE UP
HGB BLD-MCNC: 14.8 G/DL — SIGNIFICANT CHANGE UP (ref 13–17)
LIPID PNL WITH DIRECT LDL SERPL: 140 MG/DL — HIGH
MAGNESIUM SERPL-MCNC: 2.1 MG/DL — SIGNIFICANT CHANGE UP (ref 1.6–2.6)
MCHC RBC-ENTMCNC: 31.8 PG — SIGNIFICANT CHANGE UP (ref 27–34)
MCHC RBC-ENTMCNC: 35.4 GM/DL — SIGNIFICANT CHANGE UP (ref 32–36)
MCV RBC AUTO: 89.7 FL — SIGNIFICANT CHANGE UP (ref 80–100)
NON HDL CHOLESTEROL: 181 MG/DL — HIGH
NRBC # BLD: 0 /100 WBCS — SIGNIFICANT CHANGE UP (ref 0–0)
PHOSPHATE SERPL-MCNC: 2.8 MG/DL — SIGNIFICANT CHANGE UP (ref 2.5–4.5)
PLATELET # BLD AUTO: 212 K/UL — SIGNIFICANT CHANGE UP (ref 150–400)
POTASSIUM SERPL-MCNC: 3.5 MMOL/L — SIGNIFICANT CHANGE UP (ref 3.5–5.3)
POTASSIUM SERPL-SCNC: 3.5 MMOL/L — SIGNIFICANT CHANGE UP (ref 3.5–5.3)
RBC # BLD: 4.66 M/UL — SIGNIFICANT CHANGE UP (ref 4.2–5.8)
RBC # FLD: 13.2 % — SIGNIFICANT CHANGE UP (ref 10.3–14.5)
SODIUM SERPL-SCNC: 142 MMOL/L — SIGNIFICANT CHANGE UP (ref 135–145)
TRIGL SERPL-MCNC: 205 MG/DL — HIGH
WBC # BLD: 6.39 K/UL — SIGNIFICANT CHANGE UP (ref 3.8–10.5)
WBC # FLD AUTO: 6.39 K/UL — SIGNIFICANT CHANGE UP (ref 3.8–10.5)

## 2022-05-23 PROCEDURE — 99223 1ST HOSP IP/OBS HIGH 75: CPT

## 2022-05-23 PROCEDURE — 93306 TTE W/DOPPLER COMPLETE: CPT | Mod: 26

## 2022-05-23 PROCEDURE — 70544 MR ANGIOGRAPHY HEAD W/O DYE: CPT | Mod: 26,59

## 2022-05-23 PROCEDURE — 36227 PLACE CATH XTRNL CAROTID: CPT

## 2022-05-23 PROCEDURE — 36224 PLACE CATH CAROTD ART: CPT | Mod: 50

## 2022-05-23 PROCEDURE — 70551 MRI BRAIN STEM W/O DYE: CPT | Mod: 26

## 2022-05-23 PROCEDURE — 70547 MR ANGIOGRAPHY NECK W/O DYE: CPT | Mod: 26

## 2022-05-23 PROCEDURE — 99222 1ST HOSP IP/OBS MODERATE 55: CPT | Mod: GC

## 2022-05-23 PROCEDURE — 36226 PLACE CATH VERTEBRAL ART: CPT | Mod: LT

## 2022-05-23 RX ORDER — CLOPIDOGREL BISULFATE 75 MG/1
75 TABLET, FILM COATED ORAL DAILY
Refills: 0 | Status: DISCONTINUED | OUTPATIENT
Start: 2022-05-23 | End: 2022-05-26

## 2022-05-23 RX ADMIN — CLOPIDOGREL BISULFATE 75 MILLIGRAM(S): 75 TABLET, FILM COATED ORAL at 12:45

## 2022-05-23 RX ADMIN — ATORVASTATIN CALCIUM 80 MILLIGRAM(S): 80 TABLET, FILM COATED ORAL at 21:50

## 2022-05-23 RX ADMIN — Medication 81 MILLIGRAM(S): at 12:46

## 2022-05-23 RX ADMIN — ENOXAPARIN SODIUM 40 MILLIGRAM(S): 100 INJECTION SUBCUTANEOUS at 21:51

## 2022-05-23 RX ADMIN — Medication 100 MILLIGRAM(S): at 12:45

## 2022-05-23 RX ADMIN — LATANOPROST 1 DROP(S): 0.05 SOLUTION/ DROPS OPHTHALMIC; TOPICAL at 21:50

## 2022-05-23 RX ADMIN — Medication 1 TABLET(S): at 12:43

## 2022-05-23 RX ADMIN — Medication 1 MILLIGRAM(S): at 12:46

## 2022-05-23 NOTE — PHYSICAL THERAPY INITIAL EVALUATION ADULT - PERTINENT HX OF CURRENT PROBLEM, REHAB EVAL
66 yo left-handed M w/medical history of macular degeneration (no vision loss) and EtOH use (daily 6-7 beers/day, last drink 2 days ago) who presents as a transfer from [facility] for tertiary stroke treatment.  CTP with large penumbra in left MCA territory with mismatch volume of 12.7cc. Expressive aphasia secondary to left hemispheric dysfunction.  Ischemic stroke in the setting of left supraclinoid ICA severe stenosis most likely.  Less likely contributed by recent travel on airplane.

## 2022-05-23 NOTE — CONSULT NOTE ADULT - SUBJECTIVE AND OBJECTIVE BOX
CAROLINE ROBERTO is a 67 year old left-handed male with medical history of macular degeneration (no vision loss) and EtOH use (daily 6-7 beers/day, last drink 2 days ago) who presents as a transfer from [facility] for tertiary stroke treatment.  Family at bedside to corroborate story.  Patient flew back from [place] Saturday (5/21) morning and took a cab home.  He said he "didn't feel good".  Later in the day, he was speaking one word sentences.  This morning when he woke up, family noted that he could not complete a full sentence.  Initial CTH 5/22/2022 with subacute left MCA territory infarct in the frontal lobe.  CTA with severe focal narrowing of left supraclinoid ICA extending to the carotid terminus, as well as the diminished opacification of the left anterior cerebral artery.  CTP with large penumbra in left MCA territory with mismatch volume of 12.7cc.  Patient transferred to [facility].  Stroke score of 1 for mild non-fluent aphasia.    NIHSS 1  preMRS 0 (22 May 2022 17:23)          REVIEW OF SYSTEMS  Constitutional - No fever, +fatigue  HEENT - No visual disturbances, No vertigo, No neck pain  Respiratory - No cough, No wheezing, No shortness of breath  Cardiovascular - No chest pain, No palpitations  Gastrointestinal - No abdominal pain, No nausea, No vomiting, No diarrhea, No constipation  Genitourinary - No dysuria, No incontinence  Neurological - No headaches, No memory loss, +loss of strength, No numbness  Skin - No itching, No rashes, No lesions   Musculoskeletal - No joint pain, No joint swelling, No muscle pain  Psychiatric - No depression, No anxiety    VITALS  T(C): 36.4 (05-23-22 @ 08:05), Max: 37.1 (05-22-22 @ 15:40)  HR: 58 (05-23-22 @ 08:05) (53 - 63)  BP: 191/80 (05-23-22 @ 08:05) (129/77 - 193/101)  RR: 13 (05-23-22 @ 08:05) (12 - 18)  SpO2: 98% (05-23-22 @ 08:05) (96% - 100%)  Wt(kg): --    PAST MEDICAL & SURGICAL HISTORY  Glaucoma    Osteoarthritis    Knee pain, left    Elevated liver enzymes    S/P knee surgery    S/P inguinal hernia repair    Status post total left knee replacement    S/P colonoscopy with polypectomy        FUNCTIONAL HISTORY  Lives   Independent in ADLs and ambulation PTA    CURRENT FUNCTIONAL STATUS  PT/OT pending      SOCIAL HISTORY - as per documentation/history  Smoking - None  EtOH - daily EtOH use 6-7 beers/day  Drugs - None    FAMILY HISTORY   Family history of brain cancer (Mother)    Family history of heart disease (Father)        RECENT LABS - Reviewed  CBC Full  -  ( 23 May 2022 05:49 )  WBC Count : 6.39 K/uL  RBC Count : 4.66 M/uL  Hemoglobin : 14.8 g/dL  Hematocrit : 41.8 %  Platelet Count - Automated : 212 K/uL  Mean Cell Volume : 89.7 fl  Mean Cell Hemoglobin : 31.8 pg  Mean Cell Hemoglobin Concentration : 35.4 gm/dL  Auto Neutrophil # : x  Auto Lymphocyte # : x  Auto Monocyte # : x  Auto Eosinophil # : x  Auto Basophil # : x  Auto Neutrophil % : x  Auto Lymphocyte % : x  Auto Monocyte % : x  Auto Eosinophil % : x  Auto Basophil % : x    05-23    142  |  106  |  11  ----------------------------<  99  3.5   |  22  |  1.00    Ca    9.4      23 May 2022 05:47  Phos  2.8     05-23  Mg     2.1     05-23    TPro  6.4  /  Alb  4.0  /  TBili  0.4  /  DBili  0.1  /  AST  20  /  ALT  25  /  AlkPhos  88  05-22        ALLERGIES  No Known Allergies      MEDICATIONS   aspirin  chewable 81 milliGRAM(s) Oral daily  atorvastatin 80 milliGRAM(s) Oral at bedtime  clopidogrel Tablet 75 milliGRAM(s) Oral daily  enoxaparin Injectable 40 milliGRAM(s) SubCutaneous every 24 hours  folic acid 1 milliGRAM(s) Oral daily  latanoprost 0.005% Ophthalmic Solution 1 Drop(s) Both EYES at bedtime  multivitamin 1 Tablet(s) Oral daily  thiamine 100 milliGRAM(s) Oral daily      ----------------------------------------------------------------------------------------  PHYSICAL EXAM  Constitutional - NAD, Comfortable  HEENT - NCAT, EOMI  Chest - Breathing comfortably, No wheezing  Cardiovascular - S1S2   Abdomen - Soft   Extremities - No C/C/E, No calf tenderness   Neurologic Exam -                    Cognitive - AAO to self, place, date, year, situation     Communication - Fluent, No dysarthria     Cranial Nerves - CN 2-12 intact     Motor -                    LEFT    UE - ShAB 5/5, EF 5/5, EE 5/5, WE 5/5,  5/5                    RIGHT UE - ShAB 5/5, EF 5/5, EE 5/5, WE 5/5,  5/5                    LEFT    LE - HF 5/5, KE 5/5, DF 5/5, PF 5/5                    RIGHT LE - HF 5/5, KE 5/5, DF 5/5, PF 5/5        Sensory - Intact to LT     Reflexes - DTR Intact, No primitive reflexive     Coordination - FTN intact  Psychiatric - Mood stable, Affect WNL   CAROLINE ROBERTO is a 67 year old left-handed male with medical history of macular degeneration (no vision loss) and EtOH use (daily 6-7 beers/day, last drink 2 days ago) who presents as a transfer from [facility] for tertiary stroke treatment.  Family at bedside to corroborate story.  Patient flew back from [place] Saturday (5/21) morning and took a cab home.  He said he "didn't feel good".  Later in the day, he was speaking one word sentences.  This morning when he woke up, family noted that he could not complete a full sentence.  Initial CTH 5/22/2022 with subacute left MCA territory infarct in the frontal lobe.  CTA with severe focal narrowing of left supraclinoid ICA extending to the carotid terminus, as well as the diminished opacification of the left anterior cerebral artery.  CTP with large penumbra in left MCA territory with mismatch volume of 12.7cc.  Patient transferred to [facility].  Stroke score of 1 for mild non-fluent aphasia.    NIHSS 1  preMRS 0 (22 May 2022 17:23)    Patient with persistent functional deficits.  Neuro work up in progress.    REVIEW OF SYSTEMS  Constitutional - No fever, +fatigue  HEENT - No visual disturbances, No vertigo, No neck pain  Respiratory - No cough, No wheezing, No shortness of breath  Cardiovascular - No chest pain, No palpitations  Gastrointestinal - No abdominal pain, No nausea, No vomiting, No diarrhea, No constipation  Genitourinary - No dysuria, No incontinence  Neurological - + speech difficulty, + poor balance,  No headaches, No memory loss,, No numbness  Skin - No itching, No rashes, No lesions   Musculoskeletal - No joint pain, No joint swelling, No muscle pain  Psychiatric - No depression, No anxiety    PAST MEDICAL & SURGICAL HISTORY  Glaucoma    Osteoarthritis    Knee pain, left    Elevated liver enzymes    S/P knee surgery    S/P inguinal hernia repair    Status post total left knee replacement    S/P colonoscopy with polypectomy    FUNCTIONAL HISTORY  Lives w spouse in house in [place].  PTA Independent in ADLs and ambulation PTA    SOCIAL HISTORY - as per documentation/history  Smoking - None  EtOH - daily EtOH use 6-7 beers/day  Drugs - None    FAMILY HISTORY   Family history of brain cancer (Mother)    Family history of heart disease (Father)    RECENT LABS - Reviewed  CBC Full  -  ( 23 May 2022 05:49 )  WBC Count : 6.39 K/uL  RBC Count : 4.66 M/uL  Hemoglobin : 14.8 g/dL  Hematocrit : 41.8 %  Platelet Count - Automated : 212 K/uL  Mean Cell Volume : 89.7 fl  Mean Cell Hemoglobin : 31.8 pg  Mean Cell Hemoglobin Concentration : 35.4 gm/dL  Auto Neutrophil # : x  Auto Lymphocyte # : x  Auto Monocyte # : x  Auto Eosinophil # : x  Auto Basophil # : x  Auto Neutrophil % : x  Auto Lymphocyte % : x  Auto Monocyte % : x  Auto Eosinophil % : x  Auto Basophil % : x    05-23    142  |  106  |  11  ----------------------------<  99  3.5   |  22  |  1.00    Ca    9.4      23 May 2022 05:47  Phos  2.8     05-23  Mg     2.1     05-23    TPro  6.4  /  Alb  4.0  /  TBili  0.4  /  DBili  0.1  /  AST  20  /  ALT  25  /  AlkPhos  88  05-22    ALLERGIES  No Known Allergies    MEDICATIONS   aspirin  chewable 81 milliGRAM(s) Oral daily  atorvastatin 80 milliGRAM(s) Oral at bedtime  clopidogrel Tablet 75 milliGRAM(s) Oral daily  enoxaparin Injectable 40 milliGRAM(s) SubCutaneous every 24 hours  folic acid 1 milliGRAM(s) Oral daily  latanoprost 0.005% Ophthalmic Solution 1 Drop(s) Both EYES at bedtime  multivitamin 1 Tablet(s) Oral daily  thiamine 100 milliGRAM(s) Oral daily    VITALS  T(C): 36.4 (05-23-22 @ 08:05), Max: 37.1 (05-22-22 @ 15:40)  HR: 58 (05-23-22 @ 08:05) (53 - 63)  BP: 191/80 (05-23-22 @ 08:05) (129/77 - 193/101)  RR: 13 (05-23-22 @ 08:05) (12 - 18)  SpO2: 98% (05-23-22 @ 08:05) (96% - 100%)  Wt(kg): --    PHYSICAL EXAM  Constitutional - NAD, Comfortable  HEENT - Small eccymoses R eye, EOMI  Chest - Breathing comfortably, No wheezing  Cardiovascular - S1S2   Abdomen - Soft   Extremities - No C/C/E, No calf tenderness   Neurologic Exam -                 Alert  Follows verbal instruction  + slight non-fluent aphasia  motor 4+/5 bl UE/LEs  no clonus                 Psychiatric - Mood stable, Affect WNL

## 2022-05-23 NOTE — OCCUPATIONAL THERAPY INITIAL EVALUATION ADULT - LIVES WITH, PROFILE
Pt lives with girlfriend in private home +2 GRETA and 1 step inside. Pt reports independence with self care and functional mobility without AD PTA.

## 2022-05-23 NOTE — PHYSICAL THERAPY INITIAL EVALUATION ADULT - GENERAL OBSERVATIONS, REHAB EVAL
Pt received seated in chair, in NAD, +IV Lock, +Tele, +pulsox, +BP cuff. Pt AOx4, however, pt has delayed response to some questions with word finding or remembering difficulties. Pt received seated in chair, in NAD, +IV Lock, +Tele, +pulsox, +BP cuff. Pt AOx4, however, pt has delayed response to some questions with word finding and name remembering difficulties. Pt received seated in chair, in NAD, +IV Lock, +Tele, +pulsox, +BP cuff. Pt AOx4, however, pt w/expressive aphasia with delayed response to some questions and word finding and name remembering difficulties.

## 2022-05-23 NOTE — OCCUPATIONAL THERAPY INITIAL EVALUATION ADULT - PRECAUTIONS/LIMITATIONS, REHAB EVAL
(CONT) He said he "didn't feel good".  Later in the day, he was speaking one word sentences. This morning when he woke up, family noted that he could not complete a full sentence. Initial CTH 5/22/2022 with subacute left MCA territory infarct in the frontal lobe. CTA with severe focal narrowing of left supraclinoid ICA extending to the carotid terminus, as well as the diminished opacification of the left anterior cerebral artery.  CTP with large penumbra in left MCA territory with mismatch volume of 12.7cc. Patient transferred to [facility]. Stroke score of 1 for mild non-fluent aphasia. MRA HEAD: Acute left middle cerebral artery infarct similar to that predicted on the CT perfusion. No hemorrhage. Marked narrowing of the left supraclinoid internal carotid artery with diminished antegrade flow in the left anterior and middle cerebral arteries. No significant cross-filling from the right anterior communicating artery or the left posterior communicating artery./fall precautions

## 2022-05-23 NOTE — CHART NOTE - NSCHARTNOTEFT_GEN_A_CORE
Interventional Neuro- Radiology   Procedure Note    Procedure: Selective Cerebral Angiography   Pre- Procedure Diagnosis: Left ICA stenosis  Post- Procedure Diagnosis: Left ICA stenosis     : Dr. Adam MD  Fellow: Dr. Osorio   Physician Assistant: Nighat Murphy PA-C    RN: Mame   Tech: Tomi       I/Os:  Fluids: 25cc  Newman: DTV   Contrast: 82cc   Estimated Blood Loss: <10cc      Preliminary Report:  Using a 5Fr short sheath to the right groin examination of left vertebral artery/ left internal carotid artery/ left external carotid artery/ right internal carotid artery/ right external carotid artery via selective cerebral angiography demonstrates severe right ICA stenosis. ( Official note to follow).    Patient tolerated procedure well, vital signs stable, hemodynamically stable, no change in neurological status compared to baseline. Results discussed with patient and their family. Groin sheath d/c'ed, manual compression held to hemostasis, no active bleeding, no hematoma, Vascade applied, quick clot and safeguard balloon dressing applied at 1650h.  Patient transferred to IR recovery for further care/ monitoring.     Nighat Murphy PA-C  [phone]

## 2022-05-23 NOTE — PROGRESS NOTE ADULT - SUBJECTIVE AND OBJECTIVE BOX
Patient seen and examined s/p diagnostic angiogram.     Doing well. Awake, alert, oriented x 3. Mild word finding difficulty. SARAVIA strongly. SILT. Wound intact.    T(C): 37 (05-23-22 @ 17:00), Max: 37.1 (05-23-22 @ 00:00)  HR: 66 (05-23-22 @ 18:30) (55 - 69)  BP: 162/87 (05-23-22 @ 18:30) (129/77 - 191/80)  RR: 17 (05-23-22 @ 18:30) (13 - 19)  SpO2: 100% (05-23-22 @ 18:30) (96% - 100%)                          14.8   6.39  )-----------( 212      ( 23 May 2022 05:49 )             41.8     05-23    142  |  106  |  11  ----------------------------<  99  3.5   |  22  |  1.00    Ca    9.4      23 May 2022 05:47  Phos  2.8     05-23  Mg     2.1     05-23    TPro  6.4  /  Alb  4.0  /  TBili  0.4  /  DBili  0.1  /  AST  20  /  ALT  25  /  AlkPhos  88  05-22    PT/INR - ( 22 May 2022 17:29 )   PT: 11.9 sec;   INR: 1.03 ratio         PTT - ( 22 May 2022 17:29 )  PTT:30.5 sec        CAPILLARY BLOOD GLUCOSE

## 2022-05-23 NOTE — CONSULT NOTE ADULT - ASSESSMENT
ASSESSMENT/PLAN  67yMale with functional deficits after L MCA CVA  L MCA CVA - pending MRI, pending PT/OT eval on ASA, Plavix, statin  dysphagia - puree diet, speech/swallow eval pending.   Pain - Tylenol  DVT PPX - SCDs, lovenox ASSESSMENT/PLAN  67yMale with functional deficits after L MCA CVA w gait dysfunction, aphasia  L MCA CVA - pending MRI, pending PT/OT eval on ASA, Plavix, statin  dysphagia - puree diet, speech/swallow eval pending.   Pain - Tylenol  DVT PPX - SCDs, lovenox  Dispo- Acute rehab- can tolerate 3h/d of therapies and requires daily physician visits.  D/W patient and wife at bedside rehab options and functional prognosis.

## 2022-05-23 NOTE — PROGRESS NOTE ADULT - ASSESSMENT
67M adm stroke neuro. Starting having some difficulty speaking yesterday, worse this AM. Speaks in short sentences and has wfd. CTH showing L MCA infarct in frontal lobe. CTA/CTP showing L MCA territory penumbra (mismatch volume 12.7); dec contrast of L ICA w/ severe narrowing of L supraclinoid ICA ext to carotid term; dec contrast of L YVAN and dec caliber/filling of L MCA branches. Exam: some wfd otherwsise AOx3, FC, PERRL, EOMI, no facial, 5/5 throughout, no drift.  -S/p cerebral angiogram showing severe L ICA stenosis  -Will discuss further plans with stroke team  -PACU Floor  -Q4 neurochecks  -Q4 vitals  -Pain control  -Advance diet as tolerated  -PT/OT   -Groin check in AM

## 2022-05-23 NOTE — OCCUPATIONAL THERAPY INITIAL EVALUATION ADULT - PERTINENT HX OF CURRENT PROBLEM, REHAB EVAL
67M with medical history of macular degeneration (no vision loss) and ETOH use (daily 6-7 beers/day, last drink 2 days ago) who presents as a transfer from [facility] for tertiary stroke treatment.  Family at bedside to corroborate story. Patient flew back from [place] Saturday (5/21) morning and took a cab home. (CONT BELOW)

## 2022-05-23 NOTE — PROGRESS NOTE ADULT - SUBJECTIVE AND OBJECTIVE BOX
Patient seen and examined at bedside.    --Anticoagulation--  aspirin  chewable 81 milliGRAM(s) Oral daily  enoxaparin Injectable 40 milliGRAM(s) SubCutaneous every 24 hours    T(C): 36.4 (05-23-22 @ 08:05), Max: 37.1 (05-22-22 @ 15:40)  HR: 58 (05-23-22 @ 06:00) (53 - 64)  BP: 174/79 (05-23-22 @ 06:00) (129/77 - 193/101)  RR: 15 (05-23-22 @ 06:00) (12 - 18)  SpO2: 96% (05-23-22 @ 06:00) (96% - 100%)  Wt(kg): --    Exam: AOx3, EOMI, no facial, no drift, SARAVIA 5/5, minimal expressive aphasia this AM

## 2022-05-23 NOTE — CHART NOTE - NSCHARTNOTEFT_GEN_A_CORE
Interventional Neuro Radiology  Pre-Procedure Note    This is a 67 year old left-handed male with medical history of macular degeneration (no vision loss) and EtOH use (daily 6-7 beers/day, last drink 2 days ago) who presents as a transfer from [facility] for tertiary stroke treatment.  Family at bedside to corroborate story.  Patient flew back from [place] Saturday (5/21) morning and took a cab home.  He said he "didn't feel good".  Later in the day, he was speaking one word sentences.  This morning when he woke up, family noted that he could not complete a full sentence.  Initial CTH 5/22/2022 with subacute left MCA territory infarct in the frontal lobe.  CTA with severe focal narrowing of left supraclinoid ICA extending to the carotid terminus, as well as the diminished opacification of the left anterior cerebral artery.  CTP with large penumbra in left MCA territory with mismatch volume of 12.7cc.  Patient transferred to [facility].  Stroke score of 1 for mild non-fluent aphasia. Patient presents now to neuro IR for selective cerebral angiography.     Neuro Exam: Awake and alert, oriented x3, fluent, normal naming and repetition, follows 3 step commands. Extraocular movements intact, no nystagmus, visual fields full, face symmetric, tongue midline. No drift, 5/5 power x 4 extremities. Normal sensation to LT. Normal finger-to-nose and rapid alternating movements.    PAST MEDICAL & SURGICAL HISTORY:  Glaucoma  Osteoarthritis  Knee pain, left s/p TKR 8/15  Elevated liver enzymes s/p tylenol use after TKR  S/P knee surgery  left knee at age 15  S/P inguinal hernia repair right  Status post total left knee replacement 8/2015  S/P colonoscopy with polypectomy benign, 2 years ago    Social History:   Denies tobacco use    FAMILY HISTORY:  Family history of brain cancer (Mother)  Family history of heart disease (Father)    Allergies:   No Known Allergies      Current Medications:   aspirin  chewable 81 milliGRAM(s) Oral daily  atorvastatin 80 milliGRAM(s) Oral at bedtime  clopidogrel Tablet 75 milliGRAM(s) Oral daily  enoxaparin Injectable 40 milliGRAM(s) SubCutaneous every 24 hours  folic acid 1 milliGRAM(s) Oral daily  latanoprost 0.005% Ophthalmic Solution 1 Drop(s) Both EYES at bedtime  multivitamin 1 Tablet(s) Oral daily  thiamine 100 milliGRAM(s) Oral daily      Labs:                         14.8   6.39  )-----------( 212      ( 23 May 2022 05:49 )             41.8       05-23    142  |  106  |  11  ----------------------------<  99  3.5   |  22  |  1.00    Ca    9.4      23 May 2022 05:47  Phos  2.8     05-23  Mg     2.1     05-23    TPro  6.4  /  Alb  4.0  /  TBili  0.4  /  DBili  0.1  /  AST  20  /  ALT  25  /  AlkPhos  88  05-22      Assessment/Plan:   This is a 66yo male  presents with ICa stenosis. Patient presents to neuro-IR for selective cerebral angiography. Procedure/ risks/ benefits/ goals/ alternatives were explained. Risks include but are not limited to stroke/ vessel injury/ hemorrhage/ groin hematoma. All questions answered. Informed content obtained from patient. Consent placed in chart.     Nighat Murphy PA-C  [phone] Interventional Neuro Radiology  Pre-Procedure Note    This is a 67 year old left-handed male with medical history of macular degeneration (no vision loss) and EtOH use (daily 6-7 beers/day, last drink 2 days ago) who presents as a transfer from [facility] for tertiary stroke treatment.  Family at bedside to corroborate story.  Patient flew back from [place] Saturday (5/21) morning and took a cab home.  He said he "didn't feel good".  Later in the day, he was speaking one word sentences.  This morning when he woke up, family noted that he could not complete a full sentence.  Initial CTH 5/22/2022 with subacute left MCA territory infarct in the frontal lobe.  CTA with severe focal narrowing of left supraclinoid ICA extending to the carotid terminus, as well as the diminished opacification of the left anterior cerebral artery.  CTP with large penumbra in left MCA territory with mismatch volume of 12.7cc.  Patient transferred to [facility].  Stroke score of 1 for mild non-fluent aphasia. Patient presents now to neuro IR for selective cerebral angiography.     Neuro Exam: Awake and alert, oriented x3, fluent, normal naming and repetition, follows 3 step commands. Extraocular movements intact, no nystagmus, visual fields full, face symmetric, tongue midline. No drift, 5/5 power x 4 extremities. Normal sensation to LT. Normal finger-to-nose and rapid alternating movements.    PAST MEDICAL & SURGICAL HISTORY:  Glaucoma  Osteoarthritis  Knee pain, left s/p TKR 8/15  Elevated liver enzymes s/p tylenol use after TKR  S/P knee surgery  left knee at age 15  S/P inguinal hernia repair right  Status post total left knee replacement 8/2015  S/P colonoscopy with polypectomy benign, 2 years ago    Social History:   Denies tobacco use    FAMILY HISTORY:  Family history of brain cancer (Mother)  Family history of heart disease (Father)    Allergies:   No Known Allergies      Current Medications:   aspirin  chewable 81 milliGRAM(s) Oral daily  atorvastatin 80 milliGRAM(s) Oral at bedtime  clopidogrel Tablet 75 milliGRAM(s) Oral daily  enoxaparin Injectable 40 milliGRAM(s) SubCutaneous every 24 hours  folic acid 1 milliGRAM(s) Oral daily  latanoprost 0.005% Ophthalmic Solution 1 Drop(s) Both EYES at bedtime  multivitamin 1 Tablet(s) Oral daily  thiamine 100 milliGRAM(s) Oral daily      Labs:                         14.8   6.39  )-----------( 212      ( 23 May 2022 05:49 )             41.8       05-23    142  |  106  |  11  ----------------------------<  99  3.5   |  22  |  1.00    Ca    9.4      23 May 2022 05:47  Phos  2.8     05-23  Mg     2.1     05-23    TPro  6.4  /  Alb  4.0  /  TBili  0.4  /  DBili  0.1  /  AST  20  /  ALT  25  /  AlkPhos  88  05-22      Assessment/Plan:   This is a 68yo male  presents with ICa stenosis. Patient presents to neuro-IR for selective cerebral angiography. Procedure/ risks/ benefits/ goals/ alternatives were explained. Risks include but are not limited to stroke/ vessel injury/ hemorrhage/ groin hematoma. All questions answered. Informed content obtained from patient's sister in law. Consent placed in chart.     Nighat Murphy PA-C  [phone]

## 2022-05-23 NOTE — PHYSICAL THERAPY INITIAL EVALUATION ADULT - ADDITIONAL COMMENTS
Pt states he lives with female friend in a pvt house with 2 steps to enter and no stairs inside.  Pt is a retired . He was functionally independent prior to admission and did not use an assistive device.

## 2022-05-24 LAB
ANION GAP SERPL CALC-SCNC: 12 MMOL/L — SIGNIFICANT CHANGE UP (ref 5–17)
BUN SERPL-MCNC: 10 MG/DL — SIGNIFICANT CHANGE UP (ref 7–23)
CALCIUM SERPL-MCNC: 8.8 MG/DL — SIGNIFICANT CHANGE UP (ref 8.4–10.5)
CHLORIDE SERPL-SCNC: 106 MMOL/L — SIGNIFICANT CHANGE UP (ref 96–108)
CO2 SERPL-SCNC: 23 MMOL/L — SIGNIFICANT CHANGE UP (ref 22–31)
CREAT SERPL-MCNC: 0.97 MG/DL — SIGNIFICANT CHANGE UP (ref 0.5–1.3)
EGFR: 85 ML/MIN/1.73M2 — SIGNIFICANT CHANGE UP
GLUCOSE SERPL-MCNC: 102 MG/DL — HIGH (ref 70–99)
HCT VFR BLD CALC: 40.7 % — SIGNIFICANT CHANGE UP (ref 39–50)
HGB BLD-MCNC: 14.3 G/DL — SIGNIFICANT CHANGE UP (ref 13–17)
MCHC RBC-ENTMCNC: 31.3 PG — SIGNIFICANT CHANGE UP (ref 27–34)
MCHC RBC-ENTMCNC: 35.1 GM/DL — SIGNIFICANT CHANGE UP (ref 32–36)
MCV RBC AUTO: 89.1 FL — SIGNIFICANT CHANGE UP (ref 80–100)
NRBC # BLD: 0 /100 WBCS — SIGNIFICANT CHANGE UP (ref 0–0)
PLATELET # BLD AUTO: 171 K/UL — SIGNIFICANT CHANGE UP (ref 150–400)
POTASSIUM SERPL-MCNC: 3.5 MMOL/L — SIGNIFICANT CHANGE UP (ref 3.5–5.3)
POTASSIUM SERPL-SCNC: 3.5 MMOL/L — SIGNIFICANT CHANGE UP (ref 3.5–5.3)
RBC # BLD: 4.57 M/UL — SIGNIFICANT CHANGE UP (ref 4.2–5.8)
RBC # FLD: 12.7 % — SIGNIFICANT CHANGE UP (ref 10.3–14.5)
SODIUM SERPL-SCNC: 141 MMOL/L — SIGNIFICANT CHANGE UP (ref 135–145)
WBC # BLD: 7.02 K/UL — SIGNIFICANT CHANGE UP (ref 3.8–10.5)
WBC # FLD AUTO: 7.02 K/UL — SIGNIFICANT CHANGE UP (ref 3.8–10.5)

## 2022-05-24 PROCEDURE — 99233 SBSQ HOSP IP/OBS HIGH 50: CPT

## 2022-05-24 PROCEDURE — 99232 SBSQ HOSP IP/OBS MODERATE 35: CPT

## 2022-05-24 PROCEDURE — 93970 EXTREMITY STUDY: CPT | Mod: 26

## 2022-05-24 RX ADMIN — CLOPIDOGREL BISULFATE 75 MILLIGRAM(S): 75 TABLET, FILM COATED ORAL at 10:59

## 2022-05-24 RX ADMIN — LATANOPROST 1 DROP(S): 0.05 SOLUTION/ DROPS OPHTHALMIC; TOPICAL at 21:40

## 2022-05-24 RX ADMIN — ATORVASTATIN CALCIUM 80 MILLIGRAM(S): 80 TABLET, FILM COATED ORAL at 21:40

## 2022-05-24 RX ADMIN — ENOXAPARIN SODIUM 40 MILLIGRAM(S): 100 INJECTION SUBCUTANEOUS at 21:40

## 2022-05-24 RX ADMIN — Medication 100 MILLIGRAM(S): at 10:59

## 2022-05-24 RX ADMIN — Medication 81 MILLIGRAM(S): at 10:59

## 2022-05-24 RX ADMIN — Medication 1 TABLET(S): at 11:00

## 2022-05-24 RX ADMIN — Medication 1 MILLIGRAM(S): at 10:59

## 2022-05-24 NOTE — PROGRESS NOTE ADULT - ASSESSMENT
67 year old left-handed male with medical history of macular degeneration (no vision loss) and EtOH use (daily 6-7 beers/day, last drink 2 days ago) who presents as a transfer from [facility] for tertiary stroke treatment.  Family at bedside to corroborate story.  Patient flew back from [place] Saturday (5/21) morning and took a cab home.  He said he "didn't feel good".  Later in the day, he was speaking one word sentences.  This morning when he woke up, family noted that he could not complete a full sentence.  Initial CTH 5/22/2022 with subacute left MCA territory infarct in the frontal lobe.  CTA with severe focal narrowing of left supraclinoid ICA extending to the carotid terminus, as well as the diminished opacification of the left anterior cerebral artery.  CTP with large penumbra in left MCA territory with mismatch volume of 12.7cc.  Patient transferred to [facility].  Stroke score of 1 for mild non-fluent aphasia.    Impression.  Mild-moderate or moderate transcortical motor aphasia, due to left hemispheric border zone infarction, with left supraclinoid ICA and M1 stenosis.  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  For now, will treat for symptomatic intracranial atherosclerosis and reassess if further investigation suggests moyamoya.    NEURO: Continue close monitoring for neurologic deterioration, permissive HTN to gradual normotension, titrate statin to LDL goal less than 70, MRI Brain w/o, MRA Head and Neck w/o results as noted above. Physical therapy/OT recommending acute rehab.     ANTITHROMBOTIC THERAPY: Aspirin and Plavix for 3 months.    PULMONARY: Protecting airway, saturating well     CARDIOVASCULAR: TTE shows EF of 65%, Normal left ventricular systolic function. No segmental wall motion abnormalities, continue with cardiac monitoring.                              SBP goal: Permissive HTN to gradual normotension    GASTROINTESTINAL:  dysphagia screen passed, tolerating diet      Diet: Regular     RENAL: BUN/Cr without acute change, good urine output      Na Goal: Greater than 135     Newman: No    HEMATOLOGY: H/H without acute change, Platelets 171, no signs of bleeding.      DVT ppx: Heparin s.c [] LMWH [x]     ID: afebrile, no leukocytosis, no signs or symptoms of infection.     OTHER: All questions and concerns addressed with patient at bedside.     DISPOSITION: Rehab once stable and workup is complete.    CORE MEASURES:        Admission NIHSS: 1     TPA: [] YES [x] NO      LDL/HDL: 140/48     Depression Screen: p     Statin Therapy: y     Dysphagia Screen: [x] PASS [] FAIL     Smoking [] YES [x] NO      Afib [] YES [x] NO     Stroke Education [x] YES [] NO    Obtain screening lower extremity venous ultrasound in patients who meet 1 or more of the following criteria as patient is high risk for DVT/PE on admission:   [] History of DVT/PE  []Hypercoagulable states (Factor V Leiden, Cancer, OCP, etc. )  [x]Prolonged immobility (hemiplegia/hemiparesis/post operative or any other extended immobilization)- flight   [] Transferred from outside facility (Rehab or Long term care)  [] Age </= to 50.   67 year old left-handed male with medical history of macular degeneration (no vision loss) and EtOH use (daily 6-7 beers/day, last drink 2 days ago) who presents as a transfer from [facility] for tertiary stroke treatment.  Family at bedside to corroborate story.  Patient flew back from [place] Saturday (5/21) morning and took a cab home.  He said he "didn't feel good".  Later in the day, he was speaking one word sentences.  This morning when he woke up, family noted that he could not complete a full sentence.  Initial CTH 5/22/2022 with subacute left MCA territory infarct in the frontal lobe.  CTA with severe focal narrowing of left supraclinoid ICA extending to the carotid terminus, as well as the diminished opacification of the left anterior cerebral artery.  CTP with large penumbra in left MCA territory with mismatch volume of 12.7cc.  Patient transferred to [facility].  Stroke score of 1 for mild non-fluent aphasia.    Impression.  Mild-moderate or moderate transcortical motor aphasia, due to left hemispheric border zone infarction, with left supraclinoid ICA and M1 stenosis.  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  For now, will treat for symptomatic intracranial atherosclerosis and reassess if further investigation suggests moyamoya.    NEURO: Continue close monitoring for neurologic deterioration, permissive HTN to gradual normotension, titrate statin to LDL goal less than 70, MRI Brain w/o, MRA Head and Neck w/o results as noted above. Physical therapy/OT recommending acute rehab.     ANTITHROMBOTIC THERAPY: Aspirin and Plavix for 3 months.    PULMONARY: Protecting airway, saturating well     CARDIOVASCULAR: TTE shows EF of 65%, Normal left ventricular systolic function. No segmental wall motion abnormalities, continue with cardiac monitoring.                              SBP goal: Permissive HTN to gradual normotension    GASTROINTESTINAL:  dysphagia screen passed, tolerating diet      Diet: Regular     RENAL: BUN/Cr without acute change, good urine output      Na Goal: Greater than 135     Newman: No    HEMATOLOGY: H/H without acute change, Platelets 171, no signs of bleeding.      DVT ppx: Heparin s.c [] LMWH [x]     ID: afebrile, no leukocytosis, no signs or symptoms of infection.     OTHER: All questions and concerns addressed with patient and family at bedside.     DISPOSITION: Rehab once stable and workup is complete.    CORE MEASURES:        Admission NIHSS: 1     TPA: [] YES [x] NO      LDL/HDL: 140/48     Depression Screen: p     Statin Therapy: y     Dysphagia Screen: [x] PASS [] FAIL     Smoking [] YES [x] NO      Afib [] YES [x] NO     Stroke Education [x] YES [] NO    Obtain screening lower extremity venous ultrasound in patients who meet 1 or more of the following criteria as patient is high risk for DVT/PE on admission:   [] History of DVT/PE  []Hypercoagulable states (Factor V Leiden, Cancer, OCP, etc. )  [x]Prolonged immobility (hemiplegia/hemiparesis/post operative or any other extended immobilization)- flight   [] Transferred from outside facility (Rehab or Long term care)  [] Age </= to 50.   67 year old left-handed male with medical history of macular degeneration (no vision loss) and EtOH use (daily 6-7 beers/day, last drink 2 days ago) who presents as a transfer from [facility] for tertiary stroke treatment.  Family at bedside to corroborate story.  Patient flew back from [place] Saturday (5/21) morning and took a cab home.  He said he "didn't feel good".  Later in the day, he was speaking one word sentences.  This morning when he woke up, family noted that he could not complete a full sentence.  Initial CTH 5/22/2022 with subacute left MCA territory infarct in the frontal lobe.  CTA with severe focal narrowing of left supraclinoid ICA extending to the carotid terminus, as well as the diminished opacification of the left anterior cerebral artery.  CTP with large penumbra in left MCA territory with mismatch volume of 12.7cc.  Patient transferred to [facility].  Stroke score of 1 for mild non-fluent aphasia.    Impression.  Mild-moderate or moderate transcortical motor aphasia, due to left hemispheric border zone infarction, with left supraclinoid ICA and M1 stenosis.  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 For now, will treat for symptomatic intracranial atherosclerosis.    NEURO: Continue close monitoring for neurologic deterioration, permissive HTN to gradual normotension, titrate statin to LDL goal less than 70, MRI Brain w/o, MRA Head and Neck w/o results as noted above. Physical therapy/OT recommending acute rehab.     ANTITHROMBOTIC THERAPY: Aspirin and Plavix for 3 months.    PULMONARY: Protecting airway, saturating well     CARDIOVASCULAR: TTE shows EF of 65%, Normal left ventricular systolic function. No segmental wall motion abnormalities, continue with cardiac monitoring.                              SBP goal: Permissive HTN to gradual normotension    GASTROINTESTINAL:  dysphagia screen passed, tolerating diet      Diet: Regular     RENAL: BUN/Cr without acute change, good urine output      Na Goal: Greater than 135     Newman: No    HEMATOLOGY: H/H without acute change, Platelets 171, no signs of bleeding.      DVT ppx: Heparin s.c [] LMWH [x]     ID: afebrile, no leukocytosis, no signs or symptoms of infection.     OTHER: All questions and concerns addressed with patient and family at bedside.     DISPOSITION: Rehab once stable and workup is complete.    CORE MEASURES:        Admission NIHSS: 1     TPA: [] YES [x] NO      LDL/HDL: 140/48     Depression Screen: p     Statin Therapy: y     Dysphagia Screen: [x] PASS [] FAIL     Smoking [] YES [x] NO      Afib [] YES [x] NO     Stroke Education [x] YES [] NO    Obtain screening lower extremity venous ultrasound in patients who meet 1 or more of the following criteria as patient is high risk for DVT/PE on admission:   [] History of DVT/PE  []Hypercoagulable states (Factor V Leiden, Cancer, OCP, etc. )  [x]Prolonged immobility (hemiplegia/hemiparesis/post operative or any other extended immobilization)- flight   [] Transferred from outside facility (Rehab or Long term care)  [] Age </= to 50.

## 2022-05-24 NOTE — PROGRESS NOTE ADULT - ASSESSMENT
ASSESSMENT/PLAN  67yMale with functional deficits after L MCA CVA w gait dysfunction, aphasia  PT- ROM, Bed mob, transfers, amb w AD  OT- ADLs  SLP- Aphasia/Dysphagia eval and tx  Prec- Falls, Cardiac  DVT px - Lovenox  Skin- Turn q2h  Dispo- Acute Rehab- can tolerate 3h/d of therapies and requires daily physician visits  D/W patient and his wife rehab options and functional prognosis.

## 2022-05-24 NOTE — PROGRESS NOTE ADULT - SUBJECTIVE AND OBJECTIVE BOX
THE PATIENT WAS SEEN AND EXAMINED BY ME WITH THE HOUSESTAFF AND STROKE TEAM DURING MORNING ROUNDS.   HPI:67 year old left-handed male with medical history of macular degeneration (no vision loss) and EtOH use (daily 6-7 beers/day, last drink 2 days ago) who presents as a transfer from [facility] for tertiary stroke treatment.  Family at bedside to corroborate story.  Patient flew back from [place] Saturday (5/21) morning and took a cab home.  He said he "didn't feel good".  Later in the day, he was speaking one word sentences.  This morning when he woke up, family noted that he could not complete a full sentence.  Initial CTH 5/22/2022 with subacute left MCA territory infarct in the frontal lobe.  CTA with severe focal narrowing of left supraclinoid ICA extending to the carotid terminus, as well as the diminished opacification of the left anterior cerebral artery.  CTP with large penumbra in left MCA territory with mismatch volume of 12.7cc.  Patient transferred to [facility].  Stroke score of 1 for mild non-fluent aphasia.    NIHSS 1  preMRS 0     SUBJECTIVE: No events overnight.  No new neurologic complaints.      aspirin  chewable 81 milliGRAM(s) Oral daily  atorvastatin 80 milliGRAM(s) Oral at bedtime  clopidogrel Tablet 75 milliGRAM(s) Oral daily  enoxaparin Injectable 40 milliGRAM(s) SubCutaneous every 24 hours  folic acid 1 milliGRAM(s) Oral daily  latanoprost 0.005% Ophthalmic Solution 1 Drop(s) Both EYES at bedtime  multivitamin 1 Tablet(s) Oral daily  thiamine 100 milliGRAM(s) Oral daily    PHYSICAL EXAM:   Vital Signs Last 24 Hrs  T(C): 36.6 (23 May 2022 20:15), Max: 37 (23 May 2022 17:00)  T(F): 97.8 (23 May 2022 20:15), Max: 98.6 (23 May 2022 17:00)  HR: 55 (24 May 2022 06:00) (54 - 69)  BP: 149/72 (24 May 2022 06:00) (129/85 - 175/89)  BP(mean): 95 (24 May 2022 06:00) (90 - 110)  RR: 13 (24 May 2022 06:00) (12 - 19)  SpO2: 96% (24 May 2022 06:00) (94% - 100%)    General: No acute distress  HEENT: EOM intact, visual fields full  Abdomen: Soft, nontender, nondistended   Extremities: No edema    NEUROLOGICAL EXAM:  Mental status: Awake, alert, oriented x3, no aphasia, no neglect, normal memory   Cranial Nerves: No facial asymmetry, no nystagmus, no dysarthria,  tongue midline  Motor exam: Normal tone, no drift, 5/5 RUE, 5/5 RLE, 5/5 LUE, 5/5 LLE, normal fine finger movements.  Sensation: Intact to light touch   Coordination/ Gait: No dysmetria, TALI intact and symmetric bilaterally        LABS:                        14.3   7.02  )-----------( 171      ( 24 May 2022 04:55 )             40.7    05-24    141  |  106  |  10  ----------------------------<  102<H>  3.5   |  23  |  0.97    Ca    8.8      24 May 2022 04:55  Phos  2.8     05-23  Mg     2.1     05-23    TPro  6.4  /  Alb  4.0  /  TBili  0.4  /  DBili  0.1  /  AST  20  /  ALT  25  /  AlkPhos  88  05-22  PT/INR - ( 22 May 2022 17:29 )   PT: 11.9 sec;   INR: 1.03 ratio         PTT - ( 22 May 2022 17:29 )  PTT:30.5 sec      IMAGING: Reviewed by me.     MR Head/MRA Head and Neck (5/23/22):  Acute left middle cerebral artery infarct similar to that predicted on the CT perfusion. No hemorrhage. Marked narrowing of the left supraclinoid internal carotid artery with diminished antegrade flow in the left anterior and middle cerebral arteries. No significant cross-filling from the right anterior communicating artery or the left posterior communicating artery. Markedly elevated flow in the left posterior cerebral artery suggesting chronicity with collateralization.    Noninvasive flow MR angiography as above.    5/22/22:  CT PERFUSION: Large ischemic penumbra in the left middle cerebral artery territory with mismatch volume of 12.7.    CTA COW:   Diminished opacification of the left internal carotid artery, with focal severe narrowing of the left supraclinoid ICA extending to the carotid terminus, as well as diminished opacification of the left anterior cerebral artery and diminished caliber and opacification of left middle cerebral artery branches, including markedly severe effacement of the left M1 segment. No vessel cut off.    CTA NECK:   Patent, ECAs, ICAs, although diffusely decreased caliber of the left internal carotid artery extending to the skull base, cannot exclude a dissection. Atherosclerotic plaque at both carotid bifurcations with less than 50% stenosis at the ICA origins by NASCET criteria. Bilateral vertebral arteries are patent without flow limiting stenosis.    CTH 5/22/22:  Subacute left middle cerebral artery territory infarct in the frontal lobe. No evidence for hemorrhagic conversion.   THE PATIENT WAS SEEN AND EXAMINED BY ME WITH THE HOUSESTAFF AND STROKE TEAM DURING MORNING ROUNDS.   HPI:67 year old left-handed male with medical history of macular degeneration (no vision loss) and EtOH use (daily 6-7 beers/day, last drink 2 days ago) who presents as a transfer from [facility] for tertiary stroke treatment.  Family at bedside to corroborate story.  Patient flew back from [place] Saturday (5/21) morning and took a cab home.  He said he "didn't feel good".  Later in the day, he was speaking one word sentences.  This morning when he woke up, family noted that he could not complete a full sentence.  Initial CTH 5/22/2022 with subacute left MCA territory infarct in the frontal lobe.  CTA with severe focal narrowing of left supraclinoid ICA extending to the carotid terminus, as well as the diminished opacification of the left anterior cerebral artery.  CTP with large penumbra in left MCA territory with mismatch volume of 12.7cc.  Patient transferred to [facility].  Stroke score of 1 for mild non-fluent aphasia.    NIHSS 1  preMRS 0     SUBJECTIVE: No events overnight.  No new neurologic complaints.      aspirin  chewable 81 milliGRAM(s) Oral daily  atorvastatin 80 milliGRAM(s) Oral at bedtime  clopidogrel Tablet 75 milliGRAM(s) Oral daily  enoxaparin Injectable 40 milliGRAM(s) SubCutaneous every 24 hours  folic acid 1 milliGRAM(s) Oral daily  latanoprost 0.005% Ophthalmic Solution 1 Drop(s) Both EYES at bedtime  multivitamin 1 Tablet(s) Oral daily  thiamine 100 milliGRAM(s) Oral daily    PHYSICAL EXAM:   Vital Signs Last 24 Hrs  T(C): 36.6 (23 May 2022 20:15), Max: 37 (23 May 2022 17:00)  T(F): 97.8 (23 May 2022 20:15), Max: 98.6 (23 May 2022 17:00)  HR: 55 (24 May 2022 06:00) (54 - 69)  BP: 149/72 (24 May 2022 06:00) (129/85 - 175/89)  BP(mean): 95 (24 May 2022 06:00) (90 - 110)  RR: 13 (24 May 2022 06:00) (12 - 19)  SpO2: 96% (24 May 2022 06:00) (94% - 100%)    General: No acute distress  HEENT: EOM intact, BTT less on R  Abdomen: Soft, nontender, nondistended   Extremities: No edema    NEUROLOGICAL EXAM:  Mental status: Awake, alert, oriented to self, date and hospital, speech fluent, moderate dysarthria, follows cross commands with some hesitancy   Cranial Nerves: No facial asymmetry, no nystagmus, moderate dysarthria,  tongue midline  Motor exam: Normal tone, no drift, 5/5 RUE, 5/5 RLE, 5/5 LUE, 5/5 LLE, normal fine finger movements, slightly clumsy on right.  Sensation: Intact to light touch   Coordination/ Gait: No dysmetria, gait deferred         LABS:                        14.3   7.02  )-----------( 171      ( 24 May 2022 04:55 )             40.7    05-24    141  |  106  |  10  ----------------------------<  102<H>  3.5   |  23  |  0.97    Ca    8.8      24 May 2022 04:55  Phos  2.8     05-23  Mg     2.1     05-23    TPro  6.4  /  Alb  4.0  /  TBili  0.4  /  DBili  0.1  /  AST  20  /  ALT  25  /  AlkPhos  88  05-22  PT/INR - ( 22 May 2022 17:29 )   PT: 11.9 sec;   INR: 1.03 ratio         PTT - ( 22 May 2022 17:29 )  PTT:30.5 sec      IMAGING: Reviewed by me.     MR Head/MRA Head and Neck (5/23/22):  Acute left middle cerebral artery infarct similar to that predicted on the CT perfusion. No hemorrhage. Marked narrowing of the left supraclinoid internal carotid artery with diminished antegrade flow in the left anterior and middle cerebral arteries. No significant cross-filling from the right anterior communicating artery or the left posterior communicating artery. Markedly elevated flow in the left posterior cerebral artery suggesting chronicity with collateralization.    Noninvasive flow MR angiography as above.    5/22/22:  CT PERFUSION: Large ischemic penumbra in the left middle cerebral artery territory with mismatch volume of 12.7.    CTA COW:   Diminished opacification of the left internal carotid artery, with focal severe narrowing of the left supraclinoid ICA extending to the carotid terminus, as well as diminished opacification of the left anterior cerebral artery and diminished caliber and opacification of left middle cerebral artery branches, including markedly severe effacement of the left M1 segment. No vessel cut off.    CTA NECK:   Patent, ECAs, ICAs, although diffusely decreased caliber of the left internal carotid artery extending to the skull base, cannot exclude a dissection. Atherosclerotic plaque at both carotid bifurcations with less than 50% stenosis at the ICA origins by NASCET criteria. Bilateral vertebral arteries are patent without flow limiting stenosis.    CTH 5/22/22:  Subacute left middle cerebral artery territory infarct in the frontal lobe. No evidence for hemorrhagic conversion.   THE PATIENT WAS SEEN AND EXAMINED BY ME WITH THE HOUSESTAFF AND STROKE TEAM DURING MORNING ROUNDS.   HPI:67 year old left-handed male with medical history of macular degeneration (no vision loss) and EtOH use (daily 6-7 beers/day, last drink 2 days ago) who presents as a transfer from [facility] for tertiary stroke treatment.  Family at bedside to corroborate story.  Patient flew back from [place] Saturday (5/21) morning and took a cab home.  He said he "didn't feel good".  Later in the day, he was speaking one word sentences.  This morning when he woke up, family noted that he could not complete a full sentence.  Initial CTH 5/22/2022 with subacute left MCA territory infarct in the frontal lobe.  CTA with severe focal narrowing of left supraclinoid ICA extending to the carotid terminus, as well as the diminished opacification of the left anterior cerebral artery.  CTP with large penumbra in left MCA territory with mismatch volume of 12.7cc.  Patient transferred to [facility].  Stroke score of 1 for mild non-fluent aphasia.    NIHSS 1  preMRS 0     SUBJECTIVE: No events overnight.  No new neurologic complaints.      aspirin  chewable 81 milliGRAM(s) Oral daily  atorvastatin 80 milliGRAM(s) Oral at bedtime  clopidogrel Tablet 75 milliGRAM(s) Oral daily  enoxaparin Injectable 40 milliGRAM(s) SubCutaneous every 24 hours  folic acid 1 milliGRAM(s) Oral daily  latanoprost 0.005% Ophthalmic Solution 1 Drop(s) Both EYES at bedtime  multivitamin 1 Tablet(s) Oral daily  thiamine 100 milliGRAM(s) Oral daily    PHYSICAL EXAM:   Vital Signs Last 24 Hrs  T(C): 36.6 (23 May 2022 20:15), Max: 37 (23 May 2022 17:00)  T(F): 97.8 (23 May 2022 20:15), Max: 98.6 (23 May 2022 17:00)  HR: 55 (24 May 2022 06:00) (54 - 69)  BP: 149/72 (24 May 2022 06:00) (129/85 - 175/89)  BP(mean): 95 (24 May 2022 06:00) (90 - 110)  RR: 13 (24 May 2022 06:00) (12 - 19)  SpO2: 96% (24 May 2022 06:00) (94% - 100%)    General: No acute distress  HEENT: EOM intact, BTT less on R  Abdomen: Soft, nontender, nondistended   Extremities: No edema    NEUROLOGICAL EXAM:  Mental status: Awake, alert, oriented to self, date and hospital, speech fluent, moderate dysarthria, follows cross commands with some hesitancy   Cranial Nerves: No facial asymmetry, no nystagmus, moderate dysarthria,  tongue midline  Motor exam: Normal tone, no drift, 5/5 RUE, 5/5 RLE, 5/5 LUE, 5/5 LLE, normal left fine finger movements, slightly clumsy on right.  Sensation: Intact to light touch   Coordination/ Gait: No dysmetria, gait deferred         LABS:                        14.3   7.02  )-----------( 171      ( 24 May 2022 04:55 )             40.7    05-24    141  |  106  |  10  ----------------------------<  102<H>  3.5   |  23  |  0.97    Ca    8.8      24 May 2022 04:55  Phos  2.8     05-23  Mg     2.1     05-23    TPro  6.4  /  Alb  4.0  /  TBili  0.4  /  DBili  0.1  /  AST  20  /  ALT  25  /  AlkPhos  88  05-22  PT/INR - ( 22 May 2022 17:29 )   PT: 11.9 sec;   INR: 1.03 ratio         PTT - ( 22 May 2022 17:29 )  PTT:30.5 sec      IMAGING: Reviewed by me.     MR Head/MRA Head and Neck (5/23/22):  Acute left middle cerebral artery infarct similar to that predicted on the CT perfusion. No hemorrhage. Marked narrowing of the left supraclinoid internal carotid artery with diminished antegrade flow in the left anterior and middle cerebral arteries. No significant cross-filling from the right anterior communicating artery or the left posterior communicating artery. Markedly elevated flow in the left posterior cerebral artery suggesting chronicity with collateralization.    Noninvasive flow MR angiography as above.    5/22/22:  CT PERFUSION: Large ischemic penumbra in the left middle cerebral artery territory with mismatch volume of 12.7.    CTA COW:   Diminished opacification of the left internal carotid artery, with focal severe narrowing of the left supraclinoid ICA extending to the carotid terminus, as well as diminished opacification of the left anterior cerebral artery and diminished caliber and opacification of left middle cerebral artery branches, including markedly severe effacement of the left M1 segment. No vessel cut off.    CTA NECK:   Patent, ECAs, ICAs, although diffusely decreased caliber of the left internal carotid artery extending to the skull base, cannot exclude a dissection. Atherosclerotic plaque at both carotid bifurcations with less than 50% stenosis at the ICA origins by NASCET criteria. Bilateral vertebral arteries are patent without flow limiting stenosis.    CTH 5/22/22:  Subacute left middle cerebral artery territory infarct in the frontal lobe. No evidence for hemorrhagic conversion.

## 2022-05-24 NOTE — PROGRESS NOTE ADULT - SUBJECTIVE AND OBJECTIVE BOX
Patient sitting in chair.   Comfortable.  No CP, no SOB  Supervision w PT; still w poor balance.   Wife at bedside.    MEDICATIONS  (STANDING):  aspirin  chewable 81 milliGRAM(s) Oral daily  atorvastatin 80 milliGRAM(s) Oral at bedtime  clopidogrel Tablet 75 milliGRAM(s) Oral daily  enoxaparin Injectable 40 milliGRAM(s) SubCutaneous every 24 hours  folic acid 1 milliGRAM(s) Oral daily  latanoprost 0.005% Ophthalmic Solution 1 Drop(s) Both EYES at bedtime  multivitamin 1 Tablet(s) Oral daily  thiamine 100 milliGRAM(s) Oral daily    MEDICATIONS  (PRN):    Vital Signs Last 24 Hrs  T(C): 36.6 (23 May 2022 20:15), Max: 37 (23 May 2022 17:00)  T(F): 97.8 (23 May 2022 20:15), Max: 98.6 (23 May 2022 17:00)  HR: 55 (24 May 2022 06:00) (54 - 69)  BP: 149/72 (24 May 2022 06:00) (129/85 - 175/89)  BP(mean): 95 (24 May 2022 06:00) (90 - 110)  RR: 13 (24 May 2022 06:00) (12 - 19)  SpO2: 96% (24 May 2022 06:00) (94% - 100%)    PHYSICAL EXAM  Constitutional - NAD, Comfortable  HEENT - Small eccymoses R eye, EOMI  Chest - Breathing comfortably, No wheezing  Cardiovascular - Cresencio, S1S2   Abdomen - Soft   Extremities - No C/C/E, No calf tenderness   Neurologic Exam -                 Alert  Follows verbal instruction  + non-fluent aphasia  motor 4/5 RUE/RLE; 4+/5 LUE/LLE  no clonus                 Psychiatric - Mood stable, Affect WNL                          14.3   7.02  )-----------( 171      ( 24 May 2022 04:55 )             40.7       05-24    141  |  106  |  10  ----------------------------<  102<H>  3.5   |  23  |  0.97    Ca    8.8      24 May 2022 04:55  Phos  2.8     05-23  Mg     2.1     05-23    TPro  6.4  /  Alb  4.0  /  TBili  0.4  /  DBili  0.1  /  AST  20  /  ALT  25  /  AlkPhos  88  05-22     MR Head No Cont (05.23.22 @ 10:35) >  Acute left middle cerebral artery infarct similar to that   predicted on the CT perfusion. No hemorrhage. Marked narrowing of the   left supraclinoid internal carotid artery with diminished antegrade flow   in the left anterior and middle cerebral arteries. No significant   cross-filling from the right anterior communicating artery or the left   posterior communicating artery.    Noninvasive flow MR angiography as above.

## 2022-05-25 LAB
ANION GAP SERPL CALC-SCNC: 13 MMOL/L — SIGNIFICANT CHANGE UP (ref 5–17)
APPEARANCE UR: CLEAR — SIGNIFICANT CHANGE UP
BACTERIA # UR AUTO: NEGATIVE — SIGNIFICANT CHANGE UP
BASOPHILS # BLD AUTO: 0.07 K/UL — SIGNIFICANT CHANGE UP (ref 0–0.2)
BASOPHILS NFR BLD AUTO: 0.6 % — SIGNIFICANT CHANGE UP (ref 0–2)
BILIRUB UR-MCNC: NEGATIVE — SIGNIFICANT CHANGE UP
BUN SERPL-MCNC: 18 MG/DL — SIGNIFICANT CHANGE UP (ref 7–23)
CALCIUM SERPL-MCNC: 9.2 MG/DL — SIGNIFICANT CHANGE UP (ref 8.4–10.5)
CHLORIDE SERPL-SCNC: 107 MMOL/L — SIGNIFICANT CHANGE UP (ref 96–108)
CO2 SERPL-SCNC: 22 MMOL/L — SIGNIFICANT CHANGE UP (ref 22–31)
COLOR SPEC: YELLOW — SIGNIFICANT CHANGE UP
CREAT SERPL-MCNC: 1.13 MG/DL — SIGNIFICANT CHANGE UP (ref 0.5–1.3)
DIFF PNL FLD: NEGATIVE — SIGNIFICANT CHANGE UP
EGFR: 70 ML/MIN/1.73M2 — SIGNIFICANT CHANGE UP
EOSINOPHIL # BLD AUTO: 0.08 K/UL — SIGNIFICANT CHANGE UP (ref 0–0.5)
EOSINOPHIL NFR BLD AUTO: 0.7 % — SIGNIFICANT CHANGE UP (ref 0–6)
EPI CELLS # UR: 1 /HPF — SIGNIFICANT CHANGE UP
GLUCOSE SERPL-MCNC: 131 MG/DL — HIGH (ref 70–99)
GLUCOSE UR QL: NEGATIVE — SIGNIFICANT CHANGE UP
HCT VFR BLD CALC: 40.8 % — SIGNIFICANT CHANGE UP (ref 39–50)
HGB BLD-MCNC: 14 G/DL — SIGNIFICANT CHANGE UP (ref 13–17)
IMM GRANULOCYTES NFR BLD AUTO: 0.4 % — SIGNIFICANT CHANGE UP (ref 0–1.5)
KETONES UR-MCNC: NEGATIVE — SIGNIFICANT CHANGE UP
LEUKOCYTE ESTERASE UR-ACNC: NEGATIVE — SIGNIFICANT CHANGE UP
LYMPHOCYTES # BLD AUTO: 1.31 K/UL — SIGNIFICANT CHANGE UP (ref 1–3.3)
LYMPHOCYTES # BLD AUTO: 11.8 % — LOW (ref 13–44)
MCHC RBC-ENTMCNC: 30.8 PG — SIGNIFICANT CHANGE UP (ref 27–34)
MCHC RBC-ENTMCNC: 34.3 GM/DL — SIGNIFICANT CHANGE UP (ref 32–36)
MCV RBC AUTO: 89.7 FL — SIGNIFICANT CHANGE UP (ref 80–100)
MONOCYTES # BLD AUTO: 0.93 K/UL — HIGH (ref 0–0.9)
MONOCYTES NFR BLD AUTO: 8.3 % — SIGNIFICANT CHANGE UP (ref 2–14)
NEUTROPHILS # BLD AUTO: 8.71 K/UL — HIGH (ref 1.8–7.4)
NEUTROPHILS NFR BLD AUTO: 78.2 % — HIGH (ref 43–77)
NITRITE UR-MCNC: NEGATIVE — SIGNIFICANT CHANGE UP
NRBC # BLD: 0 /100 WBCS — SIGNIFICANT CHANGE UP (ref 0–0)
PH UR: 6.5 — SIGNIFICANT CHANGE UP (ref 5–8)
PLATELET # BLD AUTO: 177 K/UL — SIGNIFICANT CHANGE UP (ref 150–400)
POTASSIUM SERPL-MCNC: 3.6 MMOL/L — SIGNIFICANT CHANGE UP (ref 3.5–5.3)
POTASSIUM SERPL-SCNC: 3.6 MMOL/L — SIGNIFICANT CHANGE UP (ref 3.5–5.3)
PROT UR-MCNC: ABNORMAL
RBC # BLD: 4.55 M/UL — SIGNIFICANT CHANGE UP (ref 4.2–5.8)
RBC # FLD: 13.1 % — SIGNIFICANT CHANGE UP (ref 10.3–14.5)
RBC CASTS # UR COMP ASSIST: 2 /HPF — SIGNIFICANT CHANGE UP (ref 0–4)
SARS-COV-2 RNA SPEC QL NAA+PROBE: SIGNIFICANT CHANGE UP
SODIUM SERPL-SCNC: 142 MMOL/L — SIGNIFICANT CHANGE UP (ref 135–145)
SP GR SPEC: 1.03 — HIGH (ref 1.01–1.02)
UROBILINOGEN FLD QL: NEGATIVE — SIGNIFICANT CHANGE UP
WBC # BLD: 11.14 K/UL — HIGH (ref 3.8–10.5)
WBC # FLD AUTO: 11.14 K/UL — HIGH (ref 3.8–10.5)
WBC UR QL: 1 /HPF — SIGNIFICANT CHANGE UP (ref 0–5)

## 2022-05-25 PROCEDURE — 99233 SBSQ HOSP IP/OBS HIGH 50: CPT

## 2022-05-25 PROCEDURE — 71045 X-RAY EXAM CHEST 1 VIEW: CPT | Mod: 26

## 2022-05-25 RX ORDER — ACETAMINOPHEN 500 MG
650 TABLET ORAL EVERY 6 HOURS
Refills: 0 | Status: DISCONTINUED | OUTPATIENT
Start: 2022-05-25 | End: 2022-06-02

## 2022-05-25 RX ORDER — ACETAMINOPHEN 500 MG
1000 TABLET ORAL ONCE
Refills: 0 | Status: COMPLETED | OUTPATIENT
Start: 2022-05-25 | End: 2022-05-25

## 2022-05-25 RX ADMIN — CLOPIDOGREL BISULFATE 75 MILLIGRAM(S): 75 TABLET, FILM COATED ORAL at 11:39

## 2022-05-25 RX ADMIN — Medication 100 MILLIGRAM(S): at 11:39

## 2022-05-25 RX ADMIN — Medication 81 MILLIGRAM(S): at 11:39

## 2022-05-25 RX ADMIN — Medication 1000 MILLIGRAM(S): at 02:30

## 2022-05-25 RX ADMIN — LATANOPROST 1 DROP(S): 0.05 SOLUTION/ DROPS OPHTHALMIC; TOPICAL at 21:28

## 2022-05-25 RX ADMIN — Medication 400 MILLIGRAM(S): at 01:58

## 2022-05-25 RX ADMIN — ATORVASTATIN CALCIUM 80 MILLIGRAM(S): 80 TABLET, FILM COATED ORAL at 21:27

## 2022-05-25 RX ADMIN — Medication 1 MILLIGRAM(S): at 11:39

## 2022-05-25 RX ADMIN — ENOXAPARIN SODIUM 40 MILLIGRAM(S): 100 INJECTION SUBCUTANEOUS at 21:27

## 2022-05-25 RX ADMIN — Medication 650 MILLIGRAM(S): at 22:14

## 2022-05-25 RX ADMIN — Medication 1 TABLET(S): at 11:39

## 2022-05-25 NOTE — DIETITIAN INITIAL EVALUATION ADULT - REASON
Nutrition focused physical exam deferred at this time per pt preference. No overt fat or muscle losses noted visually.

## 2022-05-25 NOTE — PROGRESS NOTE ADULT - ATTENDING COMMENTS
NOVA shows decreased flow through left MCA territory. Angio shows severe focal stenosis just proximal to the ICA terminus. 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 After discussion with stroke neurology team, will treat medically and reserve intracranial stenting for worsening despite max medical management.

## 2022-05-25 NOTE — DIETITIAN INITIAL EVALUATION ADULT - ADD RECOMMEND
Continue micronutrient supplementation as ordered (CIWA protocol). Provide encouragement with PO intake, menu selections, and assistance with meals as needed. Continue to monitor nutritional intake, labs, weights, BM, skin, clinical course.

## 2022-05-25 NOTE — PROGRESS NOTE ADULT - ASSESSMENT
67 year old left-handed male with medical history of macular degeneration (no vision loss) and EtOH use (daily 6-7 beers/day, last drink 2 days ago) who presents as a transfer from [facility] for tertiary stroke treatment.  Family at bedside to corroborate story.  Patient flew back from [place] Saturday (5/21) morning and took a cab home.  He said he "didn't feel good".  Later in the day, he was speaking one word sentences.  This morning when he woke up, family noted that he could not complete a full sentence.  Initial CTH 5/22/2022 with subacute left MCA territory infarct in the frontal lobe.  CTA with severe focal narrowing of left supraclinoid ICA extending to the carotid terminus, as well as the diminished opacification of the left anterior cerebral artery.  CTP with large penumbra in left MCA territory with mismatch volume of 12.7cc.  Patient transferred to [facility].  Stroke score of 1 for mild non-fluent aphasia.    Impression.  Mild-moderate or moderate transcortical motor aphasia, due to left hemispheric border zone infarction, with left supraclinoid ICA and M1 stenosis.  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 For now, will treat for symptomatic intracranial atherosclerosis.    NEURO: Continue close monitoring for neurologic deterioration, permissive HTN to gradual normotension, titrate statin to LDL goal less than 70, MRI Brain w/o, MRA Head and Neck w/o results as noted above. Physical therapy/OT recommending acute rehab.     ANTITHROMBOTIC THERAPY: Aspirin and Plavix for 3 months.    PULMONARY: Protecting airway, saturating well     CARDIOVASCULAR: TTE shows EF of 65%, Normal left ventricular systolic function. No segmental wall motion abnormalities, continue with cardiac monitoring.                              SBP goal: Permissive HTN to gradual normotension    GASTROINTESTINAL:  dysphagia screen passed, tolerating diet      Diet: Regular     RENAL: BUN/Cr without acute change, good urine output      Na Goal: Greater than 135     Newman: No    HEMATOLOGY: H/H without acute change, Platelets 171, no signs of bleeding.      DVT ppx: Heparin s.c [] LMWH [x]     ID: afebrile, no leukocytosis, no signs or symptoms of infection.     OTHER: All questions and concerns addressed with patient and family at bedside.     DISPOSITION: Rehab once stable and workup is complete.    CORE MEASURES:        Admission NIHSS: 1     TPA: [] YES [x] NO      LDL/HDL: 140/48     Depression Screen: p     Statin Therapy: y     Dysphagia Screen: [x] PASS [] FAIL     Smoking [] YES [x] NO      Afib [] YES [x] NO     Stroke Education [x] YES [] NO    Obtain screening lower extremity venous ultrasound in patients who meet 1 or more of the following criteria as patient is high risk for DVT/PE on admission:   [] History of DVT/PE  []Hypercoagulable states (Factor V Leiden, Cancer, OCP, etc. )  [x]Prolonged immobility (hemiplegia/hemiparesis/post operative or any other extended immobilization)- flight   [] Transferred from outside facility (Rehab or Long term care)  [] Age </= to 50.   67 year old left-handed male with medical history of macular degeneration (no vision loss) and EtOH use (daily 6-7 beers/day, last drink 2 days ago) who presents as a transfer from [facility] for tertiary stroke treatment.  Family at bedside to corroborate story.  Patient flew back from [place] Saturday (5/21) morning and took a cab home.  He said he "didn't feel good".  Later in the day, he was speaking one word sentences.  This morning when he woke up, family noted that he could not complete a full sentence.  Initial CTH 5/22/2022 with subacute left MCA territory infarct in the frontal lobe.  CTA with severe focal narrowing of left supraclinoid ICA extending to the carotid terminus, as well as the diminished opacification of the left anterior cerebral artery.  CTP with large penumbra in left MCA territory with mismatch volume of 12.7cc.  Patient transferred to [facility].  Stroke score of 1 for mild non-fluent aphasia.    Impression.  Mild-moderate or moderate transcortical motor aphasia, due to left hemispheric border zone infarction, with left supraclinoid ICA and M1 stenosis.  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 For now, will treat for symptomatic intracranial atherosclerosis.    NEURO: Continue close monitoring for neurologic deterioration, permissive HTN to gradual normotension, titrate statin to LDL goal less than 70, MRI Brain w/o, MRA Head and Neck w/o results as noted above. Physical therapy/OT recommending acute rehab.     ANTITHROMBOTIC THERAPY: Aspirin and Plavix for 3 months.     PULMONARY: Protecting airway, saturating well     CARDIOVASCULAR: TTE shows EF of 65%, Normal left ventricular systolic function. No segmental wall motion abnormalities, continue with cardiac monitoring.                              SBP goal: Permissive HTN to gradual normotension    GASTROINTESTINAL:  dysphagia screen passed, tolerating diet      Diet: Regular     RENAL: BUN/Cr without acute change, good urine output      Na Goal: Greater than 135     Newman: No    HEMATOLOGY: H/H without acute change, Platelets 171, no signs of bleeding.      DVT ppx: Heparin s.c [] LMWH [x]     ID: afebrile, no leukocytosis, no signs or symptoms of infection.     OTHER: All questions and concerns addressed with patient and family at bedside.     DISPOSITION: Rehab once stable and workup is complete.    CORE MEASURES:        Admission NIHSS: 1     TPA: [] YES [x] NO      LDL/HDL: 140/48     Depression Screen: p     Statin Therapy: y     Dysphagia Screen: [x] PASS [] FAIL     Smoking [] YES [x] NO      Afib [] YES [x] NO     Stroke Education [x] YES [] NO    Obtain screening lower extremity venous ultrasound in patients who meet 1 or more of the following criteria as patient is high risk for DVT/PE on admission:   [] History of DVT/PE  []Hypercoagulable states (Factor V Leiden, Cancer, OCP, etc. )  [x]Prolonged immobility (hemiplegia/hemiparesis/post operative or any other extended immobilization)- flight   [] Transferred from outside facility (Rehab or Long term care)  [] Age </= to 50.   67 year old left-handed male with medical history of macular degeneration (no vision loss) and EtOH use (daily 6-7 beers/day, last drink 2 days ago) who presents as a transfer from [facility] for tertiary stroke treatment.  Family at bedside to corroborate story.  Patient flew back from [place] Saturday (5/21) morning and took a cab home.  He said he "didn't feel good".  Later in the day, he was speaking one word sentences.  This morning when he woke up, family noted that he could not complete a full sentence.  Initial CTH 5/22/2022 with subacute left MCA territory infarct in the frontal lobe.  CTA with severe focal narrowing of left supraclinoid ICA extending to the carotid terminus, as well as the diminished opacification of the left anterior cerebral artery.  CTP with large penumbra in left MCA territory with mismatch volume of 12.7cc.  Patient transferred to [facility].  Stroke score of 1 for mild non-fluent aphasia.    Impression.  Mild-moderate or moderate transcortical motor aphasia, due to left hemispheric border zone infarction, with left supraclinoid ICA and M1 stenosis.  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     NEURO: Neuro exam unchanged, Continue close monitoring for neurologic deterioration, permissive HTN to gradual normotension, titrate statin to LDL goal less than 70, MRI Brain w/o, MRA Head and Neck w/o results as noted above. Physical therapy/OT recommending acute rehab.     ANTITHROMBOTIC THERAPY: Aspirin and Plavix for 3 months, followed  by ASA only as per Sammpris trial for secondary stroke prevention     PULMONARY: CXR (05/25/) clear, Protecting airway, saturating well     CARDIOVASCULAR: TTE shows EF of 65%, Normal left ventricular systolic function. No segmental wall motion abnormalities, continue with cardiac monitoring.                              SBP goal: Permissive HTN to gradual normotension    GASTROINTESTINAL:  dysphagia screen passed, tolerating diet      Diet: Regular     RENAL: BUN/Cr without acute change, good urine output      Na Goal: Greater than 135     Newman: No    HEMATOLOGY: H/H without acute change, Platelets 177, no signs of bleeding. LE doppler (05/24): No evidence of DVT in either lower extremity.       DVT ppx: LMWH      ID: afebrile, Tmax 37.8, leukocytosis, will continue to monitor     OTHER: All questions and concerns addressed with patient and family at bedside. Alcohol abuse counseling provided     DISPOSITION: Acute Rehab as per PT/OT eval once stable and workup is complete.    CORE MEASURES:        Admission NIHSS: 1     TPA: [] YES [x] NO      LDL/HDL: 140/48     Depression Screen: p     Statin Therapy: y     Dysphagia Screen: [x] PASS [] FAIL     Smoking [] YES [x] NO      Afib [] YES [x] NO     Stroke Education [x] YES [] NO    Obtain screening lower extremity venous ultrasound in patients who meet 1 or more of the following criteria as patient is high risk for DVT/PE on admission:   [] History of DVT/PE  []Hypercoagulable states (Factor V Leiden, Cancer, OCP, etc. )  [x]Prolonged immobility (hemiplegia/hemiparesis/post operative or any other extended immobilization)- flight   [] Transferred from outside facility (Rehab or Long term care)  [] Age </= to 50.   67 year old left-handed male with medical history of macular degeneration (no vision loss) and EtOH use (daily 6-7 beers/day, last drink 2 days ago) who presents as a transfer from [facility] for tertiary stroke treatment.  Family at bedside to corroborate story.  Patient flew back from [place] Saturday (5/21) morning and took a cab home.  He said he "didn't feel good".  Later in the day, he was speaking one word sentences.  This morning when he woke up, family noted that he could not complete a full sentence.  Initial CTH 5/22/2022 with subacute left MCA territory infarct in the frontal lobe.  CTA with severe focal narrowing of left supraclinoid ICA extending to the carotid terminus, as well as the diminished opacification of the left anterior cerebral artery.  CTP with large penumbra in left MCA territory with mismatch volume of 12.7cc.  Patient transferred to [facility].  Stroke score of 1 for mild non-fluent aphasia.    Impression.  Mild-moderate or moderate transcortical motor aphasia, due to left hemispheric border zone infarction, with left supraclinoid ICA and M1 stenosis.  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     NEURO: Neuro exam unchanged, Continue close monitoring for neurologic deterioration, permissive HTN to gradual normotension, titrate statin to LDL goal less than 70, s/p cerebral angiogram on 05/23/22: shows a left ICA stenosis.  MRI Brain w/o, MRA Head and Neck w/o results as noted above. Physical therapy/OT recommending acute rehab.     ANTITHROMBOTIC THERAPY: Aspirin and Plavix for 3 months, followed  by ASA only as per Sammpris trial for secondary stroke prevention     PULMONARY: CXR (05/25/) clear, Protecting airway, saturating well     CARDIOVASCULAR: TTE shows EF of 65%, Normal left ventricular systolic function. No segmental wall motion abnormalities, continue with cardiac monitoring.                              SBP goal: Permissive HTN to gradual normotension    GASTROINTESTINAL:  dysphagia screen passed, tolerating diet      Diet: Regular     RENAL: BUN/Cr without acute change, good urine output      Na Goal: Greater than 135     Newman: No    HEMATOLOGY: H/H without acute change, Platelets 177, no signs of bleeding. LE doppler (05/24): No evidence of DVT in either lower extremity.       DVT ppx: LMWH      ID: afebrile, Tmax 37.8, leukocytosis, will continue to monitor     OTHER: All questions and concerns addressed with patient and family at bedside. Alcohol abuse counseling provided     DISPOSITION: Acute Rehab as per PT/OT eval once stable and workup is complete.    CORE MEASURES:        Admission NIHSS: 1     TPA: [] YES [x] NO      LDL/HDL: 140/48     Depression Screen: p     Statin Therapy: y     Dysphagia Screen: [x] PASS [] FAIL     Smoking [] YES [x] NO      Afib [] YES [x] NO     Stroke Education [x] YES [] NO    Obtain screening lower extremity venous ultrasound in patients who meet 1 or more of the following criteria as patient is high risk for DVT/PE on admission:   [] History of DVT/PE  []Hypercoagulable states (Factor V Leiden, Cancer, OCP, etc. )  [x]Prolonged immobility (hemiplegia/hemiparesis/post operative or any other extended immobilization)- flight   [] Transferred from outside facility (Rehab or Long term care)  [] Age </= to 50.   67 year old left-handed male with medical history of macular degeneration (no vision loss) and EtOH use (daily 6-7 beers/day, last drink 2 days ago) who presents as a transfer from [facility] for tertiary stroke treatment.  Family at bedside to corroborate story.  Patient flew back from [place] Saturday (5/21) morning and took a cab home.  He said he "didn't feel good".  Later in the day, he was speaking one word sentences.  This morning when he woke up, family noted that he could not complete a full sentence.  Initial CTH 5/22/2022 with subacute left MCA territory infarct in the frontal lobe.  CTA with severe focal narrowing of left supraclinoid ICA extending to the carotid terminus, as well as the diminished opacification of the left anterior cerebral artery.  CTP with large penumbra in left MCA territory with mismatch volume of 12.7cc.  Patient transferred to [facility].  Stroke score of 1 for mild non-fluent aphasia.    Impression.  Mild-moderate or moderate transcortical motor aphasia, due to left hemispheric border zone infarction, with left supraclinoid ICA and M1 stenosis.  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     NEURO: Neuro exam unchanged, Continue close monitoring for neurologic deterioration, permissive HTN to gradual normotension, titrate statin to LDL goal less than 70, s/p cerebral angiogram on 05/23/22: shows a left ICA stenosis.  MRI Brain w/o, MRA Head and Neck w/o results as noted above. Physical therapy/OT recommending acute rehab.     ANTITHROMBOTIC THERAPY: Aspirin and Plavix for 3 months, followed  by ASA only as per Sammpris trial for secondary stroke prevention     PULMONARY: CXR (05/25/) clear, Protecting airway, saturating well     CARDIOVASCULAR: TTE shows EF of 65%, Normal left ventricular systolic function. No segmental wall motion abnormalities, continue with cardiac monitoring.                              SBP goal: Permissive HTN to gradual normotension    GASTROINTESTINAL:  dysphagia screen passed, tolerating diet      Diet: Regular     RENAL: BUN/Cr without acute change, good urine output      Na Goal: Greater than 135     Newman: No    HEMATOLOGY: H/H without acute change, Platelets 177, no signs of bleeding. LE doppler (05/24): No evidence of DVT in either lower extremity.       DVT ppx: LMWH      ID: afebrile, Tmax 37.8, leukocytosis, will continue to monitor     OTHER: All questions and concerns addressed with patient and family at bedside. Alcohol abuse counseling provided, will continue CIWA protocol     DISPOSITION: Acute Rehab as per PT/OT eval once stable and workup is complete.    CORE MEASURES:        Admission NIHSS: 1     TPA: [] YES [x] NO      LDL/HDL: 140/48     Depression Screen: p     Statin Therapy: y     Dysphagia Screen: [x] PASS [] FAIL     Smoking [] YES [x] NO      Afib [] YES [x] NO     Stroke Education [x] YES [] NO    Obtain screening lower extremity venous ultrasound in patients who meet 1 or more of the following criteria as patient is high risk for DVT/PE on admission:   [] History of DVT/PE  []Hypercoagulable states (Factor V Leiden, Cancer, OCP, etc. )  [x]Prolonged immobility (hemiplegia/hemiparesis/post operative or any other extended immobilization)- flight   [] Transferred from outside facility (Rehab or Long term care)  [] Age </= to 50.

## 2022-05-25 NOTE — DIETITIAN INITIAL EVALUATION ADULT - PERTINENT MEDS FT
MEDICATIONS  (STANDING):  aspirin  chewable 81 milliGRAM(s) Oral daily  atorvastatin 80 milliGRAM(s) Oral at bedtime  clopidogrel Tablet 75 milliGRAM(s) Oral daily  enoxaparin Injectable 40 milliGRAM(s) SubCutaneous every 24 hours  folic acid 1 milliGRAM(s) Oral daily  latanoprost 0.005% Ophthalmic Solution 1 Drop(s) Both EYES at bedtime  multivitamin 1 Tablet(s) Oral daily  thiamine 100 milliGRAM(s) Oral daily    MEDICATIONS  (PRN):

## 2022-05-25 NOTE — PROGRESS NOTE ADULT - SUBJECTIVE AND OBJECTIVE BOX
Patient seen and examined at bedside.    --Anticoagulation--  aspirin  chewable 81 milliGRAM(s) Oral daily  clopidogrel Tablet 75 milliGRAM(s) Oral daily  enoxaparin Injectable 40 milliGRAM(s) SubCutaneous every 24 hours    T(C): 37.8 (05-25-22 @ 00:05), Max: 37.8 (05-25-22 @ 00:00)  HR: 66 (05-25-22 @ 04:00) (55 - 89)  BP: 148/80 (05-25-22 @ 04:00) (107/76 - 169/81)  RR: 19 (05-25-22 @ 04:00) (13 - 23)  SpO2: 94% (05-25-22 @ 04:00) (94% - 98%)  Wt(kg): --    Exam: AOx3, EOMI, no facial, no drift, SARAVIA 5/5

## 2022-05-25 NOTE — PROGRESS NOTE ADULT - ASSESSMENT
67M adm stroke neuro. Starting having some difficulty speaking yesterday, worse this AM. Speaks in short sentences and has wfd. CTH showing L MCA infarct in frontal lobe. CTA/CTP showing L MCA territory penumbra (mismatch volume 12.7); dec contrast of L ICA w/ severe narrowing of L supraclinoid ICA ext to carotid term; dec contrast of L YVAN and dec caliber/filling of L MCA branches. Exam: some wfd otherwsise AOx3, FC, PERRL, EOMI, no facial, 5/5 throughout, no drift.  -Adm stroke neuro  -MRI/A shows decreased Left YVAN and MCA flow w/ increased Left PCA flow suggesting chronicity, Left MCA infarct  -s/p Angio showing severe Left ICA stenosis  -Preop for possible Angio/stent tomorrow 67M adm stroke neuro. Starting having some difficulty speaking yesterday, worse this AM. Speaks in short sentences and has wfd. CTH showing L MCA infarct in frontal lobe. CTA/CTP showing L MCA territory penumbra (mismatch volume 12.7); dec contrast of L ICA w/ severe narrowing of L supraclinoid ICA ext to carotid term; dec contrast of L YVAN and dec caliber/filling of L MCA branches. Exam: some wfd otherwsise AOx3, FC, PERRL, EOMI, no facial, 5/5 throughout, no drift.  -Adm stroke neuro  -MRI/A shows decreased Left YVAN and MCA flow w/ increased Left PCA flow suggesting chronicity, Left MCA infarct  -s/p Angio showing severe Left ICA stenosis  -No more acute neurosurgical intervention, patient can f/u with Dr. Medina within 1-2 weeks after d/c

## 2022-05-25 NOTE — PROGRESS NOTE ADULT - SUBJECTIVE AND OBJECTIVE BOX
THE PATIENT WAS SEEN AND EXAMINED BY ME WITH THE HOUSESTAFF AND STROKE TEAM DURING MORNING ROUNDS.   HPI:  67 year old left-handed male with medical history of macular degeneration (no vision loss) and EtOH use (daily 6-7 beers/day, last drink 2 days ago) who presents as a transfer from [facility] for tertiary stroke treatment.  Family at bedside to corroborate story.  Patient flew back from [place] Saturday (5/21) morning and took a cab home.  He said he "didn't feel good".  Later in the day, he was speaking one word sentences.  On morning of admission when he woke up, family noted that he could not complete a full sentence.  Initial CTH 5/22/2022 with subacute left MCA territory infarct in the frontal lobe.  CTA with severe focal narrowing of left supraclinoid ICA extending to the carotid terminus, as well as the diminished opacification of the left anterior cerebral artery.  CTP with large penumbra in left MCA territory with mismatch volume of 12.7cc.  Patient transferred to [facility].  Stroke score of 1 for mild non-fluent aphasia. NIHSS 1 preMRS 0     SUBJECTIVE: No events overnight.  No new neurologic complaints, ROS reported negative unless otherwise noted.      aspirin  chewable 81 milliGRAM(s) Oral daily  atorvastatin 80 milliGRAM(s) Oral at bedtime  clopidogrel Tablet 75 milliGRAM(s) Oral daily  enoxaparin Injectable 40 milliGRAM(s) SubCutaneous every 24 hours  folic acid 1 milliGRAM(s) Oral daily  latanoprost 0.005% Ophthalmic Solution 1 Drop(s) Both EYES at bedtime  multivitamin 1 Tablet(s) Oral daily  thiamine 100 milliGRAM(s) Oral daily      PHYSICAL EXAM:   Vital Signs Last 24 Hrs  T(C): 37.2 (25 May 2022 08:00), Max: 37.8 (25 May 2022 00:00)  T(F): 99 (25 May 2022 08:00), Max: 100.1 (25 May 2022 00:05)  HR: 81 (25 May 2022 08:00) (66 - 89)  BP: 166/97 (25 May 2022 08:00) (107/76 - 169/81)  BP(mean): 115 (25 May 2022 08:00) (75 - 115)  RR: 18 (25 May 2022 08:00) (14 - 23)  SpO2: 97% (25 May 2022 08:00) (94% - 97%)    General: No acute distress  HEENT: EOM intact, BTT less on R  Abdomen: Soft, nontender, nondistended   Extremities: No edema    NEUROLOGICAL EXAM:  Mental status: Eyes open, awake, alert, oriented to self, date and hospital, speech fluent, moderate dysarthria, follows cross commands with some hesitancy   Cranial Nerves: No facial asymmetry, no nystagmus, moderate dysarthria,  tongue midline  Motor exam: Normal tone, no drift, RUE 5/5, RLE 5/5, LUE 5/5, LLE 5/5 , normal left fine finger movements, slightly clumsy on right.  Sensation: Intact to light touch   Coordination/ Gait: No dysmetria, gait deferred      LABS:                        14.0   11.14 )-----------( 177      ( 25 May 2022 01:36 )             40.8    05-25    142  |  107  |  18  ----------------------------<  131<H>  3.6   |  22  |  1.13    Ca    9.2      25 May 2022 01:36          IMAGING: Reviewed by me.     MR Head/MRA Head and Neck (5/23/22): Acute left middle cerebral artery infarct similar to that predicted on the CT perfusion. No hemorrhage. Marked narrowing of the left supraclinoid internal carotid artery with diminished antegrade flow in the left anterior and middle cerebral arteries. No significant cross-filling from the right anterior communicating artery or the left posterior communicating artery. Markedly elevated flow in the left posterior cerebral artery suggesting chronicity with collateralization. Noninvasive flow MR angiography     5/22/22:  CT PERFUSION: Large ischemic penumbra in the left middle cerebral artery territory with mismatch volume of 12.7.    CTA COW:   Diminished opacification of the left internal carotid artery, with focal severe narrowing of the left supraclinoid ICA extending to the carotid terminus, as well as diminished opacification of the left anterior cerebral artery and diminished caliber and opacification of left middle cerebral artery branches, including markedly severe effacement of the left M1 segment. No vessel cut off.    CTA NECK:   Patent, ECAs, ICAs, although diffusely decreased caliber of the left internal carotid artery extending to the skull base, cannot exclude a dissection. Atherosclerotic plaque at both carotid bifurcations with less than 50% stenosis at the ICA origins by NASCET criteria. Bilateral vertebral arteries are patent without flow limiting stenosis.    CTH 5/22/22:  Subacute left middle cerebral artery territory infarct in the frontal lobe. No evidence for hemorrhagic conversion.   THE PATIENT WAS SEEN AND EXAMINED BY ME WITH THE HOUSESTAFF AND STROKE TEAM DURING MORNING ROUNDS.   HPI:  67 year old left-handed male with medical history of macular degeneration (no vision loss) and EtOH use (daily 6-7 beers/day, last drink 2 days ago) who presents as a transfer from [facility] for tertiary stroke treatment.  Family at bedside to corroborate story.  Patient flew back from [place] Saturday (5/21) morning and took a cab home.  He said he "didn't feel good".  Later in the day, he was speaking one word sentences.  On morning of admission when he woke up, family noted that he could not complete a full sentence.  Initial CTH 5/22/2022 with subacute left MCA territory infarct in the frontal lobe.  CTA with severe focal narrowing of left supraclinoid ICA extending to the carotid terminus, as well as the diminished opacification of the left anterior cerebral artery.  CTP with large penumbra in left MCA territory with mismatch volume of 12.7cc.  Patient transferred to [facility].  Stroke score of 1 for mild non-fluent aphasia. NIHSS 1 preMRS 0     SUBJECTIVE: No events overnight.  No new neurologic complaints, ROS reported negative unless otherwise noted.      aspirin  chewable 81 milliGRAM(s) Oral daily  atorvastatin 80 milliGRAM(s) Oral at bedtime  clopidogrel Tablet 75 milliGRAM(s) Oral daily  enoxaparin Injectable 40 milliGRAM(s) SubCutaneous every 24 hours  folic acid 1 milliGRAM(s) Oral daily  latanoprost 0.005% Ophthalmic Solution 1 Drop(s) Both EYES at bedtime  multivitamin 1 Tablet(s) Oral daily  thiamine 100 milliGRAM(s) Oral daily      PHYSICAL EXAM:   Vital Signs Last 24 Hrs  T(C): 37.2 (25 May 2022 08:00), Max: 37.8 (25 May 2022 00:00)  T(F): 99 (25 May 2022 08:00), Max: 100.1 (25 May 2022 00:05)  HR: 81 (25 May 2022 08:00) (66 - 89)  BP: 166/97 (25 May 2022 08:00) (107/76 - 169/81)  BP(mean): 115 (25 May 2022 08:00) (75 - 115)  RR: 18 (25 May 2022 08:00) (14 - 23)  SpO2: 97% (25 May 2022 08:00) (94% - 97%)    General: No acute distress  HEENT: EOM intact, BTT less on R  Abdomen: Soft, nontender, nondistended   Extremities: No edema    NEUROLOGICAL EXAM:  Mental status: Eyes open, awake, alert, oriented to self, date and hospital, speech moderately dysfluent,  follows cross commands with some hesitancy; repetition intact  Cranial Nerves: No facial asymmetry, no nystagmus, mild dysarthria,  tongue midline  Motor exam: Normal tone, no drift, RUE 5/5, RLE 5/5, LUE 5/5, LLE 5/5 , normal left fine finger movements, slightly clumsy on right.  Sensation: Intact to light touch   Coordination/ Gait: No dysmetria, gait deferred      LABS:                        14.0   11.14 )-----------( 177      ( 25 May 2022 01:36 )             40.8    05-25    142  |  107  |  18  ----------------------------<  131<H>  3.6   |  22  |  1.13    Ca    9.2      25 May 2022 01:36          IMAGING: Reviewed by me.     MR Head/MRA Head and Neck (5/23/22): Acute left middle cerebral artery infarct similar to that predicted on the CT perfusion. No hemorrhage. Marked narrowing of the left supraclinoid internal carotid artery with diminished antegrade flow in the left anterior and middle cerebral arteries. No significant cross-filling from the right anterior communicating artery or the left posterior communicating artery. Markedly elevated flow in the left posterior cerebral artery suggesting chronicity with collateralization. Noninvasive flow MR angiography     5/22/22:  CT PERFUSION: Large ischemic penumbra in the left middle cerebral artery territory with mismatch volume of 12.7.    CTA COW:   Diminished opacification of the left internal carotid artery, with focal severe narrowing of the left supraclinoid ICA extending to the carotid terminus, as well as diminished opacification of the left anterior cerebral artery and diminished caliber and opacification of left middle cerebral artery branches, including markedly severe effacement of the left M1 segment. No vessel cut off.    CTA NECK:   Patent, ECAs, ICAs, although diffusely decreased caliber of the left internal carotid artery extending to the skull base, cannot exclude a dissection. Atherosclerotic plaque at both carotid bifurcations with less than 50% stenosis at the ICA origins by NASCET criteria. Bilateral vertebral arteries are patent without flow limiting stenosis.    CTH 5/22/22:  Subacute left middle cerebral artery territory infarct in the frontal lobe. No evidence for hemorrhagic conversion.

## 2022-05-25 NOTE — DIETITIAN INITIAL EVALUATION ADULT - PERTINENT LABORATORY DATA
05-25    142  |  107  |  18  ----------------------------<  131<H>  3.6   |  22  |  1.13    Ca    9.2      25 May 2022 01:36    A1C with Estimated Average Glucose Result: 5.3 % (05-22-22 @ 17:29)

## 2022-05-25 NOTE — DIETITIAN INITIAL EVALUATION ADULT - EDUCATION DIETARY MODIFICATIONS
Discussed importance of adequate protein energy intake. Reviewed lean sources of protein, encouraged limited intake of red meat as feasible. Encouraged avoidance of foods high in saturated fats./(1) partially meets; needs review/practice/verbalization

## 2022-05-25 NOTE — DIETITIAN INITIAL EVALUATION ADULT - ORAL INTAKE PTA/DIET HISTORY
Limited information obtained at this time, pt with limited interest as well as expressive aphasia. Reports eating 2 meals per day PTA - lists foods such as chicken and rice, steak, but cannot detail further. Of note, per H&P pt with hx of EtOH abuse "6-7 beers/day."

## 2022-05-26 ENCOUNTER — APPOINTMENT (OUTPATIENT)
Dept: NEUROSURGERY | Facility: HOSPITAL | Age: 67
End: 2022-05-26

## 2022-05-26 LAB
ANION GAP SERPL CALC-SCNC: 14 MMOL/L — SIGNIFICANT CHANGE UP (ref 5–17)
BUN SERPL-MCNC: 11 MG/DL — SIGNIFICANT CHANGE UP (ref 7–23)
CALCIUM SERPL-MCNC: 9.4 MG/DL — SIGNIFICANT CHANGE UP (ref 8.4–10.5)
CHLORIDE SERPL-SCNC: 104 MMOL/L — SIGNIFICANT CHANGE UP (ref 96–108)
CO2 SERPL-SCNC: 23 MMOL/L — SIGNIFICANT CHANGE UP (ref 22–31)
CREAT SERPL-MCNC: 0.92 MG/DL — SIGNIFICANT CHANGE UP (ref 0.5–1.3)
CULTURE RESULTS: SIGNIFICANT CHANGE UP
EGFR: 90 ML/MIN/1.73M2 — SIGNIFICANT CHANGE UP
GLUCOSE SERPL-MCNC: 118 MG/DL — HIGH (ref 70–99)
HCT VFR BLD CALC: 41.7 % — SIGNIFICANT CHANGE UP (ref 39–50)
HGB BLD-MCNC: 14.6 G/DL — SIGNIFICANT CHANGE UP (ref 13–17)
MCHC RBC-ENTMCNC: 31.3 PG — SIGNIFICANT CHANGE UP (ref 27–34)
MCHC RBC-ENTMCNC: 35 GM/DL — SIGNIFICANT CHANGE UP (ref 32–36)
MCV RBC AUTO: 89.5 FL — SIGNIFICANT CHANGE UP (ref 80–100)
NRBC # BLD: 0 /100 WBCS — SIGNIFICANT CHANGE UP (ref 0–0)
PA ADP PRP-ACNC: 192 PRU — LOW (ref 194–417)
PA ADP PRP-ACNC: 89 PRU — LOW (ref 194–417)
PLATELET # BLD AUTO: 185 K/UL — SIGNIFICANT CHANGE UP (ref 150–400)
PLATELET RESPONSE ASPIRIN RESULT: 473 ARU — SIGNIFICANT CHANGE UP (ref 350–700)
PLATELET RESPONSE ASPIRIN RESULT: 503 ARU — SIGNIFICANT CHANGE UP (ref 350–700)
POTASSIUM SERPL-MCNC: 3.4 MMOL/L — LOW (ref 3.5–5.3)
POTASSIUM SERPL-SCNC: 3.4 MMOL/L — LOW (ref 3.5–5.3)
RBC # BLD: 4.66 M/UL — SIGNIFICANT CHANGE UP (ref 4.2–5.8)
RBC # FLD: 12.8 % — SIGNIFICANT CHANGE UP (ref 10.3–14.5)
SODIUM SERPL-SCNC: 141 MMOL/L — SIGNIFICANT CHANGE UP (ref 135–145)
SPECIMEN SOURCE: SIGNIFICANT CHANGE UP
WBC # BLD: 11.69 K/UL — HIGH (ref 3.8–10.5)
WBC # FLD AUTO: 11.69 K/UL — HIGH (ref 3.8–10.5)

## 2022-05-26 PROCEDURE — 61635 INTRACRAN ANGIOPLSTY W/STENT: CPT

## 2022-05-26 PROCEDURE — 70551 MRI BRAIN STEM W/O DYE: CPT | Mod: 26

## 2022-05-26 PROCEDURE — 70450 CT HEAD/BRAIN W/O DYE: CPT | Mod: 26

## 2022-05-26 PROCEDURE — 99291 CRITICAL CARE FIRST HOUR: CPT

## 2022-05-26 PROCEDURE — 76377 3D RENDER W/INTRP POSTPROCES: CPT | Mod: 26

## 2022-05-26 RX ORDER — NICARDIPINE HYDROCHLORIDE 30 MG/1
5 CAPSULE, EXTENDED RELEASE ORAL
Qty: 40 | Refills: 0 | Status: DISCONTINUED | OUTPATIENT
Start: 2022-05-26 | End: 2022-05-27

## 2022-05-26 RX ORDER — POTASSIUM CHLORIDE 20 MEQ
20 PACKET (EA) ORAL ONCE
Refills: 0 | Status: COMPLETED | OUTPATIENT
Start: 2022-05-26 | End: 2022-05-26

## 2022-05-26 RX ORDER — CANGRELOR 50 MG/1
2 INJECTION, POWDER, LYOPHILIZED, FOR SOLUTION INTRAVENOUS
Qty: 50 | Refills: 0 | Status: DISCONTINUED | OUTPATIENT
Start: 2022-05-26 | End: 2022-05-28

## 2022-05-26 RX ORDER — ASPIRIN/CALCIUM CARB/MAGNESIUM 324 MG
325 TABLET ORAL DAILY
Refills: 0 | Status: ACTIVE | OUTPATIENT
Start: 2022-05-26 | End: 2023-04-24

## 2022-05-26 RX ADMIN — ATORVASTATIN CALCIUM 80 MILLIGRAM(S): 80 TABLET, FILM COATED ORAL at 22:01

## 2022-05-26 RX ADMIN — CANGRELOR 54.4 MICROGRAM(S)/KG/MIN: 50 INJECTION, POWDER, LYOPHILIZED, FOR SOLUTION INTRAVENOUS at 23:49

## 2022-05-26 RX ADMIN — LATANOPROST 1 DROP(S): 0.05 SOLUTION/ DROPS OPHTHALMIC; TOPICAL at 22:00

## 2022-05-26 RX ADMIN — CANGRELOR 54.4 MICROGRAM(S)/KG/MIN: 50 INJECTION, POWDER, LYOPHILIZED, FOR SOLUTION INTRAVENOUS at 13:27

## 2022-05-26 RX ADMIN — Medication 1 MILLIGRAM(S): at 15:22

## 2022-05-26 RX ADMIN — Medication 20 MILLIEQUIVALENT(S): at 14:17

## 2022-05-26 RX ADMIN — Medication 650 MILLIGRAM(S): at 00:00

## 2022-05-26 RX ADMIN — NICARDIPINE HYDROCHLORIDE 25 MG/HR: 30 CAPSULE, EXTENDED RELEASE ORAL at 13:29

## 2022-05-26 RX ADMIN — Medication 1 TABLET(S): at 14:17

## 2022-05-26 NOTE — CONSULT NOTE ADULT - SUBJECTIVE AND OBJECTIVE BOX
HPI:   Patient is a 67y male who developed word finding difficulty a few days ago hence presented to hospital and found to have left mca infarct. He had selective cerebral angiogram and found to have left carotid artery stenosis. Today he underwent stenting to the artery. He has had some low grade fevers over the past couple of days but denies any pain, n,v,d,dysuria, sob, cp, ha, back pain, cough, abdomen pain. He just returned from 5 months trip to [facility]. He felt fine while there but began to feel poorly on his flight home which was the day before admission. He had no febrile illnesses while there. He has not had any toothaches or dental procedures    REVIEW OF SYSTEMS:  All other review of systems negative (Comprehensive ROS)    PAST MEDICAL & SURGICAL HISTORY:  Glaucoma      Osteoarthritis      Knee pain, left  s/p TKR 8/15      Elevated liver enzymes  s/p tylenol use after TKR      S/P knee surgery  left knee at age 15      S/P inguinal hernia repair  right      Status post total left knee replacement  2015      S/P colonoscopy with polypectomy  benign, 2 years ago          Allergies    No Known Allergies    Intolerances        Antimicrobials Day #  :    Other Medications:  acetaminophen     Tablet .. 650 milliGRAM(s) Oral every 6 hours PRN  aspirin 325 milliGRAM(s) Oral daily  atorvastatin 80 milliGRAM(s) Oral at bedtime  cangrelor Infusion 2 MICROgram(s)/kG/Min IV Continuous <Continuous>  clopidogrel Tablet 75 milliGRAM(s) Oral daily  enoxaparin Injectable 40 milliGRAM(s) SubCutaneous every 24 hours  folic acid 1 milliGRAM(s) Oral daily  latanoprost 0.005% Ophthalmic Solution 1 Drop(s) Both EYES at bedtime  multivitamin 1 Tablet(s) Oral daily  niCARdipine Infusion 5 mG/Hr IV Continuous <Continuous>      FAMILY HISTORY:  Family history of brain cancer (Mother)    Family history of heart disease (Father)  cancer?        SOCIAL HISTORY:  Smoking: [ ]Yes [ x]No  ETOH: [x ]Yes [ ]No  Drug Use: [ ]Yes [ x]No   [ ]x Single[ ]    T(F): 97.3 (22 @ 12:10), Max: 100.4 (22 @ 21:30)  HR: 68 (22 @ 15:45)  BP: 130/65 (05-26-22 @ 15:30)  RR: 17 (22 @ 15:45)  SpO2: 94% (22 @ 15:45)  Wt(kg): --    PHYSICAL EXAM:  General: alert, no acute distress  Eyes:  anicteric, no conjunctival injection, no discharge  Oropharynx: no lesions or injection 	  Neck: supple, without adenopathy  Lungs: clear to auscultation  Heart: regular rate and rhythm; no murmur, rubs or gallops  Abdomen: soft, nondistended, nontender, without mass or organomegaly  Skin: no lesions  Extremities: no clubbing, cyanosis, or edema  Neurologic: alert, oriented, moves all extremities    LAB RESULTS:                        14.6   11.69 )-----------( 185      ( 26 May 2022 05:43 )             41.7         141  |  104  |  11  ----------------------------<  118<H>  3.4<L>   |  23  |  0.92    Ca    9.4      26 May 2022 05:43        Urinalysis Basic - ( 25 May 2022 02:10 )    Color: Yellow / Appearance: Clear / S.027 / pH: x  Gluc: x / Ketone: Negative  / Bili: Negative / Urobili: Negative   Blood: x / Protein: Trace / Nitrite: Negative   Leuk Esterase: Negative / RBC: 2 /hpf / WBC 1 /HPF   Sq Epi: x / Non Sq Epi: 1 /hpf / Bacteria: Negative        MICROBIOLOGY:  RECENT CULTURES:   @ 02:11 Clean Catch Clean Catch (Midstream)     <10,000 CFU/mL Normal Urogenital Dariela       @ 01:36 .Blood Blood-Venous     No growth to date.            RADIOLOGY REVIEWED:  < from: MR Head No Cont (22 @ 10:25) >  ACC: [id number] EXAM:  MR BRAIN                          PROCEDURE DATE:  2022          INTERPRETATION:  INDICATION:    Stroke.  TECHNIQUE:  Multiplanar brain imaging was conducted. T1, T2 and FLAIR   imaging was performed.  In addition, diffusion imaging, diffusion   coefficient assessment (ADC) and T2* was incorporated . No contrast   administered.  COMPARISON EXAMINATION:  Prior CT 2022. Prior MR dated 2022.   FINDINGS:    HEMISPHERES: Again identified is a an area diffusion restriction   indicative of an acute infarct in the left MCA territory centered in the   left frontal lobe. There are scattered smaller foci more proximal and   distal, but with no infarct extension or hemorrhagic conversion as   compared to the prior MR. Small vessel ischemic changes are also again   noted elsewhere in both hemispheres with generalized volume loss.  VENTRICLES:  midline and normal in size  POSTERIOR FOSSA:  The brain stem and cerebellum are unremarkable in   appearance.  No CP angle abnormality is noted.  EXTRA-AXIAL:  No mass or collections are depicted.  VASCULATURE:  There is flow void in the left cavernous and supraclinoid   carotid artery  SINUSES AND MASTOIDS:  Clear.  MISCELLANEOUS:  No orbital or pituitary abnormality noted.  No skull base   lesion suggested.    IMPRESSION:    1)  Again identified is an acute/subacute  left MCA territory infarct.   There is no gross infarct extension or hemorrhagic conversion as compared   to the prior's CT and MR study.  2)  continued correlation and follow-up is recommended.    --- End of Report ---    < from: Transthoracic Echocardiogram (22 @ 10:17) >    Patient name: ACROLINE ROBERTO  YOB: 1955   Age: 67 (M)   MR#: [id number]  Study Date: 2022  Location: [address] [place], [place] [id number]: Layne Her RDCS  Study quality: Technically fair  Referring Physician: Jose Morales MD  Blood Pressure: 174/79 mmHg  Height: 183 cm  Weight: 91 kg  BSA: 2.1 m2  Heart Rhythm: Normal sinus  Heart Rate: 53 mmHg  ------------------------------------------------------------------------  PROCEDURE: Transthoracic echocardiogram with 2-D, M-Mode  and complete spectral and color flow Doppler.  INDICATION: Cerebral infarction, unspecified (I63.9)  ------------------------------------------------------------------------  Dimensions:    Normal Values:  LA:     3.8    2.0 - 4.0 cm  Ao:     3.0    2.0 - 3.8cm  SEPTUM: 1.0    0.6 - 1.2 cm  PWT:    1.1    0.6 - 1.1 cm  LVIDd:  4.2    3.0 - 5.6 cm  LVIDs:  2.9    1.8 - 4.0 cm  Derived variables:  LVMI: 69 g/m2  RWT: 0.52  EF (Visual Estimate): 65 %  Doppler Peak Velocity (m/sec): AoV=1.1 TV=2.4  ------------------------------------------------------------------------  Observations:  Mitral Valve: Mitral annular calcification. Minimal mitral  regurgitation.  Aortic Valve/Aorta: Mildly calcified aortic valve with  normal opening.  Normal aortic root size.  Left Atrium: Normal left atrium.  Left Ventricle: Normal left ventricular internal dimensions  and wall thicknesses.  Normal left ventricular systolic function. No segmental  wall motion abnormalities.  Impaired LV-relaxation with normal filling pressure.  Right Heart: Normal right atrium. Normal right ventricular  size and function.  Normal tricuspid valve. Minimal tricuspid regurgitation.  Normal pulmonic valve. Minimal pulmonic regurgitation.  Pericardium/Pleura: Normal pericardium with no pericardial  effusion.  Hemodynamic: Estimated right atrial pressure is normal.  No evidence of pulmonary hypertension.  ------------------------------------------------------------------------  Conclusions:  Normal left ventricular systolic function.No segmental  wall motion abnormalities.  ------------------------------------------------------------------------  Confirmed on  2022 - 18:21:28 by El Medina MD,  MACK  ------------------------------------------------------------------------    < end of copied text >            < end of copied text >          Impression:  Patient just returned from a 5 months trip to [facility], on flight home began to feel unwell, then word finding difficulty so came to hospital 4 d ago found to have a left mca stroke. He underwent cerebral angiogram, left carotid stenosis and today underwent stent to the carotid. He has had some low grade temps yesterday. He has no exam findings for infection, he was feeling fine until day prior to neuro deficit and he has negative cultures. I suspect fever is just reactive to the stroke.   Recommendations:  continue to monitor off antibiotics  monitor for etoh withdrawal  follow up cultures HPI:   Patient is a 67y male who developed word finding difficulty a few days ago hence presented to hospital and found to have left mca infarct. He had selective cerebral angiogram and found to have left carotid artery stenosis. Today he underwent stenting to the artery. He has had some low grade fevers over the past couple of days but denies any pain, n,v,d,dysuria, sob, cp, ha, back pain, cough, abdomen pain. He just returned from 5 months trip to [facility]. He felt fine while there but began to feel poorly on his flight home which was the day before admission. He had no febrile illnesses while there. He has not had any toothaches or dental procedures    REVIEW OF SYSTEMS:  All other review of systems negative (Comprehensive ROS)    PAST MEDICAL & SURGICAL HISTORY:  Glaucoma      Osteoarthritis      Knee pain, left  s/p TKR 8/15      Elevated liver enzymes  s/p tylenol use after TKR      S/P knee surgery  left knee at age 15      S/P inguinal hernia repair  right      Status post total left knee replacement  2015      S/P colonoscopy with polypectomy  benign, 2 years ago          Allergies    No Known Allergies    Intolerances        Antimicrobials Day #  :    Other Medications:  acetaminophen     Tablet .. 650 milliGRAM(s) Oral every 6 hours PRN  aspirin 325 milliGRAM(s) Oral daily  atorvastatin 80 milliGRAM(s) Oral at bedtime  cangrelor Infusion 2 MICROgram(s)/kG/Min IV Continuous <Continuous>  clopidogrel Tablet 75 milliGRAM(s) Oral daily  enoxaparin Injectable 40 milliGRAM(s) SubCutaneous every 24 hours  folic acid 1 milliGRAM(s) Oral daily  latanoprost 0.005% Ophthalmic Solution 1 Drop(s) Both EYES at bedtime  multivitamin 1 Tablet(s) Oral daily  niCARdipine Infusion 5 mG/Hr IV Continuous <Continuous>      FAMILY HISTORY:  Family history of brain cancer (Mother)    Family history of heart disease (Father)  cancer?        SOCIAL HISTORY:  Smoking: [ ]Yes [ x]No  ETOH: [x ]Yes [ ]No  Drug Use: [ ]Yes [ x]No   [ ]x Single[ ]    T(F): 97.3 (22 @ 12:10), Max: 100.4 (22 @ 21:30)  HR: 68 (22 @ 15:45)  BP: 130/65 (05-26-22 @ 15:30)  RR: 17 (22 @ 15:45)  SpO2: 94% (22 @ 15:45)  Wt(kg): --    PHYSICAL EXAM:  General: alert, no acute distress  Eyes:  anicteric, no conjunctival injection, no discharge  Oropharynx: no lesions or injection 	  Neck: supple, without adenopathy  Lungs: clear to auscultation  Heart: regular rate and rhythm; no murmur, rubs or gallops  Abdomen: soft, nondistended, nontender, without mass or organomegaly  Skin: no lesions  Extremities: no clubbing, cyanosis, or edema  Neurologic: alert, oriented, moves all extremities    LAB RESULTS:                        14.6   11.69 )-----------( 185      ( 26 May 2022 05:43 )             41.7         141  |  104  |  11  ----------------------------<  118<H>  3.4<L>   |  23  |  0.92    Ca    9.4      26 May 2022 05:43        Urinalysis Basic - ( 25 May 2022 02:10 )    Color: Yellow / Appearance: Clear / S.027 / pH: x  Gluc: x / Ketone: Negative  / Bili: Negative / Urobili: Negative   Blood: x / Protein: Trace / Nitrite: Negative   Leuk Esterase: Negative / RBC: 2 /hpf / WBC 1 /HPF   Sq Epi: x / Non Sq Epi: 1 /hpf / Bacteria: Negative        MICROBIOLOGY:  RECENT CULTURES:   @ 02:11 Clean Catch Clean Catch (Midstream)     <10,000 CFU/mL Normal Urogenital Dariela       @ 01:36 .Blood Blood-Venous     No growth to date.            RADIOLOGY REVIEWED:  < from: MR Head No Cont (22 @ 10:25) >  ACC: [id number] EXAM:  MR BRAIN                          PROCEDURE DATE:  2022          INTERPRETATION:  INDICATION:    Stroke.  TECHNIQUE:  Multiplanar brain imaging was conducted. T1, T2 and FLAIR   imaging was performed.  In addition, diffusion imaging, diffusion   coefficient assessment (ADC) and T2* was incorporated . No contrast   administered.  COMPARISON EXAMINATION:  Prior CT 2022. Prior MR dated 2022.   FINDINGS:    HEMISPHERES: Again identified is a an area diffusion restriction   indicative of an acute infarct in the left MCA territory centered in the   left frontal lobe. There are scattered smaller foci more proximal and   distal, but with no infarct extension or hemorrhagic conversion as   compared to the prior MR. Small vessel ischemic changes are also again   noted elsewhere in both hemispheres with generalized volume loss.  VENTRICLES:  midline and normal in size  POSTERIOR FOSSA:  The brain stem and cerebellum are unremarkable in   appearance.  No CP angle abnormality is noted.  EXTRA-AXIAL:  No mass or collections are depicted.  VASCULATURE:  There is flow void in the left cavernous and supraclinoid   carotid artery  SINUSES AND MASTOIDS:  Clear.  MISCELLANEOUS:  No orbital or pituitary abnormality noted.  No skull base   lesion suggested.    IMPRESSION:    1)  Again identified is an acute/subacute  left MCA territory infarct.   There is no gross infarct extension or hemorrhagic conversion as compared   to the prior's CT and MR study.  2)  continued correlation and follow-up is recommended.    --- End of Report ---    < from: Transthoracic Echocardiogram (22 @ 10:17) >    Patient name: CAROLINE ROBERTO  YOB: 1955   Age: 67 (M)   MR#: [id number]  Study Date: 2022  Location: [address] [place], [place] [id number]: Layne Her RDCS  Study quality: Technically fair  Referring Physician: Jose Morales MD  Blood Pressure: 174/79 mmHg  Height: 183 cm  Weight: 91 kg  BSA: 2.1 m2  Heart Rhythm: Normal sinus  Heart Rate: 53 mmHg  ------------------------------------------------------------------------  PROCEDURE: Transthoracic echocardiogram with 2-D, M-Mode  and complete spectral and color flow Doppler.  INDICATION: Cerebral infarction, unspecified (I63.9)  ------------------------------------------------------------------------  Dimensions:    Normal Values:  LA:     3.8    2.0 - 4.0 cm  Ao:     3.0    2.0 - 3.8cm  SEPTUM: 1.0    0.6 - 1.2 cm  PWT:    1.1    0.6 - 1.1 cm  LVIDd:  4.2    3.0 - 5.6 cm  LVIDs:  2.9    1.8 - 4.0 cm  Derived variables:  LVMI: 69 g/m2  RWT: 0.52  EF (Visual Estimate): 65 %  Doppler Peak Velocity (m/sec): AoV=1.1 TV=2.4  ------------------------------------------------------------------------  Observations:  Mitral Valve: Mitral annular calcification. Minimal mitral  regurgitation.  Aortic Valve/Aorta: Mildly calcified aortic valve with  normal opening.  Normal aortic root size.  Left Atrium: Normal left atrium.  Left Ventricle: Normal left ventricular internal dimensions  and wall thicknesses.  Normal left ventricular systolic function. No segmental  wall motion abnormalities.  Impaired LV-relaxation with normal filling pressure.  Right Heart: Normal right atrium. Normal right ventricular  size and function.  Normal tricuspid valve. Minimal tricuspid regurgitation.  Normal pulmonic valve. Minimal pulmonic regurgitation.  Pericardium/Pleura: Normal pericardium with no pericardial  effusion.  Hemodynamic: Estimated right atrial pressure is normal.  No evidence of pulmonary hypertension.  ------------------------------------------------------------------------  Conclusions:  Normal left ventricular systolic function.No segmental  wall motion abnormalities.  ------------------------------------------------------------------------  Confirmed on  2022 - 18:21:28 by lE Medina MD,  MACK  ------------------------------------------------------------------------    < end of copied text >            < end of copied text >          Impression:  Patient just returned from a 5 months trip to [facility], on flight home began to feel unwell, then word finding difficulty so came to hospital 4 d ago found to have a left mca stroke. He underwent cerebral angiogram, left carotid stenosis and today underwent stent to the carotid. He has had some low grade temps yesterday. He has no exam findings for infection, he was feeling fine until day prior to neuro deficit and he has negative cultures. I suspect fever is just reactive to the stroke.   Recommendations:  continue to monitor off antibiotics  monitor for etoh withdrawal  follow up cultures  will check malaria pcr

## 2022-05-26 NOTE — CHART NOTE - NSCHARTNOTEFT_GEN_A_CORE
Interventional Neuro- Radiology   Procedure Note    Procedure: Selective Cerebral Angiography and left supraclinoid ICA stent    Pre- Procedure Diagnosis: severe left ICA stenosis   Post- Procedure Diagnosis: s/p left supraclinoid ICA stent      : Dr. Adam MD  Fellow: Dr. Ann-Marie MD   Physician Assistant: Nighat Murphy PA-C    RN: Amena Sutton: Paul     Anesthesia: Dr. Jeronimo  (general anesthesia)    I/Os:  Fluids: 400cc   Newman: 100cc   Contrast: 54cc   Estimated Blood Loss: <10cc      Preliminary Report:  Under general anesthesia, using a 5Fr Fabuki sheath to the right groin examination of left internal carotid artery via selective cerebral angiography demonstrates worsening left supraclinoid ICA stenosis fro mprevious angiogram, s/p stent, residual left M1 stensois that is not flow limiting. ( Official note to follow).    Patient tolerated procedure well, vital signs stable, hemodynamically stable, on cangrelor drip. Results discussed with patient and their family. Groin sheath d/c'ed, manual compression held to hemostasis, no active bleeding, no hematoma, Vascade applied, quick clot and safeguard balloon dressing applied at 1145h.  Patient transferred to PACU for further care/ monitoring.     Nighat Murphy PA-C  [phone]

## 2022-05-26 NOTE — PROGRESS NOTE ADULT - ASSESSMENT
ASSESSMENT:  Left supraclinoid ICA stenosis   Day 0 post left ICA stenting     PLAN:  NC q1, c/w Asa 325mg, cangrelor ggt, CTA head in AM   c/w atorvastatin 80mg   Feeding: [x] diet  Analgesia/Sedation: tylenol   Seizure prophylaxis: [x] not indicated  Thromboprophylaxis: [x] SCDs   Head of Bed/Activity: [] 30 degrees [] mobilize as tolerated [x] PT [] OT [] PMNR  Ulcer prophylaxis: [] not indicated [] PPI [] other:  Glucose Control: Goal -180 [] RISS [] other:

## 2022-05-26 NOTE — PROGRESS NOTE ADULT - SUBJECTIVE AND OBJECTIVE BOX
HPI:  67 year old left-handed male with medical history of macular degeneration (no vision loss) and EtOH use (daily 6-7 beers/day, last drink 2 days ago) who presents as a transfer from [facility] for tertiary stroke treatment.  Family at bedside to corroborate story.  Patient flew back from [place] Saturday (5/21) morning and took a cab home.  He said he "didn't feel good".  Later in the day, he was speaking one word sentences.  This morning when he woke up, family noted that he could not complete a full sentence.  Initial CTH 5/22/2022 with subacute left MCA territory infarct in the frontal lobe.  CTA with severe focal narrowing of left supraclinoid ICA extending to the carotid terminus, as well as the diminished opacification of the left anterior cerebral artery.  CTP with large penumbra in left MCA territory with mismatch volume of 12.7cc.  Patient transferred to [facility].  Stroke score of 1 for mild non-fluent aphasia.    Day 0 post left ICA stenting     On admission, the patient was:  GCS: 15  NIHSS: 1  preMRS 0    VITALS:  T(C): , Max: 38 (05-25-22 @ 21:30)  HR:  (65 - 78)  BP:  (121/60 - 179/80)  ABP:  (120/55 - 157/57)  RR:  (13 - 21)  SpO2:  (94% - 100%)  Wt(kg): --      05-25-22 @ 07:01  -  05-26-22 @ 07:00  --------------------------------------------------------  IN: 240 mL / OUT: 700 mL / NET: -460 mL    05-26-22 @ 07:01  -  05-26-22 @ 14:15  --------------------------------------------------------  IN: 133.8 mL / OUT: 0 mL / NET: 133.8 mL      LABS:  Na: 141 (05-26 @ 05:43), 142 (05-25 @ 01:36), 141 (05-24 @ 04:55)  K: 3.4 (05-26 @ 05:43), 3.6 (05-25 @ 01:36), 3.5 (05-24 @ 04:55)  Cl: 104 (05-26 @ 05:43), 107 (05-25 @ 01:36), 106 (05-24 @ 04:55)  CO2: 23 (05-26 @ 05:43), 22 (05-25 @ 01:36), 23 (05-24 @ 04:55)  BUN: 11 (05-26 @ 05:43), 18 (05-25 @ 01:36), 10 (05-24 @ 04:55)  Cr: 0.92 (05-26 @ 05:43), 1.13 (05-25 @ 01:36), 0.97 (05-24 @ 04:55)  Glu: 118(05-26 @ 05:43), 131(05-25 @ 01:36), 102(05-24 @ 04:55)    Hgb: 14.6 (05-26 @ 05:43), 14.0 (05-25 @ 01:36), 14.3 (05-24 @ 04:55)  Hct: 41.7 (05-26 @ 05:43), 40.8 (05-25 @ 01:36), 40.7 (05-24 @ 04:55)  WBC: 11.69 (05-26 @ 05:43), 11.14 (05-25 @ 01:36), 7.02 (05-24 @ 04:55)  Plt: 185 (05-26 @ 05:43), 177 (05-25 @ 01:36), 171 (05-24 @ 04:55)      IMAGING:   Recent imaging studies were reviewed.    MEDICATIONS:  acetaminophen     Tablet .. 650 milliGRAM(s) Oral every 6 hours PRN  aspirin 325 milliGRAM(s) Oral daily  atorvastatin 80 milliGRAM(s) Oral at bedtime  cangrelor Infusion 2 MICROgram(s)/kG/Min IV Continuous <Continuous>  clopidogrel Tablet 75 milliGRAM(s) Oral daily  enoxaparin Injectable 40 milliGRAM(s) SubCutaneous every 24 hours  folic acid 1 milliGRAM(s) Oral daily  latanoprost 0.005% Ophthalmic Solution 1 Drop(s) Both EYES at bedtime  multivitamin 1 Tablet(s) Oral daily  niCARdipine Infusion 5 mG/Hr IV Continuous <Continuous>  potassium chloride    Tablet ER 20 milliEquivalent(s) Oral once    EXAMINATION:  General:  calm  HEENT:  MMM  Neuro:  awake, alert, oriented x 3, follows commands, moves all extremities, right UE drift, 4/5  Cards:  RRR  Respiratory:  no respiratory distress  Adomen:  soft  Extremities:  no edema  Skin:  warm/dry HPI:  67 year old left-handed male with medical history of macular degeneration (no vision loss) and EtOH use (daily 6-7 beers/day, last drink 2 days ago) who presents as a transfer from [facility] for tertiary stroke treatment.  Family at bedside to corroborate story.  Patient flew back from [place] Saturday (5/21) morning and took a cab home.  He said he "didn't feel good".  Later in the day, he was speaking one word sentences.  This morning when he woke up, family noted that he could not complete a full sentence.  Initial CTH 5/22/2022 with subacute left MCA territory infarct in the frontal lobe.  CTA with severe focal narrowing of left supraclinoid ICA extending to the carotid terminus, as well as the diminished opacification of the left anterior cerebral artery.  CTP with large penumbra in left MCA territory with mismatch volume of 12.7cc.  Patient transferred to [facility].  Stroke score of 1 for mild non-fluent aphasia.    Day 0 post left ICA stenting     On admission, the patient was:  GCS: 15  NIHSS: 1  preMRS 0    VITALS:  T(C): , Max: 38 (05-25-22 @ 21:30)  HR:  (65 - 78)  BP:  (121/60 - 179/80)  ABP:  (120/55 - 157/57)  RR:  (13 - 21)  SpO2:  (94% - 100%)  Wt(kg): --      05-25-22 @ 07:01  -  05-26-22 @ 07:00  --------------------------------------------------------  IN: 240 mL / OUT: 700 mL / NET: -460 mL    05-26-22 @ 07:01  -  05-26-22 @ 14:15  --------------------------------------------------------  IN: 133.8 mL / OUT: 0 mL / NET: 133.8 mL      LABS:  Na: 141 (05-26 @ 05:43), 142 (05-25 @ 01:36), 141 (05-24 @ 04:55)  K: 3.4 (05-26 @ 05:43), 3.6 (05-25 @ 01:36), 3.5 (05-24 @ 04:55)  Cl: 104 (05-26 @ 05:43), 107 (05-25 @ 01:36), 106 (05-24 @ 04:55)  CO2: 23 (05-26 @ 05:43), 22 (05-25 @ 01:36), 23 (05-24 @ 04:55)  BUN: 11 (05-26 @ 05:43), 18 (05-25 @ 01:36), 10 (05-24 @ 04:55)  Cr: 0.92 (05-26 @ 05:43), 1.13 (05-25 @ 01:36), 0.97 (05-24 @ 04:55)  Glu: 118(05-26 @ 05:43), 131(05-25 @ 01:36), 102(05-24 @ 04:55)    Hgb: 14.6 (05-26 @ 05:43), 14.0 (05-25 @ 01:36), 14.3 (05-24 @ 04:55)  Hct: 41.7 (05-26 @ 05:43), 40.8 (05-25 @ 01:36), 40.7 (05-24 @ 04:55)  WBC: 11.69 (05-26 @ 05:43), 11.14 (05-25 @ 01:36), 7.02 (05-24 @ 04:55)  Plt: 185 (05-26 @ 05:43), 177 (05-25 @ 01:36), 171 (05-24 @ 04:55)      IMAGING:   Recent imaging studies were reviewed.    MEDICATIONS:  acetaminophen     Tablet .. 650 milliGRAM(s) Oral every 6 hours PRN  aspirin 325 milliGRAM(s) Oral daily  atorvastatin 80 milliGRAM(s) Oral at bedtime  cangrelor Infusion 2 MICROgram(s)/kG/Min IV Continuous <Continuous>  clopidogrel Tablet 75 milliGRAM(s) Oral daily  enoxaparin Injectable 40 milliGRAM(s) SubCutaneous every 24 hours  folic acid 1 milliGRAM(s) Oral daily  latanoprost 0.005% Ophthalmic Solution 1 Drop(s) Both EYES at bedtime  multivitamin 1 Tablet(s) Oral daily  niCARdipine Infusion 5 mG/Hr IV Continuous <Continuous>  potassium chloride    Tablet ER 20 milliEquivalent(s) Oral once    EXAMINATION:  General:  calm  HEENT:  MMM  Neuro:  awake, alert, oriented x 3, follows commands, moves all extremities, right UE drift, 4/5  Cards:  RRR  Respiratory:  no respiratory distress  Adomen:  soft  Extremities:  no edema, no groin hematoma, normal pulses   Skin:  warm/dry

## 2022-05-26 NOTE — PROGRESS NOTE ADULT - SUBJECTIVE AND OBJECTIVE BOX
NEUROCRITICAL CARE  EVENING NOTE    BRIEF SUMMARY:  67yMale presented with Patient is a 67y old  Male who presents with a chief complaint of Left MCA ischemic infarct (26 May 2022 15:46)        VITALS/IMAGING/DATA/IVF FLUIDS/MEDICATIONS: [x] Reviewed    ALLERGIES: Allergies  No Known Allergies  Intolerances        EXAMINATION:  General: No acute distress  HEENT: Anicteric sclerae,    Cardiac: [id number]  Lungs: Clear  Abdomen: Soft, non-tender, +BS  Extremities: No c/c/e  Skin/Incision Site: Clean, dry and intact  Neurologic: Awake, alert, fully oriented, follows commands, EOMI, face symmetric, tongue midline, no drift, RUE 4+/5 (baseline)

## 2022-05-26 NOTE — PROGRESS NOTE ADULT - ASSESSMENT
Summary:   Left supraclinoid ICA stenosis   Day 0 post left ICA stenting     NEURO:    q1h neuro checks   mg  Cangrelor gtt  CT, CTA tomorrow  Tylenol     CARDS:    -140  Cardene gtt    PULM:    Nasal canula    RENAL:    IVF  I/O monitoring    GASTRO:    advance as tolerated  Stress ulcer prophylaxis:  not indicated    HEME:    monitor H/H    DVT prophylaxis: SCDs, hold anticoagulation, fresh post op    ENDO:   -180 mg/dl    ID:    Monitor for fever     Code status:  Full code  Disposition:  ICU    This patient was at high risk of neurologic deterioration and/or death due to: intracranial hemorrhage, ischemic stroke    Summary:   Left supraclinoid ICA stenosis   Day 0 post left ICA stenting     NEURO:    q1h neuro checks   mg  Cangrelor gtt  CT, CTA tomorrow  Tylenol     CARDS:    -140  HTN on Cardene gtt    PULM:    Nasal canula    RENAL:    IVF  I/O monitoring    GASTRO:    advance as tolerated  Stress ulcer prophylaxis:  not indicated    HEME:    monitor H/H    DVT prophylaxis: SCDs, hold anticoagulation, fresh post op    ENDO:   -180 mg/dl    ID:    Monitor for fever     Code status:  Full code  Disposition:  ICU    This patient was at high risk of neurologic deterioration and/or death due to: intracranial hemorrhage, ischemic stroke    Summary:   Left supraclinoid ICA stenosis   Day 0 post left ICA stenting     NEURO:    q1h neuro checks   mg  Cangrelor gtt  CT, CTA tomorrow  Tylenol     CARDS:    -140  HTN on Cardene gtt  monitor for bradycardia   groin check, pulse check     PULM:    Nasal canula    RENAL:    IVF  I/O monitoring    GASTRO:    advance as tolerated  Stress ulcer prophylaxis:  not indicated    HEME:    monitor H/H    DVT prophylaxis: SCDs, hold anticoagulation, fresh post op    ENDO:   -180 mg/dl    ID:    Monitor for fever     Code status:  Full code  Disposition:  ICU    This patient was at high risk of neurologic deterioration and/or death due to: intracranial hemorrhage, ischemic stroke

## 2022-05-26 NOTE — PROVIDER CONTACT NOTE (OTHER) - ACTION/TREATMENT ORDERED:
Pt helped to bed, Pt lied flat, 500cc bolus initiated, Head CT, MR Head ordered.
Blood culturex2, CBC, BMP, UA, urine for culture, chest x-ray are done. Tylenol 1gm IVPBx1 given

## 2022-05-26 NOTE — PROVIDER CONTACT NOTE (OTHER) - SITUATION
Pt tx from bed to chair. Ambulated to restroom for hygiene. Post ambulation, pt demonstrated increased difficulty with R. side.
Temperature-100.1 rectally

## 2022-05-26 NOTE — PROVIDER CONTACT NOTE (OTHER) - ASSESSMENT
Temperature-100.1 rectally
Aox3. Following commands. Pt expressing feeling weaker in RUE/RLE. R hand impaired fine motor skills, wrist also weak.  Assessment reveals increased effort to get RUE/RLE off bed and maintain. Vision intact, Denies pain, sob, dyspnea. /95 @ 0816,  102/90 @ 0834, 156/79 @ 0840, 164/88 @ 0843

## 2022-05-26 NOTE — PROGRESS NOTE ADULT - ASSESSMENT
67 year old left-handed male with medical history of macular degeneration (no vision loss) and EtOH use (daily 6-7 beers/day, last drink 2 days ago) who presents as a transfer from [facility] for tertiary stroke treatment.  Family at bedside to corroborate story.  Patient flew back from [place] Saturday (5/21) morning and took a cab home.  He said he "didn't feel good".  Later in the day, he was speaking one word sentences.  This morning when he woke up, family noted that he could not complete a full sentence.  Initial CTH 5/22/2022 with subacute left MCA territory infarct in the frontal lobe.  CTA with severe focal narrowing of left supraclinoid ICA extending to the carotid terminus, as well as the diminished opacification of the left anterior cerebral artery.  CTP with large penumbra in left MCA territory with mismatch volume of 12.7cc.  Patient transferred to [facility].  Stroke score of 1 for mild non-fluent aphasia.    Impression.  Mild-moderate or moderate transcortical motor aphasia, due to left hemispheric border zone infarction, with left supraclinoid ICA and M1 stenosis.  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     NEURO: Neuro exam unchanged, Continue close monitoring for neurologic deterioration, permissive HTN to gradual normotension, : started on high dose statin, angiogram on 05/23/22: shows a left ICA stenosis no intervention indicated will follow with Dr. Medina as outpatient.  MRI Brain w/o, MRA Head and Neck w/o results as noted above. Physical therapy/OT recommending acute rehab.     ANTITHROMBOTIC THERAPY: Aspirin and Plavix for 3 months, followed  by ASA only as per Sammpris trial for secondary stroke prevention     PULMONARY: CXR (05/25/) clear, Protecting airway, saturating well     CARDIOVASCULAR: TTE shows EF of 65%, Normal left ventricular systolic function. No segmental wall motion abnormalities, continue with cardiac monitoring.                              SBP goal: Permissive HTN to gradual normotension    GASTROINTESTINAL:  dysphagia screen passed, tolerating diet      Diet: Regular     RENAL: BUN/Cr without acute change, good urine output      Na Goal: Greater than 135     Newman: No    HEMATOLOGY: H/H without acute change, Platelets 185no signs of bleeding. LE doppler (05/24): No evidence of DVT in either lower extremity.  DVT ppx: LMWH      ID: afebrile, Tmax 38, leukocytosis, infectious work up negative, ID consulted. will continue to monitor     OTHER: All questions and concerns addressed with patient and family at bedside. Alcohol abuse counseling provided, will continue CIWA protocol     DISPOSITION: Acute Rehab as per PT/OT eval once stable and workup is complete.    CORE MEASURES:        Admission NIHSS: 1     TPA: [] YES [x] NO      LDL/HDL: 140/48     Depression Screen: p     Statin Therapy: y     Dysphagia Screen: [x] PASS [] FAIL     Smoking [] YES [x] NO      Afib [] YES [x] NO     Stroke Education [x] YES [] NO    Obtain screening lower extremity venous ultrasound in patients who meet 1 or more of the following criteria as patient is high risk for DVT/PE on admission:   [] History of DVT/PE  []Hypercoagulable states (Factor V Leiden, Cancer, OCP, etc. )  [x]Prolonged immobility (hemiplegia/hemiparesis/post operative or any other extended immobilization)- flight   [] Transferred from outside facility (Rehab or Long term care)  [] Age </= to 50.   67 year old left-handed male with medical history of macular degeneration (no vision loss) and EtOH use (daily 6-7 beers/day, last drink 2 days ago) who presents as a transfer from [facility] for tertiary stroke treatment.  Family at bedside to corroborate story.  Patient flew back from [place] Saturday (5/21) morning and took a cab home.  He said he "didn't feel good".  Later in the day, he was speaking one word sentences.  This morning when he woke up, family noted that he could not complete a full sentence.  Initial CTH 5/22/2022 with subacute left MCA territory infarct in the frontal lobe.  CTA with severe focal narrowing of left supraclinoid ICA extending to the carotid terminus, as well as the diminished opacification of the left anterior cerebral artery.  CTP with large penumbra in left MCA territory with mismatch volume of 12.7cc.  Patient transferred to [facility].  Stroke score of 1 for mild non-fluent aphasia.    Impression.  Mild-moderate or moderate transcortical motor aphasia, due to left hemispheric border zone infarction, with left supraclinoid ICA and M1 stenosis.  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     NEURO: Neuro exam worse this morning now with right sided weakness, Initial angiogram on 05/23/22: showed a left ICA stenosis no intervention indicated at that time, neurosurgery re-consulted after worsening exam this am, patient  brought back to IR now s/p  left supraclinoid ICA stent for severe left ICA stenosis. permissive HTN to gradual normotension, : started on high dose statin, initial angiogram on 05/23/22: showed a left ICA stenosis no intervention indicated.  MRI Brain w/o, MRA Head and Neck w/o results as noted above. Physical therapy/OT recommending acute rehab.     ANTITHROMBOTIC THERAPY: Aspirin and Plavix for carotid stent     PULMONARY: CXR (05/25/) clear, Protecting airway, saturating well     CARDIOVASCULAR: TTE shows EF of 65%, Normal left ventricular systolic function. No segmental wall motion abnormalities, continue with cardiac monitoring.                              SBP goal: Permissive HTN to gradual normotension    GASTROINTESTINAL:  dysphagia screen passed, tolerating diet      Diet: Regular     RENAL: BUN/Cr without acute change, good urine output      Na Goal: Greater than 135     Newman: No    HEMATOLOGY: H/H without acute change, Platelets 185no signs of bleeding. LE doppler (05/24): No evidence of DVT in either lower extremity.  DVT ppx: LMWH      ID: afebrile, Tmax 38, leukocytosis, infectious work up negative, ID consulted. will continue to monitor     OTHER: All questions and concerns addressed with patient and family at bedside. Alcohol abuse counseling provided, will continue CIWA protocol     DISPOSITION: Acute Rehab as per PT/OT eval once stable and workup is complete.    CORE MEASURES:        Admission NIHSS: 1     TPA: [] YES [x] NO      LDL/HDL: 140/48     Depression Screen: p     Statin Therapy: y     Dysphagia Screen: [x] PASS [] FAIL     Smoking [] YES [x] NO      Afib [] YES [x] NO     Stroke Education [x] YES [] NO    Obtain screening lower extremity venous ultrasound in patients who meet 1 or more of the following criteria as patient is high risk for DVT/PE on admission:   [] History of DVT/PE  []Hypercoagulable states (Factor V Leiden, Cancer, OCP, etc. )  [x]Prolonged immobility (hemiplegia/hemiparesis/post operative or any other extended immobilization)- flight   [] Transferred from outside facility (Rehab or Long term care)  [] Age </= to 50.   67 year old left-handed male with medical history of macular degeneration (no vision loss) and EtOH use (daily 6-7 beers/day, last drink 2 days ago) who presents as a transfer from [facility] for tertiary stroke treatment.  Family at bedside to corroborate story.  Patient flew back from [place] Saturday (5/21) morning and took a cab home.  He said he "didn't feel good".  Later in the day, he was speaking one word sentences.  This morning when he woke up, family noted that he could not complete a full sentence.  Initial CTH 5/22/2022 with subacute left MCA territory infarct in the frontal lobe.  CTA with severe focal narrowing of left supraclinoid ICA extending to the carotid terminus, as well as the diminished opacification of the left anterior cerebral artery.  CTP with large penumbra in left MCA territory with mismatch volume of 12.7cc.  Patient transferred to [facility].  Stroke score of 1 for mild non-fluent aphasia.    Impression.  Mild-moderate or moderate transcortical motor aphasia, due to left hemispheric border zone infarction, with left supraclinoid ICA and M1 stenosis.  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     NEURO: Neuro exam worse this morning now with right sided weakness, Initial angiogram on 05/23/22: showed a left ICA stenosis no intervention indicated at that time, neurosurgery re-consulted after worsening exam this am, patient  brought back to IR now s/p  left supraclinoid ICA stent for severe left ICA stenosis. permissive HTN to gradual normotension, : started on high dose statin,   MRI Brain w/o, MRA Head and Neck w/o results as noted above. Physical therapy/OT recommending acute rehab.     ANTITHROMBOTIC THERAPY: Aspirin and Plavix for carotid stent     PULMONARY: CXR (05/25/) clear, Protecting airway, saturating well     CARDIOVASCULAR: TTE shows EF of 65%, Normal left ventricular systolic function. No segmental wall motion abnormalities, continue with cardiac monitoring.                              SBP goal: Permissive HTN to gradual normotension    GASTROINTESTINAL:  dysphagia screen passed, tolerating diet      Diet: Regular     RENAL: BUN/Cr without acute change, good urine output      Na Goal: Greater than 135     Newman: No    HEMATOLOGY: H/H without acute change, Platelets 185no signs of bleeding. LE doppler (05/24): No evidence of DVT in either lower extremity.  DVT ppx: LMWH      ID: afebrile, Tmax 38, leukocytosis, infectious work up negative, ID consulted. will continue to monitor     OTHER: All questions and concerns addressed with patient and family at bedside. Alcohol abuse counseling provided, will continue CIWA protocol     DISPOSITION: Acute Rehab as per PT/OT eval once stable and workup is complete.    CORE MEASURES:        Admission NIHSS: 1     TPA: [] YES [x] NO      LDL/HDL: 140/48     Depression Screen: p     Statin Therapy: y     Dysphagia Screen: [x] PASS [] FAIL     Smoking [] YES [x] NO      Afib [] YES [x] NO     Stroke Education [x] YES [] NO    Obtain screening lower extremity venous ultrasound in patients who meet 1 or more of the following criteria as patient is high risk for DVT/PE on admission:   [] History of DVT/PE  []Hypercoagulable states (Factor V Leiden, Cancer, OCP, etc. )  [x]Prolonged immobility (hemiplegia/hemiparesis/post operative or any other extended immobilization)- flight   [] Transferred from outside facility (Rehab or Long term care)  [] Age </= to 50.

## 2022-05-26 NOTE — PROGRESS NOTE ADULT - SUBJECTIVE AND OBJECTIVE BOX
THE PATIENT WAS SEEN AND EXAMINED BY ME WITH THE HOUSESTAFF AND STROKE TEAM DURING MORNING ROUNDS.   HPI:  67 year old left-handed male with medical history of macular degeneration (no vision loss) and EtOH use (daily 6-7 beers/day, last drink 2 days ago) who presents as a transfer from [facility] for tertiary stroke treatment.  Family at bedside to corroborate story.  Patient flew back from [place] Saturday (5/21) morning and took a cab home.  He said he "didn't feel good".  Later in the day, he was speaking one word sentences.  On morning of admission when he woke up, family noted that he could not complete a full sentence.  Initial CTH 5/22/2022 with subacute left MCA territory infarct in the frontal lobe.  CTA with severe focal narrowing of left supraclinoid ICA extending to the carotid terminus, as well as the diminished opacification of the left anterior cerebral artery.  CTP with large penumbra in left MCA territory with mismatch volume of 12.7cc.  Patient transferred to [facility].  Stroke score of 1 for mild non-fluent aphasia. NIHSS 1 preMRS 0     SUBJECTIVE: No events overnight.  No new neurologic complaints.      acetaminophen     Tablet .. 650 milliGRAM(s) Oral every 6 hours PRN  aspirin  chewable 81 milliGRAM(s) Oral daily  atorvastatin 80 milliGRAM(s) Oral at bedtime  clopidogrel Tablet 75 milliGRAM(s) Oral daily  enoxaparin Injectable 40 milliGRAM(s) SubCutaneous every 24 hours  folic acid 1 milliGRAM(s) Oral daily  latanoprost 0.005% Ophthalmic Solution 1 Drop(s) Both EYES at bedtime  multivitamin 1 Tablet(s) Oral daily  potassium chloride    Tablet ER 20 milliEquivalent(s) Oral once      PHYSICAL EXAM:   Vital Signs Last 24 Hrs  T(C): 37.3 (26 May 2022 05:00), Max: 38 (25 May 2022 21:30)  T(F): 99.1 (26 May 2022 05:00), Max: 100.4 (25 May 2022 21:30)  HR: 74 (26 May 2022 06:00) (67 - 79)  BP: 155/68 (26 May 2022 06:00) (155/68 - 179/80)  BP(mean): 92 (26 May 2022 06:00) (91 - 109)  RR: 16 (26 May 2022 06:00) (13 - 21)  SpO2: 96% (26 May 2022 06:00) (94% - 99%)    General: No acute distress  HEENT: EOM intact, BTT less on R  Abdomen: Soft, nontender, nondistended   Extremities: No edema    NEUROLOGICAL EXAM:  Mental status: Eyes open, awake, alert, oriented to self, date and hospital, speech moderately dysfluent,  follows cross commands with some hesitancy; repetition intact  Cranial Nerves: No facial asymmetry, no nystagmus, mild dysarthria,  tongue midline  Motor exam: Normal tone, no drift, RUE 5/5, RLE 5/5, LUE 5/5, LLE 5/5 , normal left fine finger movements, slightly clumsy on right.  Sensation: Intact to light touch   Coordination/ Gait: No dysmetria, gait deferred      LABS:                        14.6   11.69 )-----------( 185      ( 26 May 2022 05:43 )             41.7    05-26    141  |  104  |  11  ----------------------------<  118<H>  3.4<L>   |  23  |  0.92    Ca    9.4      26 May 2022 05:43          IMAGING: Reviewed by me.   MR Head/MRA Head and Neck (5/23/22): Acute left middle cerebral artery infarct similar to that predicted on the CT perfusion. No hemorrhage. Marked narrowing of the left supraclinoid internal carotid artery with diminished antegrade flow in the left anterior and middle cerebral arteries. No significant cross-filling from the right anterior communicating artery or the left posterior communicating artery. Markedly elevated flow in the left posterior cerebral artery suggesting chronicity with collateralization. Noninvasive flow MR angiography     5/22/22:  CT PERFUSION: Large ischemic penumbra in the left middle cerebral artery territory with mismatch volume of 12.7.    CTA COW:   Diminished opacification of the left internal carotid artery, with focal severe narrowing of the left supraclinoid ICA extending to the carotid terminus, as well as diminished opacification of the left anterior cerebral artery and diminished caliber and opacification of left middle cerebral artery branches, including markedly severe effacement of the left M1 segment. No vessel cut off.    CTA NECK:   Patent, ECAs, ICAs, although diffusely decreased caliber of the left internal carotid artery extending to the skull base, cannot exclude a dissection. Atherosclerotic plaque at both carotid bifurcations with less than 50% stenosis at the ICA origins by NASCET criteria. Bilateral vertebral arteries are patent without flow limiting stenosis.    CTH 5/22/22:  Subacute left middle cerebral artery territory infarct in the frontal lobe. No evidence for hemorrhagic conversion.   THE PATIENT WAS SEEN AND EXAMINED BY ME WITH THE HOUSESTAFF AND STROKE TEAM DURING MORNING ROUNDS.   HPI:  67 year old left-handed male with medical history of macular degeneration (no vision loss) and EtOH use (daily 6-7 beers/day, last drink 2 days ago) who presents as a transfer from [facility] for tertiary stroke treatment.  Family at bedside to corroborate story.  Patient flew back from [place] Saturday (5/21) morning and took a cab home.  He said he "didn't feel good".  Later in the day, he was speaking one word sentences.  On morning of admission when he woke up, family noted that he could not complete a full sentence.  Initial CTH 5/22/2022 with subacute left MCA territory infarct in the frontal lobe.  CTA with severe focal narrowing of left supraclinoid ICA extending to the carotid terminus, as well as the diminished opacification of the left anterior cerebral artery.  CTP with large penumbra in left MCA territory with mismatch volume of 12.7cc.  Patient transferred to [facility].  Stroke score of 1 for mild non-fluent aphasia. NIHSS 1 preMRS 0     SUBJECTIVE: Acute neuro change this morning, new right sided weakness.    acetaminophen     Tablet .. 650 milliGRAM(s) Oral every 6 hours PRN  aspirin  chewable 81 milliGRAM(s) Oral daily  atorvastatin 80 milliGRAM(s) Oral at bedtime  clopidogrel Tablet 75 milliGRAM(s) Oral daily  enoxaparin Injectable 40 milliGRAM(s) SubCutaneous every 24 hours  folic acid 1 milliGRAM(s) Oral daily  latanoprost 0.005% Ophthalmic Solution 1 Drop(s) Both EYES at bedtime  multivitamin 1 Tablet(s) Oral daily  potassium chloride    Tablet ER 20 milliEquivalent(s) Oral once      PHYSICAL EXAM:   Vital Signs Last 24 Hrs  T(C): 37.3 (26 May 2022 05:00), Max: 38 (25 May 2022 21:30)  T(F): 99.1 (26 May 2022 05:00), Max: 100.4 (25 May 2022 21:30)  HR: 74 (26 May 2022 06:00) (67 - 79)  BP: 155/68 (26 May 2022 06:00) (155/68 - 179/80)  BP(mean): 92 (26 May 2022 06:00) (91 - 109)  RR: 16 (26 May 2022 06:00) (13 - 21)  SpO2: 96% (26 May 2022 06:00) (94% - 99%)    General: No acute distress  HEENT: EOM intact, BTT less on R  Abdomen: Soft, nontender, nondistended   Extremities: No edema    NEUROLOGICAL EXAM:  Mental status: Eyes open, awake, alert, oriented to self, date and hospital, speech moderately dysfluent,  follows cross commands with some hesitancy; repetition intact  Cranial Nerves: No facial asymmetry, no nystagmus, mild dysarthria,  tongue midline  Motor exam: Normal tone, RUE/RLE drift, 2/5 right wrist, decreased fine finger movements to right hand, LUE 5/5, LLE 5/5   Sensation: Intact to light touch   Coordination/ Gait: No dysmetria, gait deferred      LABS:                        14.6   11.69 )-----------( 185      ( 26 May 2022 05:43 )             41.7    05-26    141  |  104  |  11  ----------------------------<  118<H>  3.4<L>   |  23  |  0.92    Ca    9.4      26 May 2022 05:43          IMAGING: Reviewed by me.   MR Head/MRA Head and Neck (5/23/22): Acute left middle cerebral artery infarct similar to that predicted on the CT perfusion. No hemorrhage. Marked narrowing of the left supraclinoid internal carotid artery with diminished antegrade flow in the left anterior and middle cerebral arteries. No significant cross-filling from the right anterior communicating artery or the left posterior communicating artery. Markedly elevated flow in the left posterior cerebral artery suggesting chronicity with collateralization. Noninvasive flow MR angiography     5/22/22:  CT PERFUSION: Large ischemic penumbra in the left middle cerebral artery territory with mismatch volume of 12.7.    CTA COW:   Diminished opacification of the left internal carotid artery, with focal severe narrowing of the left supraclinoid ICA extending to the carotid terminus, as well as diminished opacification of the left anterior cerebral artery and diminished caliber and opacification of left middle cerebral artery branches, including markedly severe effacement of the left M1 segment. No vessel cut off.    CTA NECK:   Patent, ECAs, ICAs, although diffusely decreased caliber of the left internal carotid artery extending to the skull base, cannot exclude a dissection. Atherosclerotic plaque at both carotid bifurcations with less than 50% stenosis at the ICA origins by NASCET criteria. Bilateral vertebral arteries are patent without flow limiting stenosis.    CTH 5/22/22:  Subacute left middle cerebral artery territory infarct in the frontal lobe. No evidence for hemorrhagic conversion.   THE PATIENT WAS SEEN AND EXAMINED BY ME WITH THE HOUSESTAFF AND STROKE TEAM DURING MORNING ROUNDS.   HPI:  67 year old left-handed male with medical history of macular degeneration (no vision loss) and EtOH use (daily 6-7 beers/day, last drink 2 days ago) who presents as a transfer from [facility] for tertiary stroke treatment.  Family at bedside to corroborate story.  Patient flew back from [place] Saturday (5/21) morning and took a cab home.  He said he "didn't feel good".  Later in the day, he was speaking one word sentences.  On morning of admission when he woke up, family noted that he could not complete a full sentence.  Initial CTH 5/22/2022 with subacute left MCA territory infarct in the frontal lobe.  CTA with severe focal narrowing of left supraclinoid ICA extending to the carotid terminus, as well as the diminished opacification of the left anterior cerebral artery.  CTP with large penumbra in left MCA territory with mismatch volume of 12.7cc.  Patient transferred to [facility].  Stroke score of 1 for mild non-fluent aphasia. NIHSS 1 preMRS 0     SUBJECTIVE: Acute neuro change this morning, new right sided weakness.    acetaminophen     Tablet .. 650 milliGRAM(s) Oral every 6 hours PRN  aspirin  chewable 81 milliGRAM(s) Oral daily  atorvastatin 80 milliGRAM(s) Oral at bedtime  clopidogrel Tablet 75 milliGRAM(s) Oral daily  enoxaparin Injectable 40 milliGRAM(s) SubCutaneous every 24 hours  folic acid 1 milliGRAM(s) Oral daily  latanoprost 0.005% Ophthalmic Solution 1 Drop(s) Both EYES at bedtime  multivitamin 1 Tablet(s) Oral daily  potassium chloride    Tablet ER 20 milliEquivalent(s) Oral once      PHYSICAL EXAM:   Vital Signs Last 24 Hrs  T(C): 37.3 (26 May 2022 05:00), Max: 38 (25 May 2022 21:30)  T(F): 99.1 (26 May 2022 05:00), Max: 100.4 (25 May 2022 21:30)  HR: 74 (26 May 2022 06:00) (67 - 79)  BP: 155/68 (26 May 2022 06:00) (155/68 - 179/80)  BP(mean): 92 (26 May 2022 06:00) (91 - 109)  RR: 16 (26 May 2022 06:00) (13 - 21)  SpO2: 96% (26 May 2022 06:00) (94% - 99%)    General: No acute distress  HEENT: EOM intact, BTT less on R  Abdomen: Soft, nontender, nondistended   Extremities: No edema    NEUROLOGICAL EXAM:  Mental status: Eyes open, awake, alert, oriented to self, date and hospital, speech moderately dysfluent,  follows cross commands with some hesitancy; repetition intact  Cranial Nerves: No facial asymmetry, no nystagmus, mild dysarthria,  tongue midline  Motor exam: Normal tone, RUE/RLE drift, 2/5 right wrist extensors, decreased fine finger movements to right hand, LUE 5/5, LLE 5/5   Sensation: Intact to light touch   Coordination/ Gait: No dysmetria, gait deferred      LABS:                        14.6   11.69 )-----------( 185      ( 26 May 2022 05:43 )             41.7    05-26    141  |  104  |  11  ----------------------------<  118<H>  3.4<L>   |  23  |  0.92    Ca    9.4      26 May 2022 05:43          IMAGING: Reviewed by me.   MR Head/MRA Head and Neck (5/23/22): Acute left middle cerebral artery infarct similar to that predicted on the CT perfusion. No hemorrhage. Marked narrowing of the left supraclinoid internal carotid artery with diminished antegrade flow in the left anterior and middle cerebral arteries. No significant cross-filling from the right anterior communicating artery or the left posterior communicating artery. Markedly elevated flow in the left posterior cerebral artery suggesting chronicity with collateralization. Noninvasive flow MR angiography     5/22/22:  CT PERFUSION: Large ischemic penumbra in the left middle cerebral artery territory with mismatch volume of 12.7.    CTA COW:   Diminished opacification of the left internal carotid artery, with focal severe narrowing of the left supraclinoid ICA extending to the carotid terminus, as well as diminished opacification of the left anterior cerebral artery and diminished caliber and opacification of left middle cerebral artery branches, including markedly severe effacement of the left M1 segment. No vessel cut off.    CTA NECK:   Patent, ECAs, ICAs, although diffusely decreased caliber of the left internal carotid artery extending to the skull base, cannot exclude a dissection. Atherosclerotic plaque at both carotid bifurcations with less than 50% stenosis at the ICA origins by NASCET criteria. Bilateral vertebral arteries are patent without flow limiting stenosis.    CTH 5/22/22:  Subacute left middle cerebral artery territory infarct in the frontal lobe. No evidence for hemorrhagic conversion.

## 2022-05-26 NOTE — CHART NOTE - NSCHARTNOTEFT_GEN_A_CORE
Interventional Neuro Radiology  Pre-Procedure Note    This is a 67 year old left-handed male with medical history of macular degeneration (no vision loss) and EtOH use (daily 6-7 beers/day, last drink 2 days ago) who presents as a transfer from [facility] for tertiary stroke treatment.  Family at bedside to corroborate story.  Patient flew back from [place] Saturday (5/21) morning and took a cab home.  He said he "didn't feel good".  Later in the day, he was speaking one word sentences.  On morning of admission when he woke up, family noted that he could not complete a full sentence.  Initial CTH 5/22/2022 with subacute left MCA territory infarct in the frontal lobe.  CTA with severe focal narrowing of left supraclinoid ICA extending to the carotid terminus, as well as the diminished opacification of the left anterior cerebral artery.  CTP with large penumbra in left MCA territory with mismatch volume of 12.7cc.  Patient transferred to [facility].  Stroke score of 1 for mild non-fluent aphasia. NIHSS 1 preMRS 0. Patient s/p angiogram showing severe left ICA stenosis, developed right hemiplegia this AM, patient presents now to neuro IR for selective cerebral angiogram and stenting.       PAST MEDICAL & SURGICAL HISTORY:  Glaucoma  Osteoarthritis  Knee pain, left s/p TKR 8/15  Elevated liver enzymes s/p Tylenol use after TKR  S/P knee surgery left knee at age 15  S/P inguinal hernia repair right  Status post total left knee replacement 8/2015  S/P colonoscopy with polypectomy benign, 2 years ago    Social History:   Denies tobacco use    FAMILY HISTORY:  No pertinent family history    Allergies:   No Known Allergies      Current Medications:   acetaminophen     Tablet .. 650 milliGRAM(s) Oral every 6 hours PRN  aspirin  chewable 81 milliGRAM(s) Oral daily  atorvastatin 80 milliGRAM(s) Oral at bedtime  cangrelor Infusion 2 MICROgram(s)/kG/Min IV Continuous <Continuous>  clopidogrel Tablet 75 milliGRAM(s) Oral daily  enoxaparin Injectable 40 milliGRAM(s) SubCutaneous every 24 hours  folic acid 1 milliGRAM(s) Oral daily  latanoprost 0.005% Ophthalmic Solution 1 Drop(s) Both EYES at bedtime  multivitamin 1 Tablet(s) Oral daily  potassium chloride    Tablet ER 20 milliEquivalent(s) Oral once      Labs:                         14.6   11.69 )-----------( 185      ( 26 May 2022 05:43 )             41.7       05-26    141  |  104  |  11  ----------------------------<  118<H>  3.4<L>   |  23  |  0.92    Ca    9.4      26 May 2022 05:43  Phos  2.8     05-23  Mg     2.1     05-23    TPro  6.4  /  Alb  4.0  /  TBili  0.4  /  DBili  0.1  /  AST  20  /  ALT  25  /  AlkPhos  88  05-22      Assessment/Plan:   This is a 66yo male  presents with severe ICA stenosis with worsening stenosis. Patient presents to neuro-IR for selective cerebral angiography. Procedure/ risks/ benefits/ goals/ alternatives were explained. Risks include but are not limited to stroke/ vessel injury/ hemorrhage/ groin hematoma. All questions answered. Informed content obtained from patient's sister in law. Consent placed in chart.       Nighat Murphy PA-C  [phone] TRACE VS MILD RPE CHANGES.

## 2022-05-27 LAB
ANION GAP SERPL CALC-SCNC: 11 MMOL/L — SIGNIFICANT CHANGE UP (ref 5–17)
BUN SERPL-MCNC: 14 MG/DL — SIGNIFICANT CHANGE UP (ref 7–23)
CALCIUM SERPL-MCNC: 8.4 MG/DL — SIGNIFICANT CHANGE UP (ref 8.4–10.5)
CHLORIDE SERPL-SCNC: 107 MMOL/L — SIGNIFICANT CHANGE UP (ref 96–108)
CO2 SERPL-SCNC: 21 MMOL/L — LOW (ref 22–31)
CREAT SERPL-MCNC: 0.94 MG/DL — SIGNIFICANT CHANGE UP (ref 0.5–1.3)
CULTURE RESULTS: SIGNIFICANT CHANGE UP
CULTURE RESULTS: SIGNIFICANT CHANGE UP
EGFR: 88 ML/MIN/1.73M2 — SIGNIFICANT CHANGE UP
GLUCOSE SERPL-MCNC: 177 MG/DL — HIGH (ref 70–99)
HCT VFR BLD CALC: 34.4 % — LOW (ref 39–50)
HGB BLD-MCNC: 11.8 G/DL — LOW (ref 13–17)
MAGNESIUM SERPL-MCNC: 2 MG/DL — SIGNIFICANT CHANGE UP (ref 1.6–2.6)
MCHC RBC-ENTMCNC: 30.9 PG — SIGNIFICANT CHANGE UP (ref 27–34)
MCHC RBC-ENTMCNC: 34.3 GM/DL — SIGNIFICANT CHANGE UP (ref 32–36)
MCV RBC AUTO: 90.1 FL — SIGNIFICANT CHANGE UP (ref 80–100)
NRBC # BLD: 0 /100 WBCS — SIGNIFICANT CHANGE UP (ref 0–0)
PHOSPHATE SERPL-MCNC: 1.6 MG/DL — LOW (ref 2.5–4.5)
PLATELET # BLD AUTO: 181 K/UL — SIGNIFICANT CHANGE UP (ref 150–400)
POTASSIUM SERPL-MCNC: 3.5 MMOL/L — SIGNIFICANT CHANGE UP (ref 3.5–5.3)
POTASSIUM SERPL-SCNC: 3.5 MMOL/L — SIGNIFICANT CHANGE UP (ref 3.5–5.3)
RBC # BLD: 3.82 M/UL — LOW (ref 4.2–5.8)
RBC # FLD: 12.4 % — SIGNIFICANT CHANGE UP (ref 10.3–14.5)
SODIUM SERPL-SCNC: 139 MMOL/L — SIGNIFICANT CHANGE UP (ref 135–145)
SPECIMEN SOURCE: SIGNIFICANT CHANGE UP
SPECIMEN SOURCE: SIGNIFICANT CHANGE UP
WBC # BLD: 16.42 K/UL — HIGH (ref 3.8–10.5)
WBC # FLD AUTO: 16.42 K/UL — HIGH (ref 3.8–10.5)

## 2022-05-27 PROCEDURE — 70553 MRI BRAIN STEM W/O & W/DYE: CPT | Mod: 26

## 2022-05-27 PROCEDURE — 99233 SBSQ HOSP IP/OBS HIGH 50: CPT

## 2022-05-27 PROCEDURE — 70551 MRI BRAIN STEM W/O DYE: CPT | Mod: 26,59

## 2022-05-27 RX ORDER — HYDRALAZINE HCL 50 MG
25 TABLET ORAL EVERY 8 HOURS
Refills: 0 | Status: DISCONTINUED | OUTPATIENT
Start: 2022-05-27 | End: 2022-05-27

## 2022-05-27 RX ORDER — POTASSIUM PHOSPHATE, MONOBASIC POTASSIUM PHOSPHATE, DIBASIC 236; 224 MG/ML; MG/ML
30 INJECTION, SOLUTION INTRAVENOUS ONCE
Refills: 0 | Status: COMPLETED | OUTPATIENT
Start: 2022-05-27 | End: 2022-05-27

## 2022-05-27 RX ORDER — CLOPIDOGREL BISULFATE 75 MG/1
600 TABLET, FILM COATED ORAL ONCE
Refills: 0 | Status: COMPLETED | OUTPATIENT
Start: 2022-05-27 | End: 2022-05-27

## 2022-05-27 RX ADMIN — CANGRELOR 54.4 MICROGRAM(S)/KG/MIN: 50 INJECTION, POWDER, LYOPHILIZED, FOR SOLUTION INTRAVENOUS at 09:10

## 2022-05-27 RX ADMIN — Medication 1 TABLET(S): at 14:22

## 2022-05-27 RX ADMIN — CANGRELOR 54.4 MICROGRAM(S)/KG/MIN: 50 INJECTION, POWDER, LYOPHILIZED, FOR SOLUTION INTRAVENOUS at 19:45

## 2022-05-27 RX ADMIN — Medication 1 MILLIGRAM(S): at 14:22

## 2022-05-27 RX ADMIN — Medication 325 MILLIGRAM(S): at 14:22

## 2022-05-27 RX ADMIN — LATANOPROST 1 DROP(S): 0.05 SOLUTION/ DROPS OPHTHALMIC; TOPICAL at 21:50

## 2022-05-27 RX ADMIN — POTASSIUM PHOSPHATE, MONOBASIC POTASSIUM PHOSPHATE, DIBASIC 83.33 MILLIMOLE(S): 236; 224 INJECTION, SOLUTION INTRAVENOUS at 05:30

## 2022-05-27 RX ADMIN — CANGRELOR 54.4 MICROGRAM(S)/KG/MIN: 50 INJECTION, POWDER, LYOPHILIZED, FOR SOLUTION INTRAVENOUS at 18:01

## 2022-05-27 RX ADMIN — ATORVASTATIN CALCIUM 80 MILLIGRAM(S): 80 TABLET, FILM COATED ORAL at 21:50

## 2022-05-27 RX ADMIN — CLOPIDOGREL BISULFATE 600 MILLIGRAM(S): 75 TABLET, FILM COATED ORAL at 16:47

## 2022-05-27 NOTE — PROGRESS NOTE ADULT - ASSESSMENT
Summary:   Left supraclinoid ICA stenosis   Day 1 post left ICA stenting     NEURO:    q2h neuro checks   mg  Cangrelor gtt  MRI NOVA  Tylenol     CARDS:    -140  monitor for bradycardia   groin check, pulse check     PULM:    RA    RENAL:    IVl  I/O monitoring    GASTRO:    Regular diet  Stress ulcer prophylaxis:  not indicated    HEME:    monitor H/H    DVT prophylaxis: SCDs, hold anticoagulation    ENDO:   -180 mg/dl    ID:    Monitor for fever     Code status:  Full code  Disposition:  ICU    This patient was at high risk of neurologic deterioration and/or death due to: intracranial hemorrhage, ischemic stroke    Summary:   Left supraclinoid ICA stenosis   Day 1 post left ICA stenting     NEURO:    q2h neuro checks   mg  Cangrelor gtt, MRI NOVA, will transition to plavix   Tylenol   PT/OT/OBC     CARDS:    -140 mmhg off nicardipine   monitor for bradycardia   groin check, pulse check     PULM:    RA    RENAL:    IVl  I/O monitoring    GASTRO:    Regular diet  Stress ulcer prophylaxis:  not indicated    HEME:    monitor H/H    DVT prophylaxis: SCDs, hold anticoagulation    ENDO:   -180 mg/dl    ID:    Monitor for fever     Code status:  Full code  Disposition:  ICU    This patient was at high risk of neurologic deterioration and/or death due to: intracranial hemorrhage, ischemic stroke     not critical

## 2022-05-27 NOTE — PROGRESS NOTE ADULT - SUBJECTIVE AND OBJECTIVE BOX
HPI:  67 year old left-handed male with medical history of macular degeneration (no vision loss) and EtOH use (daily 6-7 beers/day, last drink 2 days ago) who presents as a transfer from [facility] for tertiary stroke treatment.  Family at bedside to corroborate story.  Patient flew back from [place] Saturday (5/21) morning and took a cab home.  He said he "didn't feel good".  Later in the day, he was speaking one word sentences.  This morning when he woke up, family noted that he could not complete a full sentence.  Initial CTH 5/22/2022 with subacute left MCA territory infarct in the frontal lobe.  CTA with severe focal narrowing of left supraclinoid ICA extending to the carotid terminus, as well as the diminished opacification of the left anterior cerebral artery.  CTP with large penumbra in left MCA territory with mismatch volume of 12.7cc.  Patient transferred to [facility].  Stroke score of 1 for mild non-fluent aphasia.    Day 1 post left ICA stenting     On admission, the patient was:  GCS: 15  NIHSS: 1  preMRS 0    OVERNIGHT EVENTS:   No acute events overnight.    VITALS:  T(C): , Max: 36.7 (05-26-22 @ 23:00)  HR:  (63 - 72)  BP:  (112/59 - 144/67)  ABP:  (105/39 - 157/57)  RR:  (14 - 18)  SpO2:  (91% - 100%)  Wt(kg): --      05-26-22 @ 07:01  -  05-27-22 @ 07:00  --------------------------------------------------------  IN: 2376.6 mL / OUT: 1035 mL / NET: 1341.6 mL    05-27-22 @ 07:01  -  05-27-22 @ 11:15  --------------------------------------------------------  IN: 413.1 mL / OUT: 0 mL / NET: 413.1 mL      LABS:  Na: 139 (05-27 @ 04:21), 141 (05-26 @ 05:43), 142 (05-25 @ 01:36)  K: 3.5 (05-27 @ 04:21), 3.4 (05-26 @ 05:43), 3.6 (05-25 @ 01:36)  Cl: 107 (05-27 @ 04:21), 104 (05-26 @ 05:43), 107 (05-25 @ 01:36)  CO2: 21 (05-27 @ 04:21), 23 (05-26 @ 05:43), 22 (05-25 @ 01:36)  BUN: 14 (05-27 @ 04:21), 11 (05-26 @ 05:43), 18 (05-25 @ 01:36)  Cr: 0.94 (05-27 @ 04:21), 0.92 (05-26 @ 05:43), 1.13 (05-25 @ 01:36)  Glu: 177(05-27 @ 04:21), 118(05-26 @ 05:43), 131(05-25 @ 01:36)    Hgb: 11.8 (05-27 @ 04:21), 14.6 (05-26 @ 05:43), 14.0 (05-25 @ 01:36)  Hct: 34.4 (05-27 @ 04:21), 41.7 (05-26 @ 05:43), 40.8 (05-25 @ 01:36)  WBC: 16.42 (05-27 @ 04:21), 11.69 (05-26 @ 05:43), 11.14 (05-25 @ 01:36)  Plt: 181 (05-27 @ 04:21), 185 (05-26 @ 05:43), 177 (05-25 @ 01:36)    INR:   PTT:     IMAGING:   Recent imaging studies were reviewed.    MEDICATIONS:  acetaminophen     Tablet .. 650 milliGRAM(s) Oral every 6 hours PRN  aspirin 325 milliGRAM(s) Oral daily  atorvastatin 80 milliGRAM(s) Oral at bedtime  cangrelor Infusion 2 MICROgram(s)/kG/Min IV Continuous <Continuous>  folic acid 1 milliGRAM(s) Oral daily  latanoprost 0.005% Ophthalmic Solution 1 Drop(s) Both EYES at bedtime  multivitamin 1 Tablet(s) Oral daily  niCARdipine Infusion 5 mG/Hr IV Continuous <Continuous>    EXAMINATION:  General:  calm  HEENT:  MMM  Neuro:  awake, alert, oriented x 3, follows commands, moves all extremities, right UE drift, 4/5  Cards:  RRR  Respiratory:  no respiratory distress  Adomen:  soft  Extremities:  no edema, no groin hematoma, normal pulses   Skin:  warm/dry   HPI:  67 year old left-handed male with medical history of macular degeneration (no vision loss) and EtOH use (daily 6-7 beers/day, last drink 2 days ago) who presents as a transfer from [facility] for tertiary stroke treatment.  Family at bedside to corroborate story.  Patient flew back from [place] Saturday (5/21) morning and took a cab home.  He said he "didn't feel good".  Later in the day, he was speaking one word sentences.  This morning when he woke up, family noted that he could not complete a full sentence.  Initial CTH 5/22/2022 with subacute left MCA territory infarct in the frontal lobe.  CTA with severe focal narrowing of left supraclinoid ICA extending to the carotid terminus, as well as the diminished opacification of the left anterior cerebral artery.  CTP with large penumbra in left MCA territory with mismatch volume of 12.7cc.  Patient transferred to [facility].  Stroke score of 1 for mild non-fluent aphasia.    Day 1 post left ICA stenting     On admission, the patient was:  GCS: 15  NIHSS: 1  preMRS 0    OVERNIGHT EVENTS:   remains on cangrelor drip     T(C): 36.5 (05-27-22 @ 08:00), Max: 36.7 (05-26-22 @ 23:00)  HR: 63 (05-27-22 @ 11:00) (63 - 72)  BP: 123/70 (05-27-22 @ 11:00) (112/59 - 144/67)  RR: 18 (05-27-22 @ 11:00) (14 - 18)  SpO2: 98% (05-27-22 @ 11:00) (91% - 98%)  05-26-22 @ 07:01  -  05-27-22 @ 07:00  --------------------------------------------------------  IN: 2376.6 mL / OUT: 1035 mL / NET: 1341.6 mL    05-27-22 @ 07:01  -  05-27-22 @ 12:59  --------------------------------------------------------  IN: 605.2 mL / OUT: 0 mL / NET: 605.2 mL    acetaminophen     Tablet .. 650 milliGRAM(s) Oral every 6 hours PRN  aspirin 325 milliGRAM(s) Oral daily  atorvastatin 80 milliGRAM(s) Oral at bedtime  cangrelor Infusion 2 MICROgram(s)/kG/Min IV Continuous <Continuous>  folic acid 1 milliGRAM(s) Oral daily  latanoprost 0.005% Ophthalmic Solution 1 Drop(s) Both EYES at bedtime  multivitamin 1 Tablet(s) Oral daily      LABS:  Na: 139 (05-27 @ 04:21), 141 (05-26 @ 05:43), 142 (05-25 @ 01:36)  K: 3.5 (05-27 @ 04:21), 3.4 (05-26 @ 05:43), 3.6 (05-25 @ 01:36)  Cl: 107 (05-27 @ 04:21), 104 (05-26 @ 05:43), 107 (05-25 @ 01:36)  CO2: 21 (05-27 @ 04:21), 23 (05-26 @ 05:43), 22 (05-25 @ 01:36)  BUN: 14 (05-27 @ 04:21), 11 (05-26 @ 05:43), 18 (05-25 @ 01:36)  Cr: 0.94 (05-27 @ 04:21), 0.92 (05-26 @ 05:43), 1.13 (05-25 @ 01:36)  Glu: 177(05-27 @ 04:21), 118(05-26 @ 05:43), 131(05-25 @ 01:36)    Hgb: 11.8 (05-27 @ 04:21), 14.6 (05-26 @ 05:43), 14.0 (05-25 @ 01:36)  Hct: 34.4 (05-27 @ 04:21), 41.7 (05-26 @ 05:43), 40.8 (05-25 @ 01:36)  WBC: 16.42 (05-27 @ 04:21), 11.69 (05-26 @ 05:43), 11.14 (05-25 @ 01:36)  Plt: 181 (05-27 @ 04:21), 185 (05-26 @ 05:43), 177 (05-25 @ 01:36)    INR:   PTT:     IMAGING:   Recent imaging studies were reviewed.      EXAMINATION:  General:  calm  HEENT:  MMM  Neuro:  awake, alert, oriented x 3, follows commands, moves all extremities, right UE drift, 4/5  Cards:  RRR  Respiratory:  no respiratory distress  Adomen:  soft  Extremities:  no edema, no groin hematoma, normal pulses   Skin:  warm/dry

## 2022-05-27 NOTE — PROGRESS NOTE ADULT - ASSESSMENT
ASSESSMENT:  Left supraclinoid ICA stenosis   Day 1 post left ICA stenting     PLAN:  NC q2, c/w Asa 325mg, received load plavix, cangrelor ggt hold for 10 mins and send P2y12 then restart   c/w atorvastatin 80mg   Feeding: [x] diet  Analgesia/Sedation: tylenol   Seizure prophylaxis: [x] not indicated  Thromboprophylaxis: [x] SCDs   Head of Bed/Activity: [] 30 degrees [] mobilize as tolerated [x] PT [] OT [] PMNR  Ulcer prophylaxis: [] not indicated [] PPI [] other:  Glucose Control: Goal -180 [] RISS [] other:

## 2022-05-27 NOTE — PROGRESS NOTE ADULT - SUBJECTIVE AND OBJECTIVE BOX
THE PATIENT WAS SEEN AND EXAMINED BY ME WITH THE HOUSESTAFF AND STROKE TEAM DURING MORNING ROUNDS.   HPI:  67 year old left-handed male with medical history of macular degeneration (no vision loss) and EtOH use (daily 6-7 beers/day, last drink 2 days ago) who presents as a transfer from [facility] for tertiary stroke treatment.  Family at bedside to corroborate story.  Patient flew back from [place] Saturday (5/21) morning and took a cab home.  He said he "didn't feel good".  Later in the day, he was speaking one word sentences.  On morning of admission when he woke up, family noted that he could not complete a full sentence.  Initial CTH 5/22/2022 with subacute left MCA territory infarct in the frontal lobe.  CTA with severe focal narrowing of left supraclinoid ICA extending to the carotid terminus, as well as the diminished opacification of the left anterior cerebral artery.  CTP with large penumbra in left MCA territory with mismatch volume of 12.7cc.  Patient transferred to [facility].  Stroke score of 1 for mild non-fluent aphasia. NIHSS 1 preMRS 0       SUBJECTIVE: No events overnight.  No new neurologic complaints.      acetaminophen     Tablet .. 650 milliGRAM(s) Oral every 6 hours PRN  aspirin 325 milliGRAM(s) Oral daily  atorvastatin 80 milliGRAM(s) Oral at bedtime  cangrelor Infusion 2 MICROgram(s)/kG/Min IV Continuous <Continuous>  folic acid 1 milliGRAM(s) Oral daily  latanoprost 0.005% Ophthalmic Solution 1 Drop(s) Both EYES at bedtime  multivitamin 1 Tablet(s) Oral daily      PHYSICAL EXAM:   Vital Signs Last 24 Hrs  T(C): 36.5 (27 May 2022 08:00), Max: 36.7 (26 May 2022 23:00)  T(F): 97.7 (27 May 2022 08:00), Max: 98.1 (26 May 2022 23:00)  HR: 63 (27 May 2022 11:00) (63 - 72)  BP: 123/70 (27 May 2022 11:00) (112/59 - 144/67)  BP(mean): 91 (27 May 2022 11:00) (81 - 100)  RR: 18 (27 May 2022 11:00) (14 - 18)  SpO2: 98% (27 May 2022 11:00) (91% - 98%)    General: No acute distress  HEENT: EOM intact, BTT less on R  Abdomen: Soft, nontender, nondistended   Extremities: No edema    NEUROLOGICAL EXAM:  Mental status: Eyes open, awake, alert, oriented to self, date and hospital, speech moderately dysfluent,  follows cross commands with some hesitancy; repetition intact  Cranial Nerves: No facial asymmetry, no nystagmus, mild dysarthria,  tongue midline  Motor exam: Normal tone, No dirft,  RUE distally 4/5, RLE 5/5,  decreased fine finger movements to right hand, LUE 5/5, LLE 5/5   Sensation: Intact to light touch   Coordination/ Gait: No dysmetria, gait deferred      LABS:                        11.8   16.42 )-----------( 181      ( 27 May 2022 04:21 )             34.4    05-27    139  |  107  |  14  ----------------------------<  177<H>  3.5   |  21<L>  |  0.94    Ca    8.4      27 May 2022 04:21  Phos  1.6     05-27  Mg     2.0     05-27          IMAGING: Reviewed by me.     MRI HEAD 05/26/22: Again identified is an acute/subacute  left MCA territory infarct.   There is no gross infarct extension or hemorrhagic conversion    MR Head/MRA Head and Neck (5/23/22): Acute left middle cerebral artery infarct similar to that predicted on the CT perfusion. No hemorrhage. Marked narrowing of the left supraclinoid internal carotid artery with diminished antegrade flow in the left anterior and middle cerebral arteries. No significant cross-filling from the right anterior communicating artery or the left posterior communicating artery. Markedly elevated flow in the left posterior cerebral artery suggesting chronicity with collateralization. Noninvasive flow MR angiography     5/22/22:  CT PERFUSION: Large ischemic penumbra in the left middle cerebral artery territory with mismatch volume of 12.7.    CTA COW:   Diminished opacification of the left internal carotid artery, with focal severe narrowing of the left supraclinoid ICA extending to the carotid terminus, as well as diminished opacification of the left anterior cerebral artery and diminished caliber and opacification of left middle cerebral artery branches, including markedly severe effacement of the left M1 segment. No vessel cut off.    CTA NECK:   Patent, ECAs, ICAs, although diffusely decreased caliber of the left internal carotid artery extending to the skull base, cannot exclude a dissection. Atherosclerotic plaque at both carotid bifurcations with less than 50% stenosis at the ICA origins by NASCET criteria. Bilateral vertebral arteries are patent without flow limiting stenosis.    CTH 5/22/22:  Subacute left middle cerebral artery territory infarct in the frontal lobe. No evidence for hemorrhagic conversion.   THE PATIENT WAS SEEN AND EXAMINED BY ME WITH THE HOUSESTAFF AND STROKE TEAM DURING MORNING ROUNDS.   HPI:  67 year old left-handed male with medical history of macular degeneration (no vision loss) and EtOH use (daily 6-7 beers/day, last drink 2 days ago) who presents as a transfer from [facility] for tertiary stroke treatment.  Family at bedside to corroborate story.  Patient flew back from [place] Saturday (5/21) morning and took a cab home.  He said he "didn't feel good".  Later in the day, he was speaking one word sentences.  On morning of admission when he woke up, family noted that he could not complete a full sentence.  Initial CTH 5/22/2022 with subacute left MCA territory infarct in the frontal lobe.  CTA with severe focal narrowing of left supraclinoid ICA extending to the carotid terminus, as well as the diminished opacification of the left anterior cerebral artery.  CTP with large penumbra in left MCA territory with mismatch volume of 12.7cc.  Patient transferred to [facility].  Stroke score of 1 for mild non-fluent aphasia. NIHSS 1 preMRS 0       SUBJECTIVE: No events overnight.  No new neurologic complaints.      acetaminophen     Tablet .. 650 milliGRAM(s) Oral every 6 hours PRN  aspirin 325 milliGRAM(s) Oral daily  atorvastatin 80 milliGRAM(s) Oral at bedtime  cangrelor Infusion 2 MICROgram(s)/kG/Min IV Continuous <Continuous>  folic acid 1 milliGRAM(s) Oral daily  latanoprost 0.005% Ophthalmic Solution 1 Drop(s) Both EYES at bedtime  multivitamin 1 Tablet(s) Oral daily      PHYSICAL EXAM:   Vital Signs Last 24 Hrs  T(C): 36.5 (27 May 2022 08:00), Max: 36.7 (26 May 2022 23:00)  T(F): 97.7 (27 May 2022 08:00), Max: 98.1 (26 May 2022 23:00)  HR: 63 (27 May 2022 11:00) (63 - 72)  BP: 123/70 (27 May 2022 11:00) (112/59 - 144/67)  BP(mean): 91 (27 May 2022 11:00) (81 - 100)  RR: 18 (27 May 2022 11:00) (14 - 18)  SpO2: 98% (27 May 2022 11:00) (91% - 98%)    General: No acute distress  HEENT: EOM intact, BTT less on R  Abdomen: Soft, nontender, nondistended   Extremities: No edema    NEUROLOGICAL EXAM:  Mental status: Eyes open, awake, alert, oriented to self, date and hospital, speech moderately dysfluent,  follows cross commands with some hesitancy; repetition intact  Cranial Nerves: No facial asymmetry, no nystagmus, mild dysarthria,  tongue midline  Motor exam: Normal tone, No drift  RUE deltoid 4/5, distally 5/5, RLE 5/5,  mildly slow fine finger movements to right hand, LUE 5/5, LLE 5/5   Sensation: Intact to light touch   Coordination/ Gait: No dysmetria, gait deferred      LABS:                        11.8   16.42 )-----------( 181      ( 27 May 2022 04:21 )             34.4    05-27    139  |  107  |  14  ----------------------------<  177<H>  3.5   |  21<L>  |  0.94    Ca    8.4      27 May 2022 04:21  Phos  1.6     05-27  Mg     2.0     05-27          IMAGING: Reviewed by me.     MRI HEAD 05/26/22: Again identified is an acute/subacute  left MCA territory infarct.   There is no gross infarct extension or hemorrhagic conversion    MR Head/MRA Head and Neck (5/23/22): Acute left middle cerebral artery infarct similar to that predicted on the CT perfusion. No hemorrhage. Marked narrowing of the left supraclinoid internal carotid artery with diminished antegrade flow in the left anterior and middle cerebral arteries. No significant cross-filling from the right anterior communicating artery or the left posterior communicating artery. Markedly elevated flow in the left posterior cerebral artery suggesting chronicity with collateralization. Noninvasive flow MR angiography     5/22/22:  CT PERFUSION: Large ischemic penumbra in the left middle cerebral artery territory with mismatch volume of 12.7.    CTA COW:   Diminished opacification of the left internal carotid artery, with focal severe narrowing of the left supraclinoid ICA extending to the carotid terminus, as well as diminished opacification of the left anterior cerebral artery and diminished caliber and opacification of left middle cerebral artery branches, including markedly severe effacement of the left M1 segment. No vessel cut off.    CTA NECK:   Patent, ECAs, ICAs, although diffusely decreased caliber of the left internal carotid artery extending to the skull base, cannot exclude a dissection. Atherosclerotic plaque at both carotid bifurcations with less than 50% stenosis at the ICA origins by NASCET criteria. Bilateral vertebral arteries are patent without flow limiting stenosis.    CTH 5/22/22:  Subacute left middle cerebral artery territory infarct in the frontal lobe. No evidence for hemorrhagic conversion.

## 2022-05-27 NOTE — PROGRESS NOTE ADULT - ASSESSMENT
67 year old left-handed male with medical history of macular degeneration (no vision loss) and EtOH use (daily 6-7 beers/day, last drink 2 days ago) who presents as a transfer from [facility] for tertiary stroke treatment.  Family at bedside to corroborate story.  Patient flew back from [place] Saturday (5/21) morning and took a cab home.  He said he "didn't feel good".  Later in the day, he was speaking one word sentences.  This morning when he woke up, family noted that he could not complete a full sentence.  Initial CTH 5/22/2022 with subacute left MCA territory infarct in the frontal lobe.  CTA with severe focal narrowing of left supraclinoid ICA extending to the carotid terminus, as well as the diminished opacification of the left anterior cerebral artery.  CTP with large penumbra in left MCA territory with mismatch volume of 12.7cc.  Patient transferred to [facility].  Stroke score of 1 for mild non-fluent aphasia.    Impression.  Mild-moderate or moderate transcortical motor aphasia, due to left hemispheric border zone infarction, with left supraclinoid ICA and M1 stenosis.  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     NEURO: Neuro exam worse morning of 05/26 now s/p POD # 1 left carotid stent for symptomatic severe carotid stenosis by Dr. Medina, exam now improved.  : started on high dose statin,   MRI Brain w/o, MRA Head and Neck w/o results as noted above. Physical therapy/OT recommending acute rehab.     ANTITHROMBOTIC THERAPY: Aspirin and Cangrelor drip     PULMONARY: CXR (05/25/) clear, Protecting airway, saturating well     CARDIOVASCULAR: TTE shows EF of 65%, Normal left ventricular systolic function. No segmental wall motion abnormalities, continue with cardiac monitoring.                              SBP goal: 110-140    GASTROINTESTINAL:  dysphagia screen passed, tolerating diet      Diet: Regular     RENAL: BUN/Cr without acute change, good urine output      Na Goal: Greater than 135     Newman: No    HEMATOLOGY: H/H without acute change, Platelets 181, no signs of bleeding. LE doppler (05/24): No evidence of DVT in either lower extremity.  DVT ppx: LMWH      ID: afebrile, Tmax 38, leukocytosis rising, infectious work up negative, ID following, consult appreciated will continue to monitor     OTHER: All questions and concerns addressed with patient bedside. Alcohol abuse counseling provided, will continue CIWA protocol     DISPOSITION: Acute Rehab as per PT/OT eval once stable and workup is complete.    CORE MEASURES:        Admission NIHSS: 1     TPA: [] YES [x] NO      LDL/HDL: 140/48     Depression Screen: p     Statin Therapy: y     Dysphagia Screen: [x] PASS [] FAIL     Smoking [] YES [x] NO      Afib [] YES [x] NO     Stroke Education [x] YES [] NO    Obtain screening lower extremity venous ultrasound in patients who meet 1 or more of the following criteria as patient is high risk for DVT/PE on admission:   [] History of DVT/PE  []Hypercoagulable states (Factor V Leiden, Cancer, OCP, etc. )  [x]Prolonged immobility (hemiplegia/hemiparesis/post operative or any other extended immobilization)- flight   [] Transferred from outside facility (Rehab or Long term care)  [] Age </= to 50.

## 2022-05-27 NOTE — PROGRESS NOTE ADULT - SUBJECTIVE AND OBJECTIVE BOX
NEUROCRITICAL CARE  EVENING NOTE    BRIEF SUMMARY:  67yMale presented with Patient is a 67y old  Male who presents with a chief complaint of Left MCA ischemic infarct (26 May 2022 15:46)        VITALS/IMAGING/DATA/IVF FLUIDS/MEDICATIONS: [x] Reviewed    ALLERGIES: Allergies  No Known Allergies  Intolerances        EXAMINATION:  General: No acute distress  HEENT: Anicteric sclerae,    Cardiac: [id number]  Lungs: Clear  Abdomen: Soft, non-tender, +BS  Extremities: No c/c/e  Skin/Incision Site: Clean, dry and intact  Neurologic: Awake, alert, fully oriented, follows commands, EOMI, face symmetric, tongue midline, no drift, RUE 4+/5 (baseline)

## 2022-05-28 LAB
ANION GAP SERPL CALC-SCNC: 8 MMOL/L — SIGNIFICANT CHANGE UP (ref 5–17)
BUN SERPL-MCNC: 15 MG/DL — SIGNIFICANT CHANGE UP (ref 7–23)
CALCIUM SERPL-MCNC: 8.5 MG/DL — SIGNIFICANT CHANGE UP (ref 8.4–10.5)
CHLORIDE SERPL-SCNC: 110 MMOL/L — HIGH (ref 96–108)
CO2 SERPL-SCNC: 22 MMOL/L — SIGNIFICANT CHANGE UP (ref 22–31)
CREAT SERPL-MCNC: 0.79 MG/DL — SIGNIFICANT CHANGE UP (ref 0.5–1.3)
EGFR: 96 ML/MIN/1.73M2 — SIGNIFICANT CHANGE UP
GLUCOSE SERPL-MCNC: 123 MG/DL — HIGH (ref 70–99)
MAGNESIUM SERPL-MCNC: 2.1 MG/DL — SIGNIFICANT CHANGE UP (ref 1.6–2.6)
PA ADP PRP-ACNC: 173 PRU — LOW (ref 194–417)
PA ADP PRP-ACNC: 254 PRU — SIGNIFICANT CHANGE UP (ref 194–417)
PA ADP PRP-ACNC: 92 PRU — LOW (ref 194–417)
PHOSPHATE SERPL-MCNC: 1.9 MG/DL — LOW (ref 2.5–4.5)
POTASSIUM SERPL-MCNC: 3.7 MMOL/L — SIGNIFICANT CHANGE UP (ref 3.5–5.3)
POTASSIUM SERPL-SCNC: 3.7 MMOL/L — SIGNIFICANT CHANGE UP (ref 3.5–5.3)
SODIUM SERPL-SCNC: 140 MMOL/L — SIGNIFICANT CHANGE UP (ref 135–145)

## 2022-05-28 PROCEDURE — 99233 SBSQ HOSP IP/OBS HIGH 50: CPT

## 2022-05-28 RX ORDER — LABETALOL HCL 100 MG
5 TABLET ORAL ONCE
Refills: 0 | Status: COMPLETED | OUTPATIENT
Start: 2022-05-28 | End: 2022-05-28

## 2022-05-28 RX ORDER — CLOPIDOGREL BISULFATE 75 MG/1
75 TABLET, FILM COATED ORAL DAILY
Refills: 0 | Status: DISCONTINUED | OUTPATIENT
Start: 2022-05-28 | End: 2022-05-28

## 2022-05-28 RX ORDER — ENOXAPARIN SODIUM 100 MG/ML
40 INJECTION SUBCUTANEOUS
Refills: 0 | Status: DISCONTINUED | OUTPATIENT
Start: 2022-05-28 | End: 2022-06-02

## 2022-05-28 RX ORDER — TICAGRELOR 90 MG/1
60 TABLET ORAL
Refills: 0 | Status: DISCONTINUED | OUTPATIENT
Start: 2022-05-29 | End: 2022-06-02

## 2022-05-28 RX ORDER — SENNA PLUS 8.6 MG/1
2 TABLET ORAL AT BEDTIME
Refills: 0 | Status: DISCONTINUED | OUTPATIENT
Start: 2022-05-28 | End: 2022-06-02

## 2022-05-28 RX ORDER — POLYETHYLENE GLYCOL 3350 17 G/17G
17 POWDER, FOR SOLUTION ORAL
Refills: 0 | Status: DISCONTINUED | OUTPATIENT
Start: 2022-05-28 | End: 2022-06-02

## 2022-05-28 RX ORDER — TICAGRELOR 90 MG/1
60 TABLET ORAL EVERY 12 HOURS
Refills: 0 | Status: DISCONTINUED | OUTPATIENT
Start: 2022-05-28 | End: 2022-05-28

## 2022-05-28 RX ORDER — CANGRELOR 50 MG/1
2 INJECTION, POWDER, LYOPHILIZED, FOR SOLUTION INTRAVENOUS
Qty: 50 | Refills: 0 | Status: DISCONTINUED | OUTPATIENT
Start: 2022-05-28 | End: 2022-05-28

## 2022-05-28 RX ORDER — TICAGRELOR 90 MG/1
180 TABLET ORAL ONCE
Refills: 0 | Status: COMPLETED | OUTPATIENT
Start: 2022-05-28 | End: 2022-05-28

## 2022-05-28 RX ADMIN — Medication 5 MILLIGRAM(S): at 20:26

## 2022-05-28 RX ADMIN — Medication 1 TABLET(S): at 12:33

## 2022-05-28 RX ADMIN — SENNA PLUS 2 TABLET(S): 8.6 TABLET ORAL at 21:47

## 2022-05-28 RX ADMIN — ENOXAPARIN SODIUM 40 MILLIGRAM(S): 100 INJECTION SUBCUTANEOUS at 17:19

## 2022-05-28 RX ADMIN — Medication 325 MILLIGRAM(S): at 12:33

## 2022-05-28 RX ADMIN — CANGRELOR 54.4 MICROGRAM(S)/KG/MIN: 50 INJECTION, POWDER, LYOPHILIZED, FOR SOLUTION INTRAVENOUS at 12:34

## 2022-05-28 RX ADMIN — CANGRELOR 54.4 MICROGRAM(S)/KG/MIN: 50 INJECTION, POWDER, LYOPHILIZED, FOR SOLUTION INTRAVENOUS at 01:44

## 2022-05-28 RX ADMIN — CLOPIDOGREL BISULFATE 75 MILLIGRAM(S): 75 TABLET, FILM COATED ORAL at 06:59

## 2022-05-28 RX ADMIN — CANGRELOR 54.4 MICROGRAM(S)/KG/MIN: 50 INJECTION, POWDER, LYOPHILIZED, FOR SOLUTION INTRAVENOUS at 05:40

## 2022-05-28 RX ADMIN — Medication 1 MILLIGRAM(S): at 12:33

## 2022-05-28 RX ADMIN — ATORVASTATIN CALCIUM 80 MILLIGRAM(S): 80 TABLET, FILM COATED ORAL at 21:47

## 2022-05-28 RX ADMIN — LATANOPROST 1 DROP(S): 0.05 SOLUTION/ DROPS OPHTHALMIC; TOPICAL at 22:37

## 2022-05-28 RX ADMIN — Medication 5 MILLIGRAM(S): at 22:34

## 2022-05-28 RX ADMIN — TICAGRELOR 180 MILLIGRAM(S): 90 TABLET ORAL at 13:00

## 2022-05-28 NOTE — PROGRESS NOTE ADULT - ASSESSMENT
ASSESSMENT:  Left supraclinoid ICA stenosis   Day 2 post left ICA stenting     PLAN:  NC q2, c/w Asa 325mg, c/w ticagrelor 60mg BID   c/w atorvastatin 80mg   stroke following and accepted - pending stroke unit bed  Feeding: [x] diet  Analgesia/Sedation: tylenol   Seizure prophylaxis: [x] not indicated  Thromboprophylaxis: [x] SCDs   Head of Bed/Activity: [] 30 degrees [] mobilize as tolerated [x] PT [] OT [] PMNR  Ulcer prophylaxis: [] not indicated [] PPI [] other:  Glucose Control: Goal -180 [] RISS [] other:   ASSESSMENT:  Left supraclinoid ICA stenosis   Day 2 post left ICA stenting     PLAN:  NC q2, VS q2   NC q2, c/w Asa 325mg, c/w ticagrelor 60mg BID   c/w atorvastatin 80mg   stroke following and accepted - pending stroke unit bed  Feeding: [x] diet  Analgesia/Sedation: tylenol   Seizure prophylaxis: [x] not indicated  Thromboprophylaxis: [x] SCDs   Head of Bed/Activity: [] 30 degrees [] mobilize as tolerated [x] PT [] OT [] PMNR  Ulcer prophylaxis: [] not indicated [] PPI [] other:  Glucose Control: Goal -180 [] RISS [] other:

## 2022-05-28 NOTE — PROGRESS NOTE ADULT - SUBJECTIVE AND OBJECTIVE BOX
THE PATIENT WAS SEEN AND EXAMINED BY ME WITH THE HOUSESTAFF AND STROKE TEAM DURING MORNING ROUNDS.   HPI:  67 year old left-handed male with medical history of macular degeneration (no vision loss) and EtOH use (daily 6-7 beers/day, last drink 2 days ago) who presents as a transfer from [facility] for tertiary stroke treatment.  Family at bedside to corroborate story.  Patient flew back from [place] Saturday (5/21) morning and took a cab home.  He said he "didn't feel good".  Later in the day, he was speaking one word sentences.  On morning of admission when he woke up, family noted that he could not complete a full sentence.  Initial CTH 5/22/2022 with subacute left MCA territory infarct in the frontal lobe.  CTA with severe focal narrowing of left supraclinoid ICA extending to the carotid terminus, as well as the diminished opacification of the left anterior cerebral artery.  CTP with large penumbra in left MCA territory with mismatch volume of 12.7cc.  Patient transferred to [facility].  Stroke score of 1 for mild non-fluent aphasia. NIHSS 1 preMRS 0       SUBJECTIVE: No events overnight.  No new neurologic complaints, ROS reported negative unless otherwise noted.      acetaminophen     Tablet .. 650 milliGRAM(s) Oral every 6 hours PRN  aspirin 325 milliGRAM(s) Oral daily  atorvastatin 80 milliGRAM(s) Oral at bedtime  cangrelor Infusion 2 MICROgram(s)/kG/Min IV Continuous <Continuous>  enoxaparin Injectable 40 milliGRAM(s) SubCutaneous <User Schedule>  folic acid 1 milliGRAM(s) Oral daily  latanoprost 0.005% Ophthalmic Solution 1 Drop(s) Both EYES at bedtime  multivitamin 1 Tablet(s) Oral daily      PHYSICAL EXAM:   Vital Signs Last 24 Hrs  T(C): 36.9 (28 May 2022 15:00), Max: 36.9 (27 May 2022 23:00)  T(F): 98.4 (28 May 2022 15:00), Max: 98.4 (27 May 2022 23:00)  HR: 58 (28 May 2022 15:00) (52 - 73)  BP: 142/59 (28 May 2022 15:00) (105/89 - 157/66)  BP(mean): 77 (28 May 2022 15:00) (68 - 112)  RR: 15 (28 May 2022 15:00) (12 - 21)  SpO2: 99% (28 May 2022 15:00) (96% - 100%)    General: No acute distress  HEENT: EOM intact, BTT less on R  Abdomen: Soft, nontender, nondistended   Extremities: No edema    NEUROLOGICAL EXAM:  Mental status: Eyes open, awake, alert, oriented to self, date and hospital, speech moderately dysfluent,  follows cross commands, repetition intact  Cranial Nerves: No facial asymmetry, no nystagmus, mild dysarthria,  tongue midline  Motor exam: Normal tone, No drift  Right hemiparesis RUE deltoid 4/5, distally 5/5, RLE 5/5,  mildly slow fine finger movements to right hand, LUE 5/5, LLE 5/5   Sensation: Intact to light touch   Coordination/ Gait: No dysmetria, gait deferred     LABS:                        11.8   16.42 )-----------( 181      ( 27 May 2022 04:21 )             34.4    05-28    140  |  110<H>  |  15  ----------------------------<  123<H>  3.7   |  22  |  0.79    Ca    8.5      28 May 2022 13:06  Phos  1.9     05-28  Mg     2.1     05-28      IMAGING: Reviewed by me.     MRI HEAD 05/26/22: Again identified is an acute/subacute  left MCA territory infarct.   There is no gross infarct extension or hemorrhagic conversion    MR Head/MRA Head and Neck (5/23/22): Acute left middle cerebral artery infarct similar to that predicted on the CT perfusion. No hemorrhage. Marked narrowing of the left supraclinoid internal carotid artery with diminished antegrade flow in the left anterior and middle cerebral arteries. No significant cross-filling from the right anterior communicating artery or the left posterior communicating artery. Markedly elevated flow in the left posterior cerebral artery suggesting chronicity with collateralization. Noninvasive flow MR angiography     5/22/22:  CT PERFUSION: Large ischemic penumbra in the left middle cerebral artery territory with mismatch volume of 12.7.    CTA COW:   Diminished opacification of the left internal carotid artery, with focal severe narrowing of the left supraclinoid ICA extending to the carotid terminus, as well as diminished opacification of the left anterior cerebral artery and diminished caliber and opacification of left middle cerebral artery branches, including markedly severe effacement of the left M1 segment. No vessel cut off.    CTA NECK:   Patent, ECAs, ICAs, although diffusely decreased caliber of the left internal carotid artery extending to the skull base, cannot exclude a dissection. Atherosclerotic plaque at both carotid bifurcations with less than 50% stenosis at the ICA origins by NASCET criteria. Bilateral vertebral arteries are patent without flow limiting stenosis.    CTH 5/22/22:  Subacute left middle cerebral artery territory infarct in the frontal lobe. No evidence for hemorrhagic conversion. THE PATIENT WAS SEEN AND EXAMINED BY ME WITH THE HOUSESTAFF AND STROKE TEAM DURING MORNING ROUNDS.   HPI:  67 year old left-handed male with medical history of macular degeneration (no vision loss) and EtOH use (daily 6-7 beers/day, last drink 2 days ago) who presents as a transfer from [facility] for tertiary stroke treatment.  Family at bedside to corroborate story.  Patient flew back from [place] Saturday (5/21) morning and took a cab home.  He said he "didn't feel good".  Later in the day, he was speaking one word sentences.  On morning of admission when he woke up, family noted that he could not complete a full sentence.  Initial CTH 5/22/2022 with subacute left MCA territory infarct in the frontal lobe.  CTA with severe focal narrowing of left supraclinoid ICA extending to the carotid terminus, as well as the diminished opacification of the left anterior cerebral artery.  CTP with large penumbra in left MCA territory with mismatch volume of 12.7cc.  Patient transferred to [facility].  Stroke score of 1 for mild non-fluent aphasia. NIHSS 1 preMRS 0       SUBJECTIVE: No events overnight.  No new neurologic complaints, ROS reported negative unless otherwise noted.      acetaminophen     Tablet .. 650 milliGRAM(s) Oral every 6 hours PRN  aspirin 325 milliGRAM(s) Oral daily  atorvastatin 80 milliGRAM(s) Oral at bedtime  cangrelor Infusion 2 MICROgram(s)/kG/Min IV Continuous <Continuous>  enoxaparin Injectable 40 milliGRAM(s) SubCutaneous <User Schedule>  folic acid 1 milliGRAM(s) Oral daily  latanoprost 0.005% Ophthalmic Solution 1 Drop(s) Both EYES at bedtime  multivitamin 1 Tablet(s) Oral daily      PHYSICAL EXAM:   Vital Signs Last 24 Hrs  T(C): 36.9 (28 May 2022 15:00), Max: 36.9 (27 May 2022 23:00)  T(F): 98.4 (28 May 2022 15:00), Max: 98.4 (27 May 2022 23:00)  HR: 58 (28 May 2022 15:00) (52 - 73)  BP: 142/59 (28 May 2022 15:00) (105/89 - 157/66)  BP(mean): 77 (28 May 2022 15:00) (68 - 112)  RR: 15 (28 May 2022 15:00) (12 - 21)  SpO2: 99% (28 May 2022 15:00) (96% - 100%)    General: No acute distress  HEENT: EOM intact, BTT less on R  Abdomen: Soft, nontender, nondistended   Extremities: No edema    NEUROLOGICAL EXAM:  Mental status: Eyes open, awake, alert, oriented to self, date and hospital, speech moderately dysfluent,  follows cross commands, repetition intact  Cranial Nerves: No facial asymmetry, no nystagmus, mild dysarthria,  tongue midline  Motor exam: Normal tone, No drift  Right hemiparesis RUE deltoid 4/5, distally 5/5, RLE 5/5,  mildly slow fine finger movements to right hand, LUE 5/5, LLE 5/5   Sensation: Intact to light touch   Coordination/ Gait: No dysmetria, gait deferred     LABS:                        11.8   16.42 )-----------( 181      ( 27 May 2022 04:21 )             34.4    05-28    140  |  110<H>  |  15  ----------------------------<  123<H>  3.7   |  22  |  0.79    Ca    8.5      28 May 2022 13:06  Phos  1.9     05-28  Mg     2.1     05-28      IMAGING: Reviewed by me.     MRI HEAD 05/26/22: Again identified is an acute/subacute  left MCA territory infarct. There is no gross infarct extension or hemorrhagic conversion    MR Head/MRA Head and Neck (5/23/22): Acute left middle cerebral artery infarct similar to that predicted on the CT perfusion. No hemorrhage. Marked narrowing of the left supraclinoid internal carotid artery with diminished antegrade flow in the left anterior and middle cerebral arteries. No significant cross-filling from the right anterior communicating artery or the left posterior communicating artery. Markedly elevated flow in the left posterior cerebral artery suggesting chronicity with collateralization. Noninvasive flow MR angiography     5/22/22:  CT PERFUSION: Large ischemic penumbra in the left middle cerebral artery territory with mismatch volume of 12.7.    CTA COW:   Diminished opacification of the left internal carotid artery, with focal severe narrowing of the left supraclinoid ICA extending to the carotid terminus, as well as diminished opacification of the left anterior cerebral artery and diminished caliber and opacification of left middle cerebral artery branches, including markedly severe effacement of the left M1 segment. No vessel cut off.    CTA NECK:   Patent, ECAs, ICAs, although diffusely decreased caliber of the left internal carotid artery extending to the skull base, cannot exclude a dissection. Atherosclerotic plaque at both carotid bifurcations with less than 50% stenosis at the ICA origins by NASCET criteria. Bilateral vertebral arteries are patent without flow limiting stenosis.    CTH 5/22/22:  Subacute left middle cerebral artery territory infarct in the frontal lobe. No evidence for hemorrhagic conversion. THE PATIENT WAS SEEN AND EXAMINED BY ME WITH THE HOUSESTAFF AND STROKE TEAM DURING MORNING ROUNDS.   HPI:  67 year old left-handed male with medical history of macular degeneration (no vision loss) and EtOH use (daily 6-7 beers/day, last drink 2 days ago) who presents as a transfer from [facility] for tertiary stroke treatment.  Family at bedside to corroborate story.  Patient flew back from [place] Saturday (5/21) morning and took a cab home.  He said he "didn't feel good".  Later in the day, he was speaking one word sentences.  On morning of admission when he woke up, family noted that he could not complete a full sentence.  Initial CTH 5/22/2022 with subacute left MCA territory infarct in the frontal lobe.  CTA with severe focal narrowing of left supraclinoid ICA extending to the carotid terminus, as well as the diminished opacification of the left anterior cerebral artery.  CTP with large penumbra in left MCA territory with mismatch volume of 12.7cc.  Patient transferred to [facility].  Stroke score of 1 for mild non-fluent aphasia. NIHSS 1 preMRS 0       SUBJECTIVE: No events overnight.  No new neurologic complaints, ROS reported negative unless otherwise noted.      acetaminophen     Tablet .. 650 milliGRAM(s) Oral every 6 hours PRN  aspirin 325 milliGRAM(s) Oral daily  atorvastatin 80 milliGRAM(s) Oral at bedtime  cangrelor Infusion 2 MICROgram(s)/kG/Min IV Continuous <Continuous>  enoxaparin Injectable 40 milliGRAM(s) SubCutaneous <User Schedule>  folic acid 1 milliGRAM(s) Oral daily  latanoprost 0.005% Ophthalmic Solution 1 Drop(s) Both EYES at bedtime  multivitamin 1 Tablet(s) Oral daily      PHYSICAL EXAM:   Vital Signs Last 24 Hrs  T(C): 36.9 (28 May 2022 15:00), Max: 36.9 (27 May 2022 23:00)  T(F): 98.4 (28 May 2022 15:00), Max: 98.4 (27 May 2022 23:00)  HR: 58 (28 May 2022 15:00) (52 - 73)  BP: 142/59 (28 May 2022 15:00) (105/89 - 157/66)  BP(mean): 77 (28 May 2022 15:00) (68 - 112)  RR: 15 (28 May 2022 15:00) (12 - 21)  SpO2: 99% (28 May 2022 15:00) (96% - 100%)    General: No acute distress  HEENT: EOM intact, BTT less on R  Abdomen: Soft, nontender, nondistended   Extremities: No edema    NEUROLOGICAL EXAM:  Mental status: Eyes open, awake, alert, oriented to self, date and hospital, speech moderately dysfluent,  follows cross commands, repetition intact  Cranial Nerves: No facial asymmetry, no nystagmus, mild dysarthria,  tongue midline  Motor exam: Normal tone, No drift  Right hemiparesis RUE deltoid 4/5, distally 5/5, RLE 5/5,  mildly slow fine finger movements to right hand, LUE 5/5, LLE 5/5   Sensation: Intact to light touch   Coordination/ Gait: No dysmetria, gait deferred     LABS:                        11.8   16.42 )-----------( 181      ( 27 May 2022 04:21 )             34.4    05-28    140  |  110<H>  |  15  ----------------------------<  123<H>  3.7   |  22  |  0.79    Ca    8.5      28 May 2022 13:06  Phos  1.9     05-28  Mg     2.1     05-28      IMAGING: Reviewed by me.     MRI Head (05/27): No change in left frontal infarct compared with 5/26/2022. Improved flow in the left internal carotid artery and left anterior cerebral artery compared with 5/23/2022. Only mildly improved flow in the left MCA is related to the presence of a left M1 stenosis.    MRI HEAD 05/26/22 Post angioplasty and stenting: Again identified is an acute/subacute  left MCA territory infarct. There is no gross infarct extension or hemorrhagic conversion    MR Head/MRA Head and Neck (5/23/22): Acute left middle cerebral artery infarct similar to that predicted on the CT perfusion. No hemorrhage. Marked narrowing of the left supraclinoid internal carotid artery with diminished antegrade flow in the left anterior and middle cerebral arteries. No significant cross-filling from the right anterior communicating artery or the left posterior communicating artery. Markedly elevated flow in the left posterior cerebral artery suggesting chronicity with collateralization. Noninvasive flow MR angiography     5/22/22:  CT PERFUSION: Large ischemic penumbra in the left middle cerebral artery territory with mismatch volume of 12.7.    CTA COW:   Diminished opacification of the left internal carotid artery, with focal severe narrowing of the left supraclinoid ICA extending to the carotid terminus, as well as diminished opacification of the left anterior cerebral artery and diminished caliber and opacification of left middle cerebral artery branches, including markedly severe effacement of the left M1 segment. No vessel cut off.    CTA NECK:   Patent, ECAs, ICAs, although diffusely decreased caliber of the left internal carotid artery extending to the skull base, cannot exclude a dissection. Atherosclerotic plaque at both carotid bifurcations with less than 50% stenosis at the ICA origins by NASCET criteria. Bilateral vertebral arteries are patent without flow limiting stenosis.    CTH 5/22/22:  Subacute left middle cerebral artery territory infarct in the frontal lobe. No evidence for hemorrhagic conversion.

## 2022-05-28 NOTE — PROGRESS NOTE ADULT - SUBJECTIVE AND OBJECTIVE BOX
HPI:  67 year old left-handed male with medical history of macular degeneration (no vision loss) and EtOH use (daily 6-7 beers/day, last drink 2 days ago) who presents as a transfer from [facility] for tertiary stroke treatment.  Family at bedside to corroborate story.  Patient flew back from [place] Saturday (5/21) morning and took a cab home.  He said he "didn't feel good".  Later in the day, he was speaking one word sentences.  This morning when he woke up, family noted that he could not complete a full sentence.  Initial CTH 5/22/2022 with subacute left MCA territory infarct in the frontal lobe.  CTA with severe focal narrowing of left supraclinoid ICA extending to the carotid terminus, as well as the diminished opacification of the left anterior cerebral artery.  CTP with large penumbra in left MCA territory with mismatch volume of 12.7cc.  Patient transferred to [facility].  Stroke score of 1 for mild non-fluent aphasia.    Day 2 post left ICA stenting     OVERNIGHT EVENTS:   No acute events overnight.    VITALS:  T(C): , Max: 36.9 (05-27-22 @ 23:00)  HR:  (54 - 73)  BP:  (105/89 - 144/70)  ABP:  (131/48 - 139/60)  RR:  (12 - 20)  SpO2:  (95% - 100%)  Wt(kg): --      05-27-22 @ 07:01  -  05-28-22 @ 07:00  --------------------------------------------------------  IN: 1950 mL / OUT: 850 mL / NET: 1100 mL      LABS:  Na: 139 (05-27 @ 04:21), 141 (05-26 @ 05:43)  K: 3.5 (05-27 @ 04:21), 3.4 (05-26 @ 05:43)  Cl: 107 (05-27 @ 04:21), 104 (05-26 @ 05:43)  CO2: 21 (05-27 @ 04:21), 23 (05-26 @ 05:43)  BUN: 14 (05-27 @ 04:21), 11 (05-26 @ 05:43)  Cr: 0.94 (05-27 @ 04:21), 0.92 (05-26 @ 05:43)  Glu: 177(05-27 @ 04:21), 118(05-26 @ 05:43)    Hgb: 11.8 (05-27 @ 04:21), 14.6 (05-26 @ 05:43)  Hct: 34.4 (05-27 @ 04:21), 41.7 (05-26 @ 05:43)  WBC: 16.42 (05-27 @ 04:21), 11.69 (05-26 @ 05:43)  Plt: 181 (05-27 @ 04:21), 185 (05-26 @ 05:43)    INR:   PTT:     IMAGING:   Recent imaging studies were reviewed.    MEDICATIONS:  acetaminophen     Tablet .. 650 milliGRAM(s) Oral every 6 hours PRN  aspirin 325 milliGRAM(s) Oral daily  atorvastatin 80 milliGRAM(s) Oral at bedtime  clopidogrel Tablet 75 milliGRAM(s) Oral daily  folic acid 1 milliGRAM(s) Oral daily  latanoprost 0.005% Ophthalmic Solution 1 Drop(s) Both EYES at bedtime  multivitamin 1 Tablet(s) Oral daily    EXAMINATION:  General:  calm  HEENT:  MMM  Neuro:  awake, alert, oriented x 3, follows commands, moves all extremities, right UE drift, 4/5  Cards:  RRR  Respiratory:  no respiratory distress  Adomen:  soft  Extremities:  no edema, no groin hematoma, normal pulses   Skin:  warm/dry   HPI:  67 year old left-handed male with medical history of macular degeneration (no vision loss) and EtOH use (daily 6-7 beers/day, last drink 2 days ago) who presents as a transfer from [facility] for tertiary stroke treatment.  Family at bedside to corroborate story.  Patient flew back from [place] Saturday (5/21) morning and took a cab home.  He said he "didn't feel good".  Later in the day, he was speaking one word sentences.  This morning when he woke up, family noted that he could not complete a full sentence.  Initial CTH 5/22/2022 with subacute left MCA territory infarct in the frontal lobe.  CTA with severe focal narrowing of left supraclinoid ICA extending to the carotid terminus, as well as the diminished opacification of the left anterior cerebral artery.  CTP with large penumbra in left MCA territory with mismatch volume of 12.7cc.  Patient transferred to [facility].  Stroke score of 1 for mild non-fluent aphasia.    Day 2 post left ICA stenting     PAST MEDICAL & SURGICAL HISTORY:  Glaucoma      Osteoarthritis      Knee pain, left  s/p TKR 8/15      Elevated liver enzymes  s/p tylenol use after TKR    Allergies    No Known Allergies    Intolerances      S/P knee surgery  left knee at age 15      S/P inguinal hernia repair  right      Status post total left knee replacement  8/2015      S/P colonoscopy with polypectomy  benign, 2 years ago              REVIEW OF SYSTEMS: [ ] Unable to Assess due to neurologic exam   [x ] All ROS addressed below are non-contributory, except:  Neuro: [ ] Headache [ ] Back pain [ ] Numbness [ ] Weakness [ ] Ataxia [ ] Dizziness [ ] Aphasia [ ] Dysarthria [ ] Visual disturbance  Resp: [ ] Shortness of breath/dyspnea, [ ] Orthopnea [ ] Cough  CV: [ ] Chest pain [ ] Palpitation [ ] Lightheadedness [ ] Syncope  Renal: [ ] Thirst [ ] Edema  GI: [ ] Nausea [ ] Emesis [ ] Abdominal pain [ ] Constipation [ ] Diarrhea  Hem: [ ] Hematemesis [ ] bright red blood per rectum  ID: [ ] Fever [ ] Chills [ ] Dysuria  ENT: [ ] Rhinorrhea          OVERNIGHT EVENTS:   cangrelor discontinued     T(C): 36.6 (05-28-22 @ 07:00), Max: 36.9 (05-27-22 @ 23:00)  HR: 62 (05-28-22 @ 09:00) (54 - 73)  BP: 140/73 (05-28-22 @ 09:00) (105/89 - 144/70)  RR: 18 (05-28-22 @ 09:00) (12 - 20)  SpO2: 97% (05-28-22 @ 09:00) (97% - 100%)  05-27-22 @ 07:01  -  05-28-22 @ 07:00  --------------------------------------------------------  IN: 1950 mL / OUT: 1400 mL / NET: 550 mL    acetaminophen     Tablet .. 650 milliGRAM(s) Oral every 6 hours PRN  aspirin 325 milliGRAM(s) Oral daily  atorvastatin 80 milliGRAM(s) Oral at bedtime  clopidogrel Tablet 75 milliGRAM(s) Oral daily  folic acid 1 milliGRAM(s) Oral daily  latanoprost 0.005% Ophthalmic Solution 1 Drop(s) Both EYES at bedtime  multivitamin 1 Tablet(s) Oral daily      EXAMINATION:  General:  calm  HEENT:  MMM  Neuro:  awake, alert, oriented x 3, follows commands, moves all extremities, right UE drift, 4/5  Cards:  RRR  Respiratory:  no respiratory distress  Adomen:  soft  Extremities:  no edema, no groin hematoma, normal pulses   Skin:  warm/dry        LABS:  Na: 139 (05-27 @ 04:21), 141 (05-26 @ 05:43)  K: 3.5 (05-27 @ 04:21), 3.4 (05-26 @ 05:43)  Cl: 107 (05-27 @ 04:21), 104 (05-26 @ 05:43)  CO2: 21 (05-27 @ 04:21), 23 (05-26 @ 05:43)  BUN: 14 (05-27 @ 04:21), 11 (05-26 @ 05:43)  Cr: 0.94 (05-27 @ 04:21), 0.92 (05-26 @ 05:43)  Glu: 177(05-27 @ 04:21), 118(05-26 @ 05:43)    Hgb: 11.8 (05-27 @ 04:21), 14.6 (05-26 @ 05:43)  Hct: 34.4 (05-27 @ 04:21), 41.7 (05-26 @ 05:43)  WBC: 16.42 (05-27 @ 04:21), 11.69 (05-26 @ 05:43)  Plt: 181 (05-27 @ 04:21), 185 (05-26 @ 05:43)    INR:   PTT:

## 2022-05-28 NOTE — PROGRESS NOTE ADULT - ASSESSMENT
67 year old left-handed male with medical history of macular degeneration (no vision loss) and EtOH use (daily 6-7 beers/day, last drink 2 days ago) who presents as a transfer from [facility] for tertiary stroke treatment.  Family at bedside to corroborate story.  Patient flew back from [place] Saturday (5/21) morning and took a cab home.  He said he "didn't feel good".  Later in the day, he was speaking one word sentences.  This morning when he woke up, family noted that he could not complete a full sentence.  Initial CTH 5/22/2022 with subacute left MCA territory infarct in the frontal lobe.  CTA with severe focal narrowing of left supraclinoid ICA extending to the carotid terminus, as well as the diminished opacification of the left anterior cerebral artery.  CTP with large penumbra in left MCA territory with mismatch volume of 12.7cc.  Patient transferred to [facility].  Stroke score of 1 for mild non-fluent aphasia.    Impression.  Mild-moderate or moderate transcortical motor aphasia, due to left hemispheric border zone infarction, with left supraclinoid ICA and M1 stenosis.  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     NEURO: Neuro exam worse morning of 05/26 now s/p POD # 1 left carotid stent for symptomatic severe carotid stenosis by Dr. Medina, exam now improved.  : started on high dose statin,   MRI Brain w/o, MRA Head and Neck w/o results as noted above. Physical therapy/OT recommending acute rehab.     ANTITHROMBOTIC THERAPY: Aspirin and Cangrelor drip for left supraclinoid stent. Plan to start PO      PULMONARY: CXR (05/25/) clear, Protecting airway, saturating well     CARDIOVASCULAR: TTE shows EF of 65%, Normal left ventricular systolic function. No segmental wall motion abnormalities, continue with cardiac monitoring.                              SBP goal: 110-140    GASTROINTESTINAL:  dysphagia screen passed, tolerating diet      Diet: Regular     RENAL: BUN/Cr without acute change, good urine output      Na Goal: Greater than 135     Newman: No    HEMATOLOGY: H/H without acute change, Platelets 181, no signs of bleeding. LE doppler (05/24): No evidence of DVT in either lower extremity.  DVT ppx: LMWH      ID: afebrile, Tmax 38, leukocytosis rising, infectious work up negative, ID following, consult appreciated will continue to monitor     OTHER: All questions and concerns addressed with patient bedside. Alcohol abuse counseling provided, will continue [facility]WA protocol     DISPOSITION: Acute Rehab as per PT/OT eval once stable and workup is complete.    CORE MEASURES:        Admission NIHSS: 1     TPA: [] YES [x] NO      LDL/HDL: 140/48     Depression Screen: p     Statin Therapy: y     Dysphagia Screen: [x] PASS [] FAIL     Smoking [] YES [x] NO      Afib [] YES [x] NO     Stroke Education [x] YES [] NO    Obtain screening lower extremity venous ultrasound in patients who meet 1 or more of the following criteria as patient is high risk for DVT/PE on admission:   [] History of DVT/PE  []Hypercoagulable states (Factor V Leiden, Cancer, OCP, etc. )  [x]Prolonged immobility (hemiplegia/hemiparesis/post operative or any other extended immobilization)- flight   [] Transferred from outside facility (Rehab or Long term care)  [] Age </= to 50. 67 year old left-handed male with medical history of macular degeneration (no vision loss) and EtOH use (daily 6-7 beers/day, last drink 2 days ago) who presents as a transfer from [facility] for tertiary stroke treatment.  Family at bedside to corroborate story.  Patient flew back from [place] Saturday (5/21) morning and took a cab home.  He said he "didn't feel good".  Later in the day, he was speaking one word sentences.  This morning when he woke up, family noted that he could not complete a full sentence.  Initial CTH 5/22/2022 with subacute left MCA territory infarct in the frontal lobe.  CTA with severe focal narrowing of left supraclinoid ICA extending to the carotid terminus, as well as the diminished opacification of the left anterior cerebral artery.  CTP with large penumbra in left MCA territory with mismatch volume of 12.7cc.  Patient transferred to [facility].  Stroke score of 1 for mild non-fluent aphasia.    Impression.  Mild-moderate or moderate transcortical motor aphasia, due to left hemispheric border zone infarction, with left supraclinoid ICA and M1 stenosis.  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     NEURO: Neuro exam worse morning of 05/26 now s/p POD # 1 left carotid stent for symptomatic severe carotid stenosis by Dr. Medina, exam now improved.  : started on high dose statin,   MRI Brain w/o, MRA Head and Neck w/o results as noted above. Physical therapy/OT recommending acute rehab.     ANTITHROMBOTIC THERAPY: Aspirin and Cangrelor drip for left supraclinoid stent. Plan to transition to oral antiplatelet as per [facility] recommendations     PULMONARY: CXR (05/25/) clear, Protecting airway, saturating well     CARDIOVASCULAR: TTE shows EF of 65%, Normal left ventricular systolic function. No segmental wall motion abnormalities, continue with cardiac monitoring.                              SBP goal: 110-140    GASTROINTESTINAL:  dysphagia screen passed, tolerating diet      Diet: Regular     RENAL: BUN/Cr without acute change, good urine output      Na Goal: Greater than 135     Newman: No    HEMATOLOGY: H/H without acute change, Platelets 181, no signs of bleeding. LE doppler (05/24): No evidence of DVT in either lower extremity.  DVT ppx: LMWH      ID: afebrile, Tmax 38, leukocytosis rising, infectious work up negative, ID following, consult appreciated will continue to monitor     OTHER: All questions and concerns addressed with patient bedside. Alcohol abuse counseling provided, will continue CIWA protocol     DISPOSITION: Acute Rehab as per PT/OT eval once stable and workup is complete.    CORE MEASURES:        Admission NIHSS: 1     TPA: [] YES [x] NO      LDL/HDL: 140/48     Depression Screen: p     Statin Therapy: y     Dysphagia Screen: [x] PASS [] FAIL     Smoking [] YES [x] NO      Afib [] YES [x] NO     Stroke Education [x] YES [] NO    Obtain screening lower extremity venous ultrasound in patients who meet 1 or more of the following criteria as patient is high risk for DVT/PE on admission:   [] History of DVT/PE  []Hypercoagulable states (Factor V Leiden, Cancer, OCP, etc. )  [x]Prolonged immobility (hemiplegia/hemiparesis/post operative or any other extended immobilization)- flight   [] Transferred from outside facility (Rehab or Long term care)  [] Age </= to 50. 67 year old left-handed male with medical history of macular degeneration (no vision loss) and EtOH use (daily 6-7 beers/day, last drink 2 days ago) who presents as a transfer from [facility] for tertiary stroke treatment.  Family at bedside to corroborate story.  Patient flew back from [place] Saturday (5/21) morning and took a cab home.  He said he "didn't feel good".  Later in the day, he was speaking one word sentences.  This morning when he woke up, family noted that he could not complete a full sentence.  Initial CTH 5/22/2022 with subacute left MCA territory infarct in the frontal lobe.  CTA with severe focal narrowing of left supraclinoid ICA extending to the carotid terminus, as well as the diminished opacification of the left anterior cerebral artery.  CTP with large penumbra in left MCA territory with mismatch volume of 12.7cc.  Patient transferred to [facility].  Stroke score of 1 for mild non-fluent aphasia.    Impression.  Mild-moderate or moderate transcortical motor aphasia, due to left hemispheric border zone infarction, with left supraclinoid ICA and M1 stenosis.  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     NEURO: Neuro exam worse morning of 05/26 now s/p POD # 1 left carotid stent for symptomatic severe carotid stenosis by Dr. Medina, exam now improved.  : started on high dose statin,   MRI Brain w/o, MRA Head and Neck w/o results as noted above. Physical therapy/OT recommending acute rehab.     ANTITHROMBOTIC THERAPY: Aspirin and Cangrelor drip for left supraclinoid ICA stent. Plan to transition to oral antiplatelet as per [facility] recommendations.  (05/26) P2Y12: 254 (05/28)     PULMONARY: CXR (05/25) clear, Protecting airway, saturating well     CARDIOVASCULAR: TTE shows EF of 65%, Normal left ventricular systolic function. No segmental wall motion abnormalities, continue with cardiac monitoring.                              SBP goal: 110-140    GASTROINTESTINAL:  dysphagia screen passed, tolerating diet      Diet: Regular     RENAL: BUN/Cr without acute change, good urine output      Na Goal: Greater than 135     Newman: No    HEMATOLOGY: H/H without acute change, Platelets 181 (05/27), no signs of bleeding. LE doppler (05/24): No evidence of DVT in either lower extremity.  DVT ppx: LMWH      ID: afebrile, Tmax 38, leukocytosis rising, infectious work up negative, fever likely reactive to the stroke, continue to monitor off antibiotics. MIcroti PCR: Negative. ID consult appreciated,     OTHER: All questions and concerns addressed with patient bedside. Alcohol abuse counseling provided, will continue [facility] protocol     DISPOSITION: Acute Rehab as per PT/OT eval once stable and workup is complete.    CORE MEASURES:        Admission NIHSS: 1     TPA:  NO      LDL/HDL: 140/48     Depression Screen: p     Statin Therapy: y     Dysphagia Screen:  PASS      Smoking NO      Afib NO     Stroke Education  YES    Obtain screening lower extremity venous ultrasound in patients who meet 1 or more of the following criteria as patient is high risk for DVT/PE on admission:   [] History of DVT/PE  []Hypercoagulable states (Factor V Leiden, Cancer, OCP, etc. )  [x]Prolonged immobility (hemiplegia/hemiparesis/post operative or any other extended immobilization)- flight   [] Transferred from outside facility (Rehab or Long term care)  [] Age </= to 50. 67 year old left-handed male with medical history of macular degeneration (no vision loss) and EtOH use (daily 6-7 beers/day, last drink 2 days ago) who presents as a transfer from [facility] for tertiary stroke treatment.  Family at bedside to corroborate story.  Patient flew back from [place] Saturday (5/21) morning and took a cab home.  He said he "didn't feel good".  Later in the day, he was speaking one word sentences.  This morning when he woke up, family noted that he could not complete a full sentence.  Initial CTH 5/22/2022 with subacute left MCA territory infarct in the frontal lobe.  CTA with severe focal narrowing of left supraclinoid ICA extending to the carotid terminus, as well as the diminished opacification of the left anterior cerebral artery.  CTP with large penumbra in left MCA territory with mismatch volume of 12.7cc.  Patient transferred to [facility].  Stroke score of 1 for mild non-fluent aphasia.    Impression.  Mild-moderate or moderate transcortical motor aphasia, due to left hemispheric border zone infarction, with left supraclinoid ICA and M1 stenosis.  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     NEURO: Neuro exam worse morning of 05/26 now s/p  left carotid stent for symptomatic severe carotid stenosis by Dr. Medina, exam now improved.  : started on high dose statin,   MRI Brain w/o, MRA Head and Neck w/o results as noted above. Physical therapy/OT recommending acute rehab.     ANTITHROMBOTIC THERAPY: Aspirin and Cangrelor drip for left supraclinoid ICA stent. Plan to transition to oral antiplatelet as per [facility] recommendations.  (05/26) P2Y12: 254 (05/28)     PULMONARY: CXR (05/25) clear, Protecting airway, saturating well     CARDIOVASCULAR: TTE shows EF of 65%, Normal left ventricular systolic function. No segmental wall motion abnormalities, continue with cardiac monitoring.                              SBP goal: 110-140    GASTROINTESTINAL:  dysphagia screen passed, tolerating diet      Diet: Regular     RENAL: BUN/Cr without acute change, good urine output      Na Goal: Greater than 135     Newman: No    HEMATOLOGY: H/H without acute change, Platelets 181 (05/27), no signs of bleeding. LE doppler (05/24): No evidence of DVT in either lower extremity.  DVT ppx: LMWH      ID: afebrile, Tmax 38, leukocytosis rising, infectious work up negative, fever likely reactive to the stroke, continue to monitor off antibiotics. MIcroti PCR: Negative. ID consult appreciated,     OTHER: All questions and concerns addressed with patient bedside. Alcohol abuse counseling provided, will continue [facility] protocol     DISPOSITION: Acute Rehab as per PT/OT eval once stable and workup is complete.    CORE MEASURES:        Admission NIHSS: 1     TPA:  NO      LDL/HDL: 140/48     Depression Screen: p     Statin Therapy: y     Dysphagia Screen:  PASS      Smoking NO      Afib NO     Stroke Education  YES    Obtain screening lower extremity venous ultrasound in patients who meet 1 or more of the following criteria as patient is high risk for DVT/PE on admission:   [] History of DVT/PE  []Hypercoagulable states (Factor V Leiden, Cancer, OCP, etc. )  [x]Prolonged immobility (hemiplegia/hemiparesis/post operative or any other extended immobilization)- flight   [] Transferred from outside facility (Rehab or Long term care)  [] Age </= to 50.

## 2022-05-28 NOTE — PROGRESS NOTE ADULT - ASSESSMENT
Summary:   Left supraclinoid ICA stenosis   Day 2 post left ICA stenting     NEURO:    q2h neuro checks   mg  Cangrelor gtt, MRI [facility], will transition to plavix   Tylenol   PT/OT/OBC     CARDS:    -140 mmhg off nicardipine   monitor for bradycardia   groin check, pulse check     PULM:    RA    RENAL:    IVl  I/O monitoring    GASTRO:    Regular diet  Stress ulcer prophylaxis:  not indicated    HEME:    monitor H/H    DVT prophylaxis: SCDs, hold anticoagulation    ENDO:   -180 mg/dl    ID:    Monitor for fever     Code status:  Full code  Disposition:  ICU    This patient was at high risk of neurologic deterioration and/or death due to: intracranial hemorrhage, ischemic stroke     not critical    Summary:   Left supraclinoid ICA stenosis   Day 3 post left ICA stenting     NEURO:    q2h neuro checks  MRI brain no stroke    mg  plavix 75 mg daily   Tylenol   PT/OT/OBC     CARDS:    -150 mmhg      PULM:    RA    RENAL:    IVl      GASTRO:    Regular diet  Stress ulcer prophylaxis:  not indicated    HEME:    monitor H/H    DVT prophylaxis: SCDs, lovenox 40 mg sc qhs     ENDO:   -180 mg/dl    ID:    afebrile     Code status:  Full code  Disposition:  ICU    This patient was at high risk of neurologic deterioration and/or death due to: intracranial hemorrhage, ischemic stroke     not critical

## 2022-05-28 NOTE — PROGRESS NOTE ADULT - SUBJECTIVE AND OBJECTIVE BOX
NEUROCRITICAL CARE  EVENING NOTE    BRIEF SUMMARY:  67yMale presented with Patient is a 67y old  Male who presents with a chief complaint of Left MCA ischemic infarct post left ICA stenting    VITALS/IMAGING/DATA/IVF FLUIDS/MEDICATIONS: [x] Reviewed    ALLERGIES: Allergies  No Known Allergies  Intolerances    EXAMINATION:  General: No acute distress  HEENT: Anicteric sclerae,    Cardiac: [id number]  Lungs: Clear  Abdomen: Soft, non-tender, +BS  Extremities: No c/c/e  Skin/Incision Site: Clean, dry and intact  Neurologic: Awake, alert, fully oriented, follows commands, EOMI, face symmetric, tongue midline, no drift, RUE 4+/5 (baseline)

## 2022-05-29 LAB
ANION GAP SERPL CALC-SCNC: 13 MMOL/L — SIGNIFICANT CHANGE UP (ref 5–17)
BUN SERPL-MCNC: 14 MG/DL — SIGNIFICANT CHANGE UP (ref 7–23)
CALCIUM SERPL-MCNC: 8.2 MG/DL — LOW (ref 8.4–10.5)
CHLORIDE SERPL-SCNC: 108 MMOL/L — SIGNIFICANT CHANGE UP (ref 96–108)
CO2 SERPL-SCNC: 22 MMOL/L — SIGNIFICANT CHANGE UP (ref 22–31)
CREAT SERPL-MCNC: 0.98 MG/DL — SIGNIFICANT CHANGE UP (ref 0.5–1.3)
EGFR: 83 ML/MIN/1.73M2 — SIGNIFICANT CHANGE UP
GLUCOSE SERPL-MCNC: 108 MG/DL — HIGH (ref 70–99)
HCT VFR BLD CALC: 35.6 % — LOW (ref 39–50)
HGB BLD-MCNC: 12.1 G/DL — LOW (ref 13–17)
MAGNESIUM SERPL-MCNC: 1.9 MG/DL — SIGNIFICANT CHANGE UP (ref 1.6–2.6)
MCHC RBC-ENTMCNC: 30.9 PG — SIGNIFICANT CHANGE UP (ref 27–34)
MCHC RBC-ENTMCNC: 34 GM/DL — SIGNIFICANT CHANGE UP (ref 32–36)
MCV RBC AUTO: 91 FL — SIGNIFICANT CHANGE UP (ref 80–100)
NRBC # BLD: 0 /100 WBCS — SIGNIFICANT CHANGE UP (ref 0–0)
PHOSPHATE SERPL-MCNC: 3.1 MG/DL — SIGNIFICANT CHANGE UP (ref 2.5–4.5)
PLATELET # BLD AUTO: 240 K/UL — SIGNIFICANT CHANGE UP (ref 150–400)
POTASSIUM SERPL-MCNC: 3.6 MMOL/L — SIGNIFICANT CHANGE UP (ref 3.5–5.3)
POTASSIUM SERPL-SCNC: 3.6 MMOL/L — SIGNIFICANT CHANGE UP (ref 3.5–5.3)
RBC # BLD: 3.91 M/UL — LOW (ref 4.2–5.8)
RBC # FLD: 13.1 % — SIGNIFICANT CHANGE UP (ref 10.3–14.5)
SODIUM SERPL-SCNC: 143 MMOL/L — SIGNIFICANT CHANGE UP (ref 135–145)
WBC # BLD: 9.84 K/UL — SIGNIFICANT CHANGE UP (ref 3.8–10.5)
WBC # FLD AUTO: 9.84 K/UL — SIGNIFICANT CHANGE UP (ref 3.8–10.5)

## 2022-05-29 PROCEDURE — 99233 SBSQ HOSP IP/OBS HIGH 50: CPT

## 2022-05-29 RX ORDER — HYDRALAZINE HCL 50 MG
5 TABLET ORAL ONCE
Refills: 0 | Status: COMPLETED | OUTPATIENT
Start: 2022-05-29 | End: 2022-05-29

## 2022-05-29 RX ORDER — LISINOPRIL 2.5 MG/1
5 TABLET ORAL DAILY
Refills: 0 | Status: DISCONTINUED | OUTPATIENT
Start: 2022-05-29 | End: 2022-06-02

## 2022-05-29 RX ADMIN — ATORVASTATIN CALCIUM 80 MILLIGRAM(S): 80 TABLET, FILM COATED ORAL at 21:18

## 2022-05-29 RX ADMIN — SENNA PLUS 2 TABLET(S): 8.6 TABLET ORAL at 21:18

## 2022-05-29 RX ADMIN — Medication 5 MILLIGRAM(S): at 18:25

## 2022-05-29 RX ADMIN — ENOXAPARIN SODIUM 40 MILLIGRAM(S): 100 INJECTION SUBCUTANEOUS at 16:54

## 2022-05-29 RX ADMIN — Medication 5 MILLIGRAM(S): at 08:52

## 2022-05-29 RX ADMIN — POLYETHYLENE GLYCOL 3350 17 GRAM(S): 17 POWDER, FOR SOLUTION ORAL at 16:54

## 2022-05-29 RX ADMIN — TICAGRELOR 60 MILLIGRAM(S): 90 TABLET ORAL at 13:10

## 2022-05-29 RX ADMIN — TICAGRELOR 60 MILLIGRAM(S): 90 TABLET ORAL at 01:26

## 2022-05-29 RX ADMIN — LATANOPROST 1 DROP(S): 0.05 SOLUTION/ DROPS OPHTHALMIC; TOPICAL at 21:19

## 2022-05-29 RX ADMIN — Medication 1 MILLIGRAM(S): at 11:24

## 2022-05-29 RX ADMIN — Medication 1 TABLET(S): at 11:25

## 2022-05-29 RX ADMIN — POLYETHYLENE GLYCOL 3350 17 GRAM(S): 17 POWDER, FOR SOLUTION ORAL at 06:12

## 2022-05-29 RX ADMIN — Medication 325 MILLIGRAM(S): at 11:24

## 2022-05-29 RX ADMIN — LISINOPRIL 5 MILLIGRAM(S): 2.5 TABLET ORAL at 18:25

## 2022-05-29 NOTE — PROGRESS NOTE ADULT - ASSESSMENT
67 year old left-handed male with medical history of macular degeneration (no vision loss) and EtOH use (daily 6-7 beers/day, last drink 2 days ago) who presents as a transfer from [facility] for tertiary stroke treatment.  Family at bedside to corroborate story.  Patient flew back from [place] Saturday (5/21) morning and took a cab home.  He said he "didn't feel good".  Later in the day, he was speaking one word sentences.  This morning when he woke up, family noted that he could not complete a full sentence.  Initial CTH 5/22/2022 with subacute left MCA territory infarct in the frontal lobe.  CTA with severe focal narrowing of left supraclinoid ICA extending to the carotid terminus, as well as the diminished opacification of the left anterior cerebral artery.  CTP with large penumbra in left MCA territory with mismatch volume of 12.7cc.  Patient transferred to [facility].  Stroke score of 1 for mild non-fluent aphasia.    Impression.  Mild-moderate or moderate transcortical motor aphasia, due to left hemispheric border zone infarction, with left supraclinoid ICA and M1 stenosis.  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     NEURO: Neuro exam worse morning of 05/26 now s/p  left carotid stent for symptomatic severe carotid stenosis by Dr. Medina, exam now improved.  : started on high dose statin,   MRI Brain w/o, MRA Head and Neck w/o results as noted above. Physical therapy/OT recommending acute rehab.     ANTITHROMBOTIC THERAPY: D/C Cangrelor drip , and started on Brilinta, rj continue Aspirin for left supraclinoid ICA stent.  (05/26) P2Y12: 92 (05/28)     PULMONARY: CXR (05/25) clear, Protecting airway, saturating well     CARDIOVASCULAR: TTE shows EF of 65%, Normal left ventricular systolic function. No segmental wall motion abnormalities, continue with cardiac monitoring.                              SBP goal: 110-140    GASTROINTESTINAL:  dysphagia screen passed, tolerating diet      Diet: Regular     RENAL: BUN/Cr without acute change, good urine output      Na Goal: Greater than 135     Newman: No    HEMATOLOGY: H/H anemia, will continue to monitor, Platelets 240 , no signs of bleeding. LE doppler (05/24): No evidence of DVT in either lower extremity.  DVT ppx: LMWH      ID: afebrile, Tmax 38 (05/25), leukocytosis resolved, infectious work up negative, prior fever likely reactive to the stroke, will continue to monitor off antibiotics. MIcroti PCR: Negative. ID consult appreciated,     OTHER: All questions and concerns addressed with patient bedside. Alcohol abuse counseling provided, will continue [facility] protocol     DISPOSITION: Acute Rehab as per PT/OT eval once stable and workup is complete.    CORE MEASURES:        Admission NIHSS: 1     TPA:  NO      LDL/HDL: 140/48     Depression Screen: N/A unable to participate with questionnaire     Statin Therapy: y     Dysphagia Screen:  PASS      Smoking NO      Afib NO     Stroke Education  YES    Obtain screening lower extremity venous ultrasound in patients who meet 1 or more of the following criteria as patient is high risk for DVT/PE on admission:   [] History of DVT/PE  []Hypercoagulable states (Factor V Leiden, Cancer, OCP, etc. )  [x]Prolonged immobility (hemiplegia/hemiparesis/post operative or any other extended immobilization)- flight   [] Transferred from outside facility (Rehab or Long term care)  [] Age </= to 50. 67 year old left-handed male with medical history of macular degeneration (no vision loss) and EtOH use (daily 6-7 beers/day, last drink 2 days ago) who presents as a transfer from [facility] for tertiary stroke treatment.  Family at bedside to corroborate story.  Patient flew back from [place] Saturday (5/21) morning and took a cab home.  He said he "didn't feel good".  Later in the day, he was speaking one word sentences.  This morning when he woke up, family noted that he could not complete a full sentence.  Initial CTH 5/22/2022 with subacute left MCA territory infarct in the frontal lobe.  CTA with severe focal narrowing of left supraclinoid ICA extending to the carotid terminus, as well as the diminished opacification of the left anterior cerebral artery.  CTP with large penumbra in left MCA territory with mismatch volume of 12.7cc.  Patient transferred to [facility].  Stroke score of 1 for mild non-fluent aphasia.    Impression.  Mild-moderate or moderate transcortical motor aphasia, due to left hemispheric border zone infarction, with left supraclinoid ICA and M1 stenosis.  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     NEURO: Neuro exam worse morning of 05/26 with right hemiparesis now s/p  left carotid stent for symptomatic severe carotid stenosis by Dr. Medina, exam now improved and overall stable.  : started on high dose statin,   MRI Brain w/o, MRA Head and Neck w/o results as noted above. Physical therapy/OT recommending acute rehab.     ANTITHROMBOTIC THERAPY: D/C Cangrelor drip , and started on Brilinta, will continue Aspirin for left supraclinoid ICA stent.  (05/26) P2Y12: 92 (05/28)     PULMONARY: CXR (05/25) clear, Protecting airway, saturating well     CARDIOVASCULAR: TTE shows EF of 65%, Normal left ventricular systolic function. No segmental wall motion abnormalities, continue with cardiac monitoring.                              SBP goal: 110-140    GASTROINTESTINAL:  dysphagia screen passed, tolerating diet      Diet: Regular     RENAL: BUN/Cr without acute change, good urine output      Na Goal: Greater than 135     Newman: No    HEMATOLOGY: H/H anemia, will continue to monitor, Platelets 240 , no signs of bleeding. LE doppler (05/24): No evidence of DVT in either lower extremity.  DVT ppx: LMWH      ID: afebrile, Tmax 38 (05/25), leukocytosis resolved, infectious work up negative, prior fever likely reactive to the stroke, will continue to monitor off antibiotics. MIcroti PCR: Negative. ID consult appreciated,     OTHER: All questions and concerns addressed with patient bedside. Alcohol abuse counseling provided, will continue CIWA protocol     DISPOSITION: Acute Rehab as per PT/OT eval once stable and workup is complete.    CORE MEASURES:        Admission NIHSS: 1     TPA:  NO      LDL/HDL: 140/48     Depression Screen: N/A unable to participate with questionnaire     Statin Therapy: y     Dysphagia Screen:  PASS      Smoking NO      Afib NO     Stroke Education  YES    Obtain screening lower extremity venous ultrasound in patients who meet 1 or more of the following criteria as patient is high risk for DVT/PE on admission:   [] History of DVT/PE  []Hypercoagulable states (Factor V Leiden, Cancer, OCP, etc. )  [x]Prolonged immobility (hemiplegia/hemiparesis/post operative or any other extended immobilization)- flight   [] Transferred from outside facility (Rehab or Long term care)  [] Age </= to 50.

## 2022-05-29 NOTE — PROGRESS NOTE ADULT - SUBJECTIVE AND OBJECTIVE BOX
CC: f/u for  fever, leukocytosis  Patient reports  he is ok  REVIEW OF SYSTEMS:  All other review of systems negative (Comprehensive ROS)    Antimicrobials Day #  :    Other Medications Reviewed    T(F): 99.1 (05-29-22 @ 14:00), Max: 99.8 (05-29-22 @ 08:00)  HR: 71 (05-29-22 @ 18:50)  BP: 147/66 (05-29-22 @ 18:50)  RR: 17 (05-29-22 @ 18:50)  SpO2: 99% (05-29-22 @ 18:50)  Wt(kg): --    PHYSICAL EXAM:  General: alert, no acute distress  Eyes:  anicteric, no conjunctival injection, no discharge  Oropharynx: no lesions or injection 	  Neck: supple, without adenopathy  Lungs: clear to auscultation  Heart: regular rate and rhythm; no murmur, rubs or gallops  Abdomen: soft, nondistended, nontender, without mass or organomegaly  Skin: no lesions  Extremities: no clubbing, cyanosis, or edema  Neurologic: alert, oriented, moves all extremities, somewhat aphasic    LAB RESULTS:                        12.1   9.84  )-----------( 240      ( 29 May 2022 06:24 )             35.6     05-29    143  |  108  |  14  ----------------------------<  108<H>  3.6   |  22  |  0.98    Ca    8.2<L>      29 May 2022 06:23  Phos  3.1     05-29  Mg     1.9     05-29          MICROBIOLOGY:  RECENT CULTURES:  05-27 @ 10:13 .Blood     Babesia microti PCR  Results: NOT detected  ***************Result Note*************  The detection of Babesia microti by PCR has only been  validated for whole blood; this test has not been approved  by the US Food and Drug Administration (FDA). Performance  characteristics of this assay have been determined by  [employer]. The clinical significance  of results should be considered in conjunction with the  overall clinical presentation of the patient. Result is not  intended to be used as the sole means for clinical diagnosis  or patient management decisions.  One negative sample does not necessarily rule  out the presence of a parasitic infection.      05-26 @ 17:28 .Blood Blood     NEGATIVE for Plasmodium antigens. Microscopy is performed for  confirmation.  This test does not detect the presence of Babesia species.  If Babesiosis is suspected, please order test for Babesia PCR: Babesia  microti PCR Bld  ************************************************************  No Blood Parasites observed by giemsa stain  One negative set of blood smears does not rule out  the possibility of a parasitic infection.  A minimum of 3  specimens should be collected, at least 12-24 hours apart,  over a 36 hour time period.      05-25 @ 02:11 Clean Catch Clean Catch (Midstream)     <10,000 CFU/mL Normal Urogenital Dariela      05-25 @ 01:36 .Blood Blood-Venous     No growth to date.          RADIOLOGY REVIEWED:  < from: MR Head No Cont (05.27.22 @ 13:28) >  ACC: [id number] EXAM:  MR BRAIN                        ACC: [id number] EXAM:  MR BRAIN WAW IC                          PROCEDURE DATE:  05/27/2022          INTERPRETATION:  CLINICAL INDICATION: Post angioplasty and stenting of   left supraclinoid internal carotid artery stenosis      Magnetic resonance imaging of the brain was carried out with transaxial   SPGR, FLAIR, fast spin echo T2 weighted images, axial susceptibility   weighted series, diffusion weighted series and sagittal T1 weighted   series on a 1.5 T[place] magnet.    Comparison is made with the prior MRI/MRA of 5/23/2022, MRI and   conventional angiogram 5/26/2022.    The fourth, third and lateral ventricles are normal in size and position.   There is no hemorrhage, mass or shift of the midline structures. Left   frontal infarct is unchanged since 5/26/2022. Minimal linear   susceptibility is identified suggesting either a thrombosed vessel or   hemorrhage. No new infarcts are identified.        A 2 D and 3-D axial noncontrast MRAwere performed on the cervical and   intracranial vessels, respectively. Intravascular flow quantification was   performed using gated 2D phase contrast MR, imaged perpendicular to the   vessel axis.  Images were post processed NOVA software and a NOVA flow   study report is available.    In the interval since the prior exam there has been a stent placed in the   left supraclinoid internal carotid artery with significantly improved   flow in the left internal carotid artery as well as the left anterior   cerebral artery, flow in the middle cerebral artery is only slightly   improved which may be related to a left M1 stenosis.    MARIJA 187, RMCA 141, RACA 11, RACA2 72  compared with 5/23/2022  MARIJA 191, RMCA 117, RACA 16, RACA2 71.    LICA 148,LMCA 44, LACA 134, LACA2 78  compared with 5/23/2022  LICA 62, LMCA 36, LACA 35, LACA 2 30.    , , , RPCA 63, LPCA 131  compared with 5/23/2022  , , , RPCA 66, LPCA 194    IMPRESSION: No change in left frontal infarct compared with 5/26/2022.   Improved flow in the left internal carotid artery and left anterior   cerebral artery compared with 5/23/2022. Only mildly improved flow in the   left MCA is related to the presence of a left M1 stenosis.    --- End of Report ---          < end of copied text >              Assessment:  Patient admitted 5/22 with new onset aphasia, just returned from [facility] for 5 months, found to have left mca infarct, under went carotid stent placement, had fever that resolved,  some leukocytosis now moderated. Malaria neg. NO infection apparent. Blood cx neg too    Plan:  monitor off antibiotics  f/u final blood cx

## 2022-05-29 NOTE — PROGRESS NOTE ADULT - SUBJECTIVE AND OBJECTIVE BOX
THE PATIENT WAS SEEN AND EXAMINED BY ME WITH THE HOUSESTAFF AND STROKE TEAM DURING MORNING ROUNDS.   HPI:  67 year old left-handed male with medical history of macular degeneration (no vision loss) and EtOH use (daily 6-7 beers/day, last drink 2 days ago) who presents as a transfer from [facility] for tertiary stroke treatment.  Family at bedside to corroborate story.  Patient flew back from [place] Saturday (5/21) morning and took a cab home.  He said he "didn't feel good".  Later in the day, he was speaking one word sentences.  On morning of admission when he woke up, family noted that he could not complete a full sentence.  Initial CTH 5/22/2022 with subacute left MCA territory infarct in the frontal lobe.  CTA with severe focal narrowing of left supraclinoid ICA extending to the carotid terminus, as well as the diminished opacification of the left anterior cerebral artery.  CTP with large penumbra in left MCA territory with mismatch volume of 12.7cc.  Patient transferred to [facility].  Stroke score of 1 for mild non-fluent aphasia. NIHSS 1 preMRS 0    SUBJECTIVE: No events overnight.  No new neurologic complaints, ROS reported negative unless otherwise noted.      acetaminophen     Tablet .. 650 milliGRAM(s) Oral every 6 hours PRN  aspirin 325 milliGRAM(s) Oral daily  atorvastatin 80 milliGRAM(s) Oral at bedtime  enoxaparin Injectable 40 milliGRAM(s) SubCutaneous <User Schedule>  folic acid 1 milliGRAM(s) Oral daily  hydrALAZINE Injectable 5 milliGRAM(s) IV Push once  latanoprost 0.005% Ophthalmic Solution 1 Drop(s) Both EYES at bedtime  multivitamin 1 Tablet(s) Oral daily  polyethylene glycol 3350 17 Gram(s) Oral two times a day  senna 2 Tablet(s) Oral at bedtime  ticagrelor 60 milliGRAM(s) Oral <User Schedule>      PHYSICAL EXAM:   Vital Signs Last 24 Hrs  T(C): 37.7 (29 May 2022 08:00), Max: 37.7 (29 May 2022 08:00)  T(F): 99.8 (29 May 2022 08:00), Max: 99.8 (29 May 2022 08:00)  HR: 68 (29 May 2022 08:00) (52 - 68)  BP: 166/73 (29 May 2022 08:00) (130/60 - 166/73)  BP(mean): 94 (29 May 2022 08:00) (77 - 104)  RR: 11 (29 May 2022 08:00) (11 - 21)  SpO2: 97% (29 May 2022 08:00) (96% - 100%)    General: No acute distress  HEENT: EOM intact, BTT less on R  Abdomen: Soft, nontender, nondistended   Extremities: No edema    NEUROLOGICAL EXAM:  Mental status: Eyes open, awake, alert, oriented to self, date and hospital, speech moderately dysfluent,  follows cross commands, repetition intact  Cranial Nerves: No facial asymmetry, no nystagmus, mild dysarthria,  tongue midline  Motor exam: Normal tone, No drift  Right hemiparesis RUE deltoid 4/5, distally 5/5, RLE 5/5,  mildly slow fine finger movements to right hand, LUE 5/5, LLE 5/5   Sensation: Intact to light touch   Coordination/ Gait: No dysmetria, gait deferred     LABS:                        12.1   9.84  )-----------( 240      ( 29 May 2022 06:24 )             35.6    05-29    143  |  108  |  14  ----------------------------<  108<H>  3.6   |  22  |  0.98    Ca    8.2<L>      29 May 2022 06:23  Phos  3.1     05-29  Mg     1.9     05-29      IMAGING: Reviewed by me.     MRI Head (05/27): No change in left frontal infarct compared with 5/26/2022. Improved flow in the left internal carotid artery and left anterior cerebral artery compared with 5/23/2022. Only mildly improved flow in the left MCA is related to the presence of a left M1 stenosis.    MRI HEAD 05/26/22 Post angioplasty and stenting: Again identified is an acute/subacute  left MCA territory infarct. There is no gross infarct extension or hemorrhagic conversion    MR Head/MRA Head and Neck (5/23/22): Acute left middle cerebral artery infarct similar to that predicted on the CT perfusion. No hemorrhage. Marked narrowing of the left supraclinoid internal carotid artery with diminished antegrade flow in the left anterior and middle cerebral arteries. No significant cross-filling from the right anterior communicating artery or the left posterior communicating artery. Markedly elevated flow in the left posterior cerebral artery suggesting chronicity with collateralization. Noninvasive flow MR angiography     5/22/22:  CT PERFUSION: Large ischemic penumbra in the left middle cerebral artery territory with mismatch volume of 12.7.    CTA COW:   Diminished opacification of the left internal carotid artery, with focal severe narrowing of the left supraclinoid ICA extending to the carotid terminus, as well as diminished opacification of the left anterior cerebral artery and diminished caliber and opacification of left middle cerebral artery branches, including markedly severe effacement of the left M1 segment. No vessel cut off.    CTA NECK:   Patent, ECAs, ICAs, although diffusely decreased caliber of the left internal carotid artery extending to the skull base, cannot exclude a dissection. Atherosclerotic plaque at both carotid bifurcations with less than 50% stenosis at the ICA origins by NASCET criteria. Bilateral vertebral arteries are patent without flow limiting stenosis.    CTH 5/22/22:  Subacute left middle cerebral artery territory infarct in the frontal lobe. No evidence for hemorrhagic conversion.   THE PATIENT WAS SEEN AND EXAMINED BY ME WITH THE HOUSESTAFF AND STROKE TEAM DURING MORNING ROUNDS.   HPI:  67 year old left-handed male with medical history of macular degeneration (no vision loss) and EtOH use (daily 6-7 beers/day, last drink 2 days ago) who presents as a transfer from [facility] for tertiary stroke treatment.  Family at bedside to corroborate story.  Patient flew back from [place] Saturday (5/21) morning and took a cab home.  He said he "didn't feel good".  Later in the day, he was speaking one word sentences.  On morning of admission when he woke up, family noted that he could not complete a full sentence.  Initial CTH 5/22/2022 with subacute left MCA territory infarct in the frontal lobe.  CTA with severe focal narrowing of left supraclinoid ICA extending to the carotid terminus, as well as the diminished opacification of the left anterior cerebral artery.  CTP with large penumbra in left MCA territory with mismatch volume of 12.7cc.  Patient transferred to [facility].  Stroke score of 1 for mild non-fluent aphasia. NIHSS 1 preMRS 0    SUBJECTIVE: No events overnight.  No new neurologic complaints, ROS reported negative unless otherwise noted.      acetaminophen     Tablet .. 650 milliGRAM(s) Oral every 6 hours PRN  aspirin 325 milliGRAM(s) Oral daily  atorvastatin 80 milliGRAM(s) Oral at bedtime  enoxaparin Injectable 40 milliGRAM(s) SubCutaneous <User Schedule>  folic acid 1 milliGRAM(s) Oral daily  hydrALAZINE Injectable 5 milliGRAM(s) IV Push once  latanoprost 0.005% Ophthalmic Solution 1 Drop(s) Both EYES at bedtime  multivitamin 1 Tablet(s) Oral daily  polyethylene glycol 3350 17 Gram(s) Oral two times a day  senna 2 Tablet(s) Oral at bedtime  ticagrelor 60 milliGRAM(s) Oral <User Schedule>      PHYSICAL EXAM:   Vital Signs Last 24 Hrs  T(C): 37.7 (29 May 2022 08:00), Max: 37.7 (29 May 2022 08:00)  T(F): 99.8 (29 May 2022 08:00), Max: 99.8 (29 May 2022 08:00)  HR: 68 (29 May 2022 08:00) (52 - 68)  BP: 166/73 (29 May 2022 08:00) (130/60 - 166/73)  BP(mean): 94 (29 May 2022 08:00) (77 - 104)  RR: 11 (29 May 2022 08:00) (11 - 21)  SpO2: 97% (29 May 2022 08:00) (96% - 100%)    General: No acute distress  HEENT: EOM intact, BTT less on R  Abdomen: Soft, nontender, nondistended   Extremities: No edema    NEUROLOGICAL EXAM:  Mental status: Eyes open, awake, alert, oriented to self, date and hospital, speech moderately dysfluent,  follows cross commands, repetition intact  Cranial Nerves: Subtle right facial droop, no nystagmus, mild dysarthria,  tongue midline  Motor exam: Normal tone, Right hemiparesis RUE subtle drift, prox 4/5, distally 5/5, RLE prox 4/5, distally 5/5,  mildly slow fine finger movements to right hand, LUE 5/5, LLE 5/5   Sensation: Intact to light touch   Coordination/ Gait: No dysmetria, gait deferred     LABS:                        12.1   9.84  )-----------( 240      ( 29 May 2022 06:24 )             35.6    05-29    143  |  108  |  14  ----------------------------<  108<H>  3.6   |  22  |  0.98    Ca    8.2<L>      29 May 2022 06:23  Phos  3.1     05-29  Mg     1.9     05-29      IMAGING: Reviewed by me.     MRI Head (05/27): No change in left frontal infarct compared with 5/26/2022. Improved flow in the left internal carotid artery and left anterior cerebral artery compared with 5/23/2022. Only mildly improved flow in the left MCA is related to the presence of a left M1 stenosis.    MRI HEAD 05/26/22 Post angioplasty and stenting: Again identified is an acute/subacute  left MCA territory infarct. There is no gross infarct extension or hemorrhagic conversion    MR Head/MRA Head and Neck (5/23/22): Acute left middle cerebral artery infarct similar to that predicted on the CT perfusion. No hemorrhage. Marked narrowing of the left supraclinoid internal carotid artery with diminished antegrade flow in the left anterior and middle cerebral arteries. No significant cross-filling from the right anterior communicating artery or the left posterior communicating artery. Markedly elevated flow in the left posterior cerebral artery suggesting chronicity with collateralization. Noninvasive flow MR angiography     5/22/22:  CT PERFUSION: Large ischemic penumbra in the left middle cerebral artery territory with mismatch volume of 12.7.    CTA COW:   Diminished opacification of the left internal carotid artery, with focal severe narrowing of the left supraclinoid ICA extending to the carotid terminus, as well as diminished opacification of the left anterior cerebral artery and diminished caliber and opacification of left middle cerebral artery branches, including markedly severe effacement of the left M1 segment. No vessel cut off.    CTA NECK:   Patent, ECAs, ICAs, although diffusely decreased caliber of the left internal carotid artery extending to the skull base, cannot exclude a dissection. Atherosclerotic plaque at both carotid bifurcations with less than 50% stenosis at the ICA origins by NASCET criteria. Bilateral vertebral arteries are patent without flow limiting stenosis.    CTH 5/22/22:  Subacute left middle cerebral artery territory infarct in the frontal lobe. No evidence for hemorrhagic conversion.

## 2022-05-30 DIAGNOSIS — I10 ESSENTIAL (PRIMARY) HYPERTENSION: ICD-10-CM

## 2022-05-30 DIAGNOSIS — E78.5 HYPERLIPIDEMIA, UNSPECIFIED: ICD-10-CM

## 2022-05-30 DIAGNOSIS — I63.9 CEREBRAL INFARCTION, UNSPECIFIED: ICD-10-CM

## 2022-05-30 LAB
ANION GAP SERPL CALC-SCNC: 12 MMOL/L — SIGNIFICANT CHANGE UP (ref 5–17)
BUN SERPL-MCNC: 16 MG/DL — SIGNIFICANT CHANGE UP (ref 7–23)
CALCIUM SERPL-MCNC: 9 MG/DL — SIGNIFICANT CHANGE UP (ref 8.4–10.5)
CHLORIDE SERPL-SCNC: 107 MMOL/L — SIGNIFICANT CHANGE UP (ref 96–108)
CO2 SERPL-SCNC: 22 MMOL/L — SIGNIFICANT CHANGE UP (ref 22–31)
CREAT SERPL-MCNC: 0.96 MG/DL — SIGNIFICANT CHANGE UP (ref 0.5–1.3)
CULTURE RESULTS: SIGNIFICANT CHANGE UP
CULTURE RESULTS: SIGNIFICANT CHANGE UP
EGFR: 86 ML/MIN/1.73M2 — SIGNIFICANT CHANGE UP
GLUCOSE SERPL-MCNC: 114 MG/DL — HIGH (ref 70–99)
HCT VFR BLD CALC: 38.5 % — LOW (ref 39–50)
HGB BLD-MCNC: 13.1 G/DL — SIGNIFICANT CHANGE UP (ref 13–17)
MCHC RBC-ENTMCNC: 31 PG — SIGNIFICANT CHANGE UP (ref 27–34)
MCHC RBC-ENTMCNC: 34 GM/DL — SIGNIFICANT CHANGE UP (ref 32–36)
MCV RBC AUTO: 91.2 FL — SIGNIFICANT CHANGE UP (ref 80–100)
NRBC # BLD: 0 /100 WBCS — SIGNIFICANT CHANGE UP (ref 0–0)
PLATELET # BLD AUTO: 286 K/UL — SIGNIFICANT CHANGE UP (ref 150–400)
POTASSIUM SERPL-MCNC: 3.7 MMOL/L — SIGNIFICANT CHANGE UP (ref 3.5–5.3)
POTASSIUM SERPL-SCNC: 3.7 MMOL/L — SIGNIFICANT CHANGE UP (ref 3.5–5.3)
RBC # BLD: 4.22 M/UL — SIGNIFICANT CHANGE UP (ref 4.2–5.8)
RBC # FLD: 13.1 % — SIGNIFICANT CHANGE UP (ref 10.3–14.5)
SODIUM SERPL-SCNC: 141 MMOL/L — SIGNIFICANT CHANGE UP (ref 135–145)
SPECIMEN SOURCE: SIGNIFICANT CHANGE UP
SPECIMEN SOURCE: SIGNIFICANT CHANGE UP
WBC # BLD: 9.78 K/UL — SIGNIFICANT CHANGE UP (ref 3.8–10.5)
WBC # FLD AUTO: 9.78 K/UL — SIGNIFICANT CHANGE UP (ref 3.8–10.5)

## 2022-05-30 PROCEDURE — 99233 SBSQ HOSP IP/OBS HIGH 50: CPT

## 2022-05-30 RX ADMIN — Medication 1 MILLIGRAM(S): at 11:07

## 2022-05-30 RX ADMIN — SENNA PLUS 2 TABLET(S): 8.6 TABLET ORAL at 21:07

## 2022-05-30 RX ADMIN — LATANOPROST 1 DROP(S): 0.05 SOLUTION/ DROPS OPHTHALMIC; TOPICAL at 21:07

## 2022-05-30 RX ADMIN — Medication 1 TABLET(S): at 11:07

## 2022-05-30 RX ADMIN — POLYETHYLENE GLYCOL 3350 17 GRAM(S): 17 POWDER, FOR SOLUTION ORAL at 16:51

## 2022-05-30 RX ADMIN — Medication 325 MILLIGRAM(S): at 11:07

## 2022-05-30 RX ADMIN — TICAGRELOR 60 MILLIGRAM(S): 90 TABLET ORAL at 00:21

## 2022-05-30 RX ADMIN — ATORVASTATIN CALCIUM 80 MILLIGRAM(S): 80 TABLET, FILM COATED ORAL at 21:07

## 2022-05-30 RX ADMIN — TICAGRELOR 60 MILLIGRAM(S): 90 TABLET ORAL at 12:33

## 2022-05-30 RX ADMIN — LISINOPRIL 5 MILLIGRAM(S): 2.5 TABLET ORAL at 06:05

## 2022-05-30 RX ADMIN — ENOXAPARIN SODIUM 40 MILLIGRAM(S): 100 INJECTION SUBCUTANEOUS at 16:51

## 2022-05-30 NOTE — PROGRESS NOTE ADULT - ASSESSMENT
67 year old left-handed male with medical history of macular degeneration (no vision loss) and EtOH use (daily 6-7 beers/day, last drink 2 days ago) who presents as a transfer from [facility] for tertiary stroke treatment.  Family at bedside to corroborate story.  Patient flew back from [place] Saturday (5/21) morning and took a cab home.  He said he "didn't feel good".  Later in the day, he was speaking one word sentences.  This morning when he woke up, family noted that he could not complete a full sentence.  Initial CTH 5/22/2022 with subacute left MCA territory infarct in the frontal lobe.  CTA with severe focal narrowing of left supraclinoid ICA extending to the carotid terminus, as well as the diminished opacification of the left anterior cerebral artery.  CTP with large penumbra in left MCA territory with mismatch volume of 12.7cc.  Patient transferred to [facility].  Stroke score of 1 for mild non-fluent aphasia.    Impression.  Mild-moderate or moderate transcortical motor aphasia, due to left hemispheric border zone infarction, with left supraclinoid ICA and M1 stenosis.  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     NEURO: Neuro exam worse morning of 05/26 with right hemiparesis now s/p  left carotid stent for symptomatic severe carotid stenosis by Dr. Medina, exam now improved and overall stable.  : started on high dose statin,   MRI Brain w/o, MRA Head and Neck w/o results as noted above. Physical therapy/OT recommending acute rehab.     ANTITHROMBOTIC THERAPY:   on Brilinta, will continue Aspirin for left supraclinoid ICA stent.  (05/26) P2Y12: 92 (05/28)     PULMONARY: CXR (05/25) clear, Protecting airway, saturating well     CARDIOVASCULAR: TTE shows EF of 65%, Normal left ventricular systolic function. No segmental wall motion abnormalities, continue with cardiac monitoring. close cardiology follow up                           SBP goal: 110-140    GASTROINTESTINAL:  dysphagia screen passed, tolerating diet      Diet: Regular     RENAL: BUN/Cr without acute change, good urine output , maintain adequate hydration      Na Goal: Greater than 135     Newman: No    HEMATOLOGY: H/H anemia, will continue to monitor, Platelets 286, no signs of bleeding. LE doppler (05/24): No evidence of DVT in either lower extremity.  DVT ppx: LMWH      ID: afebrile, Tmax 38 (05/25), leukocytosis resolved, infectious work up negative, prior fever likely reactive to the stroke, will continue to monitor off antibiotics. MIcroti PCR: Negative. ID consult appreciated, monitor off abx- follow final blood cx    OTHER: All questions and concerns addressed with patient bedside. Alcohol abuse counseling provided, will continue CIWA protocol     DISPOSITION: Acute Rehab as per PT/OT eval once stable and workup is complete.    CORE MEASURES:        Admission NIHSS: 1     TPA:  NO      LDL/HDL: 140/48     Depression Screen: N/A unable to participate with questionnaire     Statin Therapy: y     Dysphagia Screen:  PASS      Smoking NO      Afib NO     Stroke Education  YES    Obtain screening lower extremity venous ultrasound in patients who meet 1 or more of the following criteria as patient is high risk for DVT/PE on admission:   [] History of DVT/PE  []Hypercoagulable states (Factor V Leiden, Cancer, OCP, etc. )  [x]Prolonged immobility (hemiplegia/hemiparesis/post operative or any other extended immobilization)- flight   [] Transferred from outside facility (Rehab or Long term care)  [] Age </= to 50. 67 year old left-handed male with medical history of macular degeneration (no vision loss) and EtOH use (daily 6-7 beers/day, last drink 2 days ago) who presents as a transfer from [facility] for tertiary stroke treatment.  Family at bedside to corroborate story.  Patient flew back from [place] Saturday (5/21) morning and took a cab home.  He said he "didn't feel good".  Later in the day, he was speaking one word sentences.  This morning when he woke up, family noted that he could not complete a full sentence.  Initial CTH 5/22/2022 with subacute left MCA territory infarct in the frontal lobe.  CTA with severe focal narrowing of left supraclinoid ICA extending to the carotid terminus, as well as the diminished opacification of the left anterior cerebral artery.  CTP with large penumbra in left MCA territory with mismatch volume of 12.7cc.  Patient transferred to [facility].  Stroke score of 1 for mild non-fluent aphasia.    Impression.  Mild-moderate or moderate transcortical motor aphasia, due to left hemispheric border zone infarction, with left supraclinoid ICA and M1 stenosis.  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     NEURO: Neuro exam worse morning of 05/26 with right hemiparesis now s/p  left carotid stent for symptomatic severe carotid stenosis by Dr. Medina, exam now improved and overall stable.  : started on high dose statin,   MRI Brain w/o, MRA Head and Neck w/o results as noted above. Physical therapy/OT recommending acute rehab.     ANTITHROMBOTIC THERAPY:   on Brilinta, will continue Aspirin for left supraclinoid ICA stent.  (05/26) P2Y12: 92 (05/28)     PULMONARY: CXR (05/25) clear, Protecting airway, saturating well     CARDIOVASCULAR: TTE shows EF of 65%, Normal left ventricular systolic function. No segmental wall motion abnormalities, continue with cardiac monitoring. close cardiology follow up                           SBP goal: 110-140    GASTROINTESTINAL:  dysphagia screen passed, tolerating diet      Diet: Regular     RENAL: BUN/Cr without acute change, good urine output , maintain adequate hydration      Na Goal: Greater than 135     Newman: No    HEMATOLOGY: H/H anemia- mild improvement, will continue to monitor, Platelets 286, no signs of bleeding. LE doppler (05/24): No evidence of DVT in either lower extremity.  DVT ppx: LMWH      ID: afebrile, Tmax 38 (05/25), leukocytosis resolved, infectious work up negative, prior fever likely reactive to the stroke, will continue to monitor off antibiotics. MIcroti PCR: Negative. ID consult appreciated, monitor off abx- follow final blood cx    OTHER: All questions and concerns addressed with patient bedside. Alcohol abuse counseling provided, will continue [facility] protocol     DISPOSITION: Acute Rehab as per PT/OT eval once stable and workup is complete.    CORE MEASURES:        Admission NIHSS: 1     TPA:  NO      LDL/HDL: 140/48     Depression Screen: N/A unable to participate with questionnaire     Statin Therapy: y     Dysphagia Screen:  PASS      Smoking NO      Afib NO     Stroke Education  YES    Obtain screening lower extremity venous ultrasound in patients who meet 1 or more of the following criteria as patient is high risk for DVT/PE on admission:   [] History of DVT/PE  []Hypercoagulable states (Factor V Leiden, Cancer, OCP, etc. )  [x]Prolonged immobility (hemiplegia/hemiparesis/post operative or any other extended immobilization)- flight   [] Transferred from outside facility (Rehab or Long term care)  [] Age </= to 50.

## 2022-05-30 NOTE — CONSULT NOTE ADULT - ASSESSMENT
7 year old left-handed male with medical history of macular degeneration (no vision loss) and EtOH use (daily 6-7 beers/day, last drink 2 days ago) who presents as a transfer from [facility] for tertiary stroke treatment.  Family at bedside to corroborate story.  Patient flew back from [place] Saturday (5/21) morning and took a cab home.  He said he "didn't feel good".  Later in the day, he was speaking one word sentences.  This morning when he woke up, family noted that he could not complete a full sentence.  Initial CTH 5/22/2022 with subacute left MCA territory infarct in the frontal lobe.  CTA with severe focal narrowing of left supraclinoid ICA extending to the carotid terminus, as well as the diminished opacification of the left anterior cerebral artery.  CTP with large penumbra in left MCA territory with mismatch volume of 12.7cc.  Patient transferred to [facility].  Stroke score of 1 for mild non-fluent aphasia.    Impression.  Mild-moderate or moderate transcortical motor aphasia, due to left hemispheric border zone infarction, with left supraclinoid ICA and M1 stenosis.

## 2022-05-30 NOTE — PROGRESS NOTE ADULT - SUBJECTIVE AND OBJECTIVE BOX
THE PATIENT WAS SEEN AND EXAMINED BY ME WITH THE HOUSESTAFF AND STROKE TEAM DURING MORNING ROUNDS.   HPI:  67 year old left-handed male with medical history of macular degeneration (no vision loss) and EtOH use (daily 6-7 beers/day, last drink 2 days ago) who presents as a transfer from [facility] for tertiary stroke treatment.  Family at bedside to corroborate story.  Patient flew back from [place] Saturday (5/21) morning and took a cab home.  He said he "didn't feel good".  Later in the day, he was speaking one word sentences.  On morning of admission when he woke up, family noted that he could not complete a full sentence.  Initial CTH 5/22/2022 with subacute left MCA territory infarct in the frontal lobe.  CTA with severe focal narrowing of left supraclinoid ICA extending to the carotid terminus, as well as the diminished opacification of the left anterior cerebral artery.  CTP with large penumbra in left MCA territory with mismatch volume of 12.7cc.  Patient transferred to [facility].  Stroke score of 1 for mild non-fluent aphasia. NIHSS 1 preMRS 0    SUBJECTIVE: No events overnight.  No new neurologic complaints.  ROS reported negative unless otherwise noted.    acetaminophen     Tablet .. 650 milliGRAM(s) Oral every 6 hours PRN  aspirin 325 milliGRAM(s) Oral daily  atorvastatin 80 milliGRAM(s) Oral at bedtime  enoxaparin Injectable 40 milliGRAM(s) SubCutaneous <User Schedule>  folic acid 1 milliGRAM(s) Oral daily  latanoprost 0.005% Ophthalmic Solution 1 Drop(s) Both EYES at bedtime  lisinopril 5 milliGRAM(s) Oral daily  multivitamin 1 Tablet(s) Oral daily  polyethylene glycol 3350 17 Gram(s) Oral two times a day  senna 2 Tablet(s) Oral at bedtime  ticagrelor 60 milliGRAM(s) Oral <User Schedule>      PHYSICAL EXAM:   Vital Signs Last 24 Hrs  T(C): 37.3 (29 May 2022 20:00), Max: 37.3 (29 May 2022 14:00)  T(F): 99.1 (29 May 2022 20:00), Max: 99.1 (29 May 2022 14:00)  HR: 71 (30 May 2022 06:00) (63 - 75)  BP: 147/66 (30 May 2022 06:00) (147/58 - 169/77)  BP(mean): 84 (30 May 2022 06:00) (81 - 114)  RR: 17 (30 May 2022 06:00) (12 - 25)  SpO2: 95% (30 May 2022 06:00) (95% - 100%)    General: No acute distress  HEENT: EOM intact, BTT less on R  Abdomen: Soft, nontender, nondistended   Extremities: No edema    NEUROLOGICAL EXAM:  Mental status: Eyes open, awake, alert, oriented to self, date and hospital, speech moderately dysfluent,  follows cross commands, repetition intact  Cranial Nerves: Subtle right facial droop, no nystagmus, mild dysarthria,  tongue midline  Motor exam: Normal tone, Right hemiparesis RUE subtle drift, prox 4/5, distally 5/5, RLE prox 4/5, distally 5/5,  mildly slow fine finger movements to right hand, LUE 5/5, LLE 5/5   Sensation: Intact to light touch   Coordination/ Gait: No dysmetria, gait deferred     LABS:                        13.1   9.78  )-----------( 286      ( 30 May 2022 05:36 )             38.5    05-30    141  |  107  |  16  ----------------------------<  114<H>  3.7   |  22  |  0.96    Ca    9.0      30 May 2022 05:36  Phos  3.1     05-29  Mg     1.9     05-29          IMAGING: Reviewed by me.       MRI Head (05/27): No change in left frontal infarct compared with 5/26/2022. Improved flow in the left internal carotid artery and left anterior cerebral artery compared with 5/23/2022. Only mildly improved flow in the left MCA is related to the presence of a left M1 stenosis.    MRI HEAD 05/26/22 Post angioplasty and stenting: Again identified is an acute/subacute  left MCA territory infarct. There is no gross infarct extension or hemorrhagic conversion    MR Head/MRA Head and Neck (5/23/22): Acute left middle cerebral artery infarct similar to that predicted on the CT perfusion. No hemorrhage. Marked narrowing of the left supraclinoid internal carotid artery with diminished antegrade flow in the left anterior and middle cerebral arteries. No significant cross-filling from the right anterior communicating artery or the left posterior communicating artery. Markedly elevated flow in the left posterior cerebral artery suggesting chronicity with collateralization. Noninvasive flow MR angiography     5/22/22:  CT PERFUSION: Large ischemic penumbra in the left middle cerebral artery territory with mismatch volume of 12.7.    CTA COW:   Diminished opacification of the left internal carotid artery, with focal severe narrowing of the left supraclinoid ICA extending to the carotid terminus, as well as diminished opacification of the left anterior cerebral artery and diminished caliber and opacification of left middle cerebral artery branches, including markedly severe effacement of the left M1 segment. No vessel cut off.    CTA NECK:   Patent, ECAs, ICAs, although diffusely decreased caliber of the left internal carotid artery extending to the skull base, cannot exclude a dissection. Atherosclerotic plaque at both carotid bifurcations with less than 50% stenosis at the ICA origins by NASCET criteria. Bilateral vertebral arteries are patent without flow limiting stenosis.    CTH 5/22/22:  Subacute left middle cerebral artery territory infarct in the frontal lobe. No evidence for hemorrhagic conversion.   THE PATIENT WAS SEEN AND EXAMINED BY ME WITH THE HOUSESTAFF AND STROKE TEAM DURING MORNING ROUNDS.   HPI:  67 year old left-handed male with medical history of macular degeneration (no vision loss) and EtOH use (daily 6-7 beers/day, last drink 2 days ago) who presents as a transfer from [facility] for tertiary stroke treatment.  Family at bedside to corroborate story.  Patient flew back from [place] Saturday (5/21) morning and took a cab home.  He said he "didn't feel good".  Later in the day, he was speaking one word sentences.  On morning of admission when he woke up, family noted that he could not complete a full sentence.  Initial CTH 5/22/2022 with subacute left MCA territory infarct in the frontal lobe.  CTA with severe focal narrowing of left supraclinoid ICA extending to the carotid terminus, as well as the diminished opacification of the left anterior cerebral artery.  CTP with large penumbra in left MCA territory with mismatch volume of 12.7cc.  Patient transferred to [facility].  Stroke score of 1 for mild non-fluent aphasia. NIHSS 1 preMRS 0    SUBJECTIVE: No events overnight.  No new neurologic complaints.  ROS reported negative unless otherwise noted.    acetaminophen     Tablet .. 650 milliGRAM(s) Oral every 6 hours PRN  aspirin 325 milliGRAM(s) Oral daily  atorvastatin 80 milliGRAM(s) Oral at bedtime  enoxaparin Injectable 40 milliGRAM(s) SubCutaneous <User Schedule>  folic acid 1 milliGRAM(s) Oral daily  latanoprost 0.005% Ophthalmic Solution 1 Drop(s) Both EYES at bedtime  lisinopril 5 milliGRAM(s) Oral daily  multivitamin 1 Tablet(s) Oral daily  polyethylene glycol 3350 17 Gram(s) Oral two times a day  senna 2 Tablet(s) Oral at bedtime  ticagrelor 60 milliGRAM(s) Oral <User Schedule>      PHYSICAL EXAM:   Vital Signs Last 24 Hrs  T(C): 37.3 (29 May 2022 20:00), Max: 37.3 (29 May 2022 14:00)  T(F): 99.1 (29 May 2022 20:00), Max: 99.1 (29 May 2022 14:00)  HR: 71 (30 May 2022 06:00) (63 - 75)  BP: 147/66 (30 May 2022 06:00) (147/58 - 169/77)  BP(mean): 84 (30 May 2022 06:00) (81 - 114)  RR: 17 (30 May 2022 06:00) (12 - 25)  SpO2: 95% (30 May 2022 06:00) (95% - 100%)    General: No acute distress  HEENT: EOM intact, BTT less on R  Abdomen: Soft, nontender, nondistended   Extremities: No edema    NEUROLOGICAL EXAM:  Mental status: Eyes open, awake, alert, oriented to self, date and hospital, speech moderately dysfluent,  follows cross commands, repetition intact  Cranial Nerves: Subtle right facial droop, no nystagmus, mild dysarthria,  tongue midline  Motor exam: Normal tone, Right hemiparesis  subtle drift, prox 4+/5, distally 5/5, RLE prox 4/5, distally 5/5,  mildly slow fine finger movements to right hand, LUE 5/5, LLE 5/5   Sensation: Intact to light touch   Coordination/ Gait: No dysmetria, gait deferred     LABS:                        13.1   9.78  )-----------( 286      ( 30 May 2022 05:36 )             38.5    05-30    141  |  107  |  16  ----------------------------<  114<H>  3.7   |  22  |  0.96    Ca    9.0      30 May 2022 05:36  Phos  3.1     05-29  Mg     1.9     05-29          IMAGING: Reviewed by me.       MRI Head (05/27): No change in left frontal infarct compared with 5/26/2022. Improved flow in the left internal carotid artery and left anterior cerebral artery compared with 5/23/2022. Only mildly improved flow in the left MCA is related to the presence of a left M1 stenosis.    MRI HEAD 05/26/22 Post angioplasty and stenting: Again identified is an acute/subacute  left MCA territory infarct. There is no gross infarct extension or hemorrhagic conversion    MR Head/MRA Head and Neck (5/23/22): Acute left middle cerebral artery infarct similar to that predicted on the CT perfusion. No hemorrhage. Marked narrowing of the left supraclinoid internal carotid artery with diminished antegrade flow in the left anterior and middle cerebral arteries. No significant cross-filling from the right anterior communicating artery or the left posterior communicating artery. Markedly elevated flow in the left posterior cerebral artery suggesting chronicity with collateralization. Noninvasive flow MR angiography     5/22/22:  CT PERFUSION: Large ischemic penumbra in the left middle cerebral artery territory with mismatch volume of 12.7.    CTA COW:   Diminished opacification of the left internal carotid artery, with focal severe narrowing of the left supraclinoid ICA extending to the carotid terminus, as well as diminished opacification of the left anterior cerebral artery and diminished caliber and opacification of left middle cerebral artery branches, including markedly severe effacement of the left M1 segment. No vessel cut off.    CTA NECK:   Patent, ECAs, ICAs, although diffusely decreased caliber of the left internal carotid artery extending to the skull base, cannot exclude a dissection. Atherosclerotic plaque at both carotid bifurcations with less than 50% stenosis at the ICA origins by NASCET criteria. Bilateral vertebral arteries are patent without flow limiting stenosis.    CTH 5/22/22:  Subacute left middle cerebral artery territory infarct in the frontal lobe. No evidence for hemorrhagic conversion.

## 2022-05-30 NOTE — PROGRESS NOTE ADULT - SUBJECTIVE AND OBJECTIVE BOX
CC: f/u for fever, leukocytosis    Patient reports  he is ok  REVIEW OF SYSTEMS:  All other review of systems negative (Comprehensive ROS)    Antimicrobials Day #  :    Other Medications Reviewed    T(F): 98.8 (05-30-22 @ 08:00), Max: 99.1 (05-29-22 @ 20:00)  HR: 68 (05-30-22 @ 14:00)  BP: 114/63 (05-30-22 @ 14:00)  RR: 19 (05-30-22 @ 14:00)  SpO2: 99% (05-30-22 @ 14:00)  Wt(kg): --    PHYSICAL EXAM:  General: alert, no acute distress  Eyes:  anicteric, no conjunctival injection, no discharge  Oropharynx: no lesions or injection 	  Neck: supple, without adenopathy  Lungs: clear to auscultation  Heart: regular rate and rhythm; no murmur, rubs or gallops  Abdomen: soft, nondistended, nontender, without mass or organomegaly  Skin: no lesions  Extremities: no clubbing, cyanosis, or edema  Neurologic: alert, oriented, moves all extremities, some word finding trouble    LAB RESULTS:                        13.1   9.78  )-----------( 286      ( 30 May 2022 05:36 )             38.5     05-30    141  |  107  |  16  ----------------------------<  114<H>  3.7   |  22  |  0.96    Ca    9.0      30 May 2022 05:36  Phos  3.1     05-29  Mg     1.9     05-29          MICROBIOLOGY:  RECENT CULTURES:  05-27 @ 10:13 .Blood     Babesia microti PCR  Results: NOT detected  ***************Result Note*************  The detection of Babesia microti by PCR has only been  validated for whole blood; this test has not been approved  by the US Food and Drug Administration (FDA). Performance  characteristics of this assay have been determined by  [employer]. The clinical significance  of results should be considered in conjunction with the  overall clinical presentation of the patient. Result is not  intended to be used as the sole means for clinical diagnosis  or patient management decisions.  One negative sample does not necessarily rule  out the presence of a parasitic infection.      05-26 @ 17:28 .Blood Blood     NEGATIVE for Plasmodium antigens. Microscopy is performed for  confirmation.  This test does not detect the presence of Babesia species.  If Babesiosis is suspected, please order test for Babesia PCR: Babesia  microti PCR Bld  ************************************************************  No Blood Parasites observed by giemsa stain  One negative set of blood smears does not rule out  the possibility of a parasitic infection.  A minimum of 3  specimens should be collected, at least 12-24 hours apart,  over a 36 hour time period.          RADIOLOGY REVIEWED:    < from: MR Head No Cont (05.27.22 @ 13:28) >    ACC: [id number] EXAM:  MR BRAIN                        ACC: [id number] EXAM:  MR BRAIN WAW IC                          PROCEDURE DATE:  05/27/2022          INTERPRETATION:  CLINICAL INDICATION: Post angioplasty and stenting of   left supraclinoid internal carotid artery stenosis      Magnetic resonance imaging of the brain was carried out with transaxial   SPGR, FLAIR, fast spin echo T2 weighted images, axial susceptibility   weighted series, diffusion weighted series and sagittal T1 weighted   series on a 1.5 [place] magnet.    Comparison is made with the prior MRI/MRA of 5/23/2022, MRI and   conventional angiogram 5/26/2022.    The fourth, third and lateral ventricles are normal in size and position.   There is no hemorrhage, mass or shift of the midline structures. Left   frontal infarct is unchanged since 5/26/2022. Minimal linear   susceptibility is identified suggesting either a thrombosed vessel or   hemorrhage. No new infarcts are identified.        A 2 D and 3-D axial noncontrast MRAwere performed on the cervical and   intracranial vessels, respectively. Intravascular flow quantification was   performed using gated 2D phase contrast MR, imaged perpendicular to the   vessel axis.  Images were post processed NOVA software and a NOVA flow   study report is available.    In the interval since the prior exam there has been a stent placed in the   left supraclinoid internal carotid artery with significantly improved   flow in the left internal carotid artery as well as the left anterior   cerebral artery, flow in the middle cerebral artery is only slightly   improved which may be related to a left M1 stenosis.    MARIJA 187, RMCA 141, RACA 11, RACA2 72  compared with 5/23/2022  MARIJA 191, RMCA 117, RACA 16, RACA2 71.    LICA 148,LMCA 44, LACA 134, LACA2 78  compared with 5/23/2022  LICA 62, LMCA 36, LACA 35, LACA 2 30.    , , , RPCA 63, LPCA 131  compared with 5/23/2022  , , , RPCA 66, LPCA 194    IMPRESSION: No change in left frontal infarct compared with 5/26/2022.   Improved flow in the left internal carotid artery and left anterior   cerebral artery compared with 5/23/2022. Only mildly improved flow in the   left MCA is related to the presence of a left M1 stenosis.    --- End of Report ---          < end of copied text >            Assessment:  Patient s/p cva after returning from 5 months in [facility], had angiogram and carotid stent placed. Had some low grade temps and high wbc all moderated. No infection is apparent at present. Blood cx neg, malaria neg  Plan:  monitor off antibiotics  f/u final cx

## 2022-05-30 NOTE — CONSULT NOTE ADULT - SUBJECTIVE AND OBJECTIVE BOX
CHIEF COMPLAINT:    HISTORY OF PRESENT ILLNESS:    67 year old left-handed male with medical history of macular degeneration (no vision loss) and EtOH use (daily 6-7 beers/day, last drink 2 days ago) who presents as a transfer from [facility] for tertiary stroke treatment.  Family at bedside to corroborate story.  Patient flew back from [place] Saturday (5/21) morning and took a cab home.  He said he "didn't feel good".  Later in the day, he was speaking one word sentences.  On morning of admission when he woke up, family noted that he could not complete a full sentence.  Initial CTH 5/22/2022 with subacute left MCA territory infarct in the frontal lobe.  CTA with severe focal narrowing of left supraclinoid ICA extending to the carotid terminus, as well as the diminished opacification of the left anterior cerebral artery.  CTP with large penumbra in left MCA territory with mismatch volume of 12.7cc.  Patient transferred to [facility].  Stroke score of 1 for mild non-fluent aphasia. NIHSS 1 preMRS 0      PAST MEDICAL & SURGICAL HISTORY:  Glaucoma      Osteoarthritis      Knee pain, left  s/p TKR 8/15      Elevated liver enzymes  s/p tylenol use after TKR      S/P knee surgery  left knee at age 15      S/P inguinal hernia repair  right      Status post total left knee replacement  8/2015      S/P colonoscopy with polypectomy  benign, 2 years ago              MEDICATIONS:  enoxaparin Injectable 40 milliGRAM(s) SubCutaneous <User Schedule>  lisinopril 5 milliGRAM(s) Oral daily  ticagrelor 60 milliGRAM(s) Oral <User Schedule>        acetaminophen     Tablet .. 650 milliGRAM(s) Oral every 6 hours PRN  aspirin 325 milliGRAM(s) Oral daily    polyethylene glycol 3350 17 Gram(s) Oral two times a day  senna 2 Tablet(s) Oral at bedtime    atorvastatin 80 milliGRAM(s) Oral at bedtime    folic acid 1 milliGRAM(s) Oral daily  latanoprost 0.005% Ophthalmic Solution 1 Drop(s) Both EYES at bedtime  multivitamin 1 Tablet(s) Oral daily      FAMILY HISTORY:  Family history of brain cancer (Mother)    Family history of heart disease (Father)  cancer?        SOCIAL HISTORY:    [ ] Non-smoker  [ ] Smoker  [ ] Alcohol    Allergies    No Known Allergies    Intolerances    	    REVIEW OF SYSTEMS:  CONSTITUTIONAL: No fever, weight loss,+ fatigue  EYES: No eye pain, visual disturbances, or discharge  ENMT:  No difficulty hearing, tinnitus, vertigo; No sinus or throat pain  NECK: No pain or stiffness  RESPIRATORY: No cough, wheezing, chills or hemoptysis; No Shortness of Breath  CARDIOVASCULAR: No chest pain, palpitations, passing out, dizziness, or leg swelling  GASTROINTESTINAL: No abdominal or epigastric pain. No nausea, vomiting, or hematemesis; No diarrhea or constipation. No melena or hematochezia.  GENITOURINARY: No dysuria, frequency, hematuria, or incontinence  NEUROLOGICAL: No headaches, memory loss,++ loss of strength, numbness, or tremors  SKIN: No itching, burning, rashes, or lesions   LYMPH Nodes: No enlarged glands  ENDOCRINE: No heat or cold intolerance; No hair loss  MUSCULOSKELETAL: No joint pain or swelling; No muscle, back, or extremity pain  PSYCHIATRIC: No depression, anxiety, mood swings, or difficulty sleeping  HEME/LYMPH: No easy bruising, or bleeding gums  ALLERY AND IMMUNOLOGIC: No hives or eczema	    [ ] All others negative	  [ ] Unable to obtain    PHYSICAL EXAM:  T(C): 37.1 (05-30-22 @ 08:00), Max: 37.3 (05-29-22 @ 14:00)  HR: 77 (05-30-22 @ 08:00) (63 - 77)  BP: 131/95 (05-30-22 @ 08:00) (131/95 - 169/77)  RR: 14 (05-30-22 @ 08:00) (12 - 25)  SpO2: 90% (05-30-22 @ 08:00) (90% - 100%)  Wt(kg): --  I&O's Summary    29 May 2022 07:01  -  30 May 2022 07:00  --------------------------------------------------------  IN: 0 mL / OUT: 1500 mL / NET: -1500 mL        Appearance: Normal	  HEENT:   Normal oral mucosa, PERRL, EOMI	  Lymphatic: No lymphadenopathy  Cardiovascular: Normal S1 S2, No JVD, No murmurs, No edema  Respiratory: Lungs clear to auscultation	  Psychiatry: A & O x 3, Mood & affect appropriate  Gastrointestinal:  Soft, Non-tender, + BS	  Skin: No rashes, No ecchymoses, No cyanosis	  Extremities: Normal range of motion, No clubbing, cyanosis or edema  Vascular: Peripheral pulses palpable 2+ bilaterally  NEUROLOGICAL EXAM:  Mental status: Eyes open, awake, alert, oriented to self, date and hospital, speech moderately dysfluent,  follows cross commands, repetition intact  Cranial Nerves: Subtle right facial droop, no nystagmus, mild dysarthria,  tongue midline  Motor exam: Normal tone, Right hemiparesis RUE subtle drift, prox 4/5, distally 5/5, RLE prox 4/5, distally 5/5,  mildly slow fine finger movements to right hand, LUE 5/5, LLE 5/5   Sensation: Intact to light touch   Coordination/ Gait: No dysmetria, gait deferred       TELEMETRY: 	  SR PACs   ECG:  	Sinus kristian first degree AVB   RADIOLOGY:  < from: CT Head No Cont (05.26.22 @ 09:03) >  ACC: [id number] EXAM:  CT BRAIN                          PROCEDURE DATE:  05/26/2022          INTERPRETATION:  Noncontrast CT of the brain.    CLINICAL INDICATION:  Follow-up left frontal infarct    TECHNIQUE : Axial CT scanning of the brain was obtained from the skull   base to the vertex without the administration of intravenous contrast.   Sagittal and coronal reformats were provided.    COMPARISON: CT brain 5/22/2022    FINDINGS:    Similar appearing acute left anterolateral frontal infarct. No CT   evidence for erik hemorrhagic transformation.    No hydrocephalus, midline shift, or effacement of basal cisterns.    IMPRESSION:    Similar appearing acute left anterolateral frontal infarct. No CT   evidence for erik hemorrhagic transformation.    --- End of Report ---      < end of copied text >  < from: CT Angio Neck w/ IV Cont (05.22.22 @ 11:20) >    ACC: [id number] EXAM:  CT ANGIO NECK (W)AW IC                        ACC: [id number] EXAM:  CT PERFUSION W MAPS IC                        ACC: [id number] EXAM:  CT ANGIO BRAIN (W)AW IC                          PROCEDURE DATE:  05/22/2022          INTERPRETATION:  CT PERFUSION, CTA OF THE [place] AND NECK:    INDICATIONS: Stroke code.    TECHNIQUE:    CTA [place]:    After the intravenous power injection of non-ionic contrast material,   serial thin sections were obtained through theintracranial circulation   on a multislice CT scanner. RAPID artificial intelligence was used for   perfusion analysis and for preliminary evaluation of intracranial   hemorrhage. Images were reformatted using a dedicated 3D software package   and viewed on a dedicated workstation in multiple planes.    CTA NECK:    After the intravenous power injection of non-ionic contrast material,   serial thin sections were obtained through the cervical circulation on a   multislice CT scanner.  Images were reformatted using a dedicated 3D   software package and viewed on a dedicated workstation in multiple planes.    A total 150 cc of Omnipaque 350 were administered intravenously; 0 cc   were discarded.      COMPARISON EXAMINATION: Noncontrast head CT performed the same day.    FINDINGS:    CT RAPID PERFUSION:    INFARCT CORE: 9 cc    TISSUE AT RISK: 105 cc    MISMATCH RATIO: 12.7      CTA [place]:    ANTERIOR CIRCULATION    ICA  CAVERNOUS, SUPRACLINOID, BIFURCATION SEGMENTS/ANTERIOR & MIDDLE   CEREBRAL ARTERIES: Patent, although diminished opacification of the left   cavernous and supraclinoid internal carotid artery segments with focal   severe narrowing at the left supraclinoid ICA extending to the ICA   segment. Decreased opacification of the left anterior cerebral artery A1   and A2 branches, as well as decreased caliber and opacification of left   middle cerebral artery M1, M2, and M3 branches, noting especially marked   effacement of the left M1 segment. Scattered atherosclerotic   calcifications of the right cavernous and supraclinoid ICA with mild   right supraclinoid ICA stenosis. The major branches of the right anterior   and right middle cerebral arteries are patent without flow limiting   stenosis.    POSTERIOR CIRCULATION:    VERTEBRAL ARTERIES: Patent without flow limiting stenosis    BASILAR ARTERY: Patent no flow limiting stenosis.    POSTERIOR CEREBRAL ARTERIES: Patent without flow limiting stenosis. Mild   irregular narrowing of the right posterior cerebral artery P1 and P2   segments.    CTA NECK:    GREAT VESSELS: Visualized segments are patent, no flow limiting stenosis.    COMMON CAROTID ARTERIES:  RIGHT: Patent without flow limiting stenosis  LEFT: Patent without flow limiting stenosis    CAROTID BULBS:  RIGHT: Patent without flow limiting stenosis  LEFT: Patent without flow limiting stenosis    INTERNAL CAROTID ARTERIES:  RIGHT: Patent. Calcified and noncalcified plaque at the bifurcation and   proximal cervical ICA with less than 50% stenosis by NASCET criteria.  LEFT: Diffusely decreased caliber of the cervical internal carotid artery   extending to the skull base, cannot rule out dissection. Calcified and   noncalcified plaque at the bifurcation and proximal cervical ICA with   less than 50% stenosis by NASCET criteria.    VERTEBRAL ARTERIES:    RIGHT: Patent no evidence for any flow limiting stenosis.  LEFT: Patent no evidence for any flow limiting stenosis.      SOFT TISSUES: Unremarkable    BONES: Multilevel degenerative changesin the spine.    IMPRESSION:    CT PERFUSION demonstrated: Large ischemic penumbra in the left middle   cerebral artery territory with mismatch volume of 12.7.    CTA COW: Diminished opacification of the left internal carotid artery,   with focal severe narrowing of the left supraclinoid ICA extending to the   carotid terminus, as well as diminished opacification of the left   anterior cerebral artery and diminished caliber and opacification of left   middle cerebral artery branches, including markedly severe effacement of   the left M1 segment. No vessel cut off.    CTA NECK: Patent, ECAs, ICAs, although diffusely decreased caliber of the   left internal carotid artery extending to the skull base, cannot exclude   a dissection. Atherosclerotic plaque at both carotid bifurcations with   less than 50% stenosis at the ICA origins by NASCET criteria. Bilateral   vertebral arteries are patent without flow limiting stenosis.    --- End of Report ---            AMY SHAW MD; Attending Radiologist  This document has been electronically signed. May 22 2022 11:40AM    < end of copied text >  < from: MR Head No Cont (05.27.22 @ 13:28) >    ACC: [id number] EXAM:  MR BRAIN                        ACC: [id number] EXAM:  MR BRAIN WAW IC                          PROCEDURE DATE:  05/27/2022          INTERPRETATION:  CLINICAL INDICATION: Post angioplasty and stenting of   left supraclinoid internal carotid artery stenosis      Magnetic resonance imaging of the brain was carried out with transaxial   SPGR, FLAIR, fast spin echo T2 weighted images, axial susceptibility   weighted series, diffusion weighted series and sagittal T1 weighted   series on a 1.5 [place] magnet.    Comparison is made with the prior MRI/MRA of 5/23/2022, MRI and   conventional angiogram 5/26/2022.    The fourth, third and lateral ventricles are normal in size and position.   There is no hemorrhage, mass or shift of the midline structures. Left   frontal infarct is unchanged since 5/26/2022. Minimal linear   susceptibility is identified suggesting either a thrombosed vessel or   hemorrhage. No new infarcts are identified.        A 2 D and 3-D axial noncontrast MRAwere performed on the cervical and   intracranial vessels, respectively. Intravascular flow quantification was   performed using gated 2D phase contrast MR, imaged perpendicular to the   vessel axis.  Images were post processed NOVA software and a NOVA flow   study report is available.    In the interval since the prior exam there has been a stent placed in the   left supraclinoid internal carotid artery with significantly improved   flow in the left internal carotid artery as well as the left anterior   cerebral artery, flow in the middle cerebral artery is only slightly   improved which may be related to a left M1 stenosis.    MACKENZIE 187, RMCA 141, RACA 11, RACA2 72  compared with 5/23/2022  MACKENZIE 191, RMCA 117, RACA 16, RACA2 71.    LICA 148,LMCA 44, LACA 134, LACA2 78  compared with 5/23/2022  LICA 62, LMCA 36, LACA 35, LACA 2 30.    , , , RPCA 63, LPCA 131  compared with 5/23/2022  , , , RPCA 66, LPCA 194    IMPRESSION: No change in left frontal infarct compared with 5/26/2022.   Improved flow in the left internal carotid artery and left anterior   cerebral artery compared with 5/23/2022. Only mildly improved flow in the   left MCA is related to the presence of a left M1 stenosis.    --- End of Report ---            EARL KELLEY MD; Attending Radiologist  This document has been electronically signed. May 27 2022  1:27PM    < end of copied text >  < from: Transthoracic Echocardiogram (05.23.22 @ 10:17) >    Patient name: CAROLINE ROBERTO  YOB: 1955   Age: 67 (M)   MR#: [id number]  Study Date: 5/23/2022  Location: [address] [place], [place] [id number]: Layne Her RDCS  Study quality: Technically fair  Referring Physician: Jose Morales MD  Blood Pressure: 174/79 mmHg  Height: 183 cm  Weight: 91 kg  BSA: 2.1 m2  Heart Rhythm: Normal sinus  Heart Rate: 53 mmHg  ------------------------------------------------------------------------  PROCEDURE: Transthoracic echocardiogram with 2-D, M-Mode  and complete spectral and color flow Doppler.  INDICATION: Cerebral infarction, unspecified (I63.9)  ------------------------------------------------------------------------  Dimensions:    Normal Values:  LA:     3.8    2.0 - 4.0 cm  Ao:     3.0    2.0 - 3.8cm  SEPTUM: 1.0    0.6 - 1.2 cm  PWT:    1.1    0.6 - 1.1 cm  LVIDd:  4.2    3.0 - 5.6 cm  LVIDs:  2.9    1.8 - 4.0 cm  Derived variables:  LVMI: 69 g/m2  RWT: 0.52  EF (Visual Estimate): 65 %  Doppler Peak Velocity (m/sec): AoV=1.1 TV=2.4  ------------------------------------------------------------------------  Observations:  Mitral Valve: Mitral annular calcification. Minimal mitral  regurgitation.  Aortic Valve/Aorta: Mildly calcified aortic valve with  normal opening.  Normal aortic root size.  Left Atrium: Normal left atrium.  Left Ventricle: Normal left ventricular internal dimensions  and wall thicknesses.  Normal left ventricular systolic function. No segmental  wall motion abnormalities.  Impaired LV-relaxation with normal filling pressure.  Right Heart: Normal right atrium. Normal right ventricular  size and function.  Normal tricuspid valve. Minimal tricuspid regurgitation.  Normal pulmonic valve. Minimal pulmonic regurgitation.  Pericardium/Pleura: Normal pericardium with no pericardial  effusion.  Hemodynamic: Estimated right atrial pressure is normal.  No evidence of pulmonary hypertension.  ------------------------------------------------------------------------  Conclusions:  Normal left ventricular systolic function.No segmental  wall motion abnormalities.  ------------------------------------------------------------------------  Confirmed on  5/23/2022 - 18:21:28 by El Medina MD, FASE  ------------------------------------------------------------------------    < end of copied text >    OTHER: 	  	  LABS:	 	    CARDIAC MARKERS:                                  13.1   9.78  )-----------( 286      ( 30 May 2022 05:36 )             38.5     05-30    141  |  107  |  16  ----------------------------<  114<H>  3.7   |  22  |  0.96    Ca    9.0      30 May 2022 05:36  Phos  3.1     05-29  Mg     1.9     05-29      proBNP:   Lipid Profile:   HgA1c:   TSH:

## 2022-05-31 ENCOUNTER — TRANSCRIPTION ENCOUNTER (OUTPATIENT)
Age: 67
End: 2022-05-31

## 2022-05-31 LAB
ANION GAP SERPL CALC-SCNC: 13 MMOL/L — SIGNIFICANT CHANGE UP (ref 5–17)
BUN SERPL-MCNC: 20 MG/DL — SIGNIFICANT CHANGE UP (ref 7–23)
CALCIUM SERPL-MCNC: 8.8 MG/DL — SIGNIFICANT CHANGE UP (ref 8.4–10.5)
CHLORIDE SERPL-SCNC: 107 MMOL/L — SIGNIFICANT CHANGE UP (ref 96–108)
CO2 SERPL-SCNC: 21 MMOL/L — LOW (ref 22–31)
CREAT SERPL-MCNC: 0.92 MG/DL — SIGNIFICANT CHANGE UP (ref 0.5–1.3)
EGFR: 90 ML/MIN/1.73M2 — SIGNIFICANT CHANGE UP
GLUCOSE SERPL-MCNC: 116 MG/DL — HIGH (ref 70–99)
HCT VFR BLD CALC: 39.8 % — SIGNIFICANT CHANGE UP (ref 39–50)
HGB BLD-MCNC: 13.5 G/DL — SIGNIFICANT CHANGE UP (ref 13–17)
MCHC RBC-ENTMCNC: 31 PG — SIGNIFICANT CHANGE UP (ref 27–34)
MCHC RBC-ENTMCNC: 33.9 GM/DL — SIGNIFICANT CHANGE UP (ref 32–36)
MCV RBC AUTO: 91.3 FL — SIGNIFICANT CHANGE UP (ref 80–100)
NRBC # BLD: 0 /100 WBCS — SIGNIFICANT CHANGE UP (ref 0–0)
PLATELET # BLD AUTO: 319 K/UL — SIGNIFICANT CHANGE UP (ref 150–400)
POTASSIUM SERPL-MCNC: 3.7 MMOL/L — SIGNIFICANT CHANGE UP (ref 3.5–5.3)
POTASSIUM SERPL-SCNC: 3.7 MMOL/L — SIGNIFICANT CHANGE UP (ref 3.5–5.3)
RBC # BLD: 4.36 M/UL — SIGNIFICANT CHANGE UP (ref 4.2–5.8)
RBC # FLD: 13 % — SIGNIFICANT CHANGE UP (ref 10.3–14.5)
SARS-COV-2 RNA SPEC QL NAA+PROBE: SIGNIFICANT CHANGE UP
SARS-COV-2 RNA SPEC QL NAA+PROBE: SIGNIFICANT CHANGE UP
SODIUM SERPL-SCNC: 141 MMOL/L — SIGNIFICANT CHANGE UP (ref 135–145)
WBC # BLD: 11.65 K/UL — HIGH (ref 3.8–10.5)
WBC # FLD AUTO: 11.65 K/UL — HIGH (ref 3.8–10.5)

## 2022-05-31 PROCEDURE — 99232 SBSQ HOSP IP/OBS MODERATE 35: CPT

## 2022-05-31 PROCEDURE — 99233 SBSQ HOSP IP/OBS HIGH 50: CPT

## 2022-05-31 RX ORDER — TAMSULOSIN HYDROCHLORIDE 0.4 MG/1
0.4 CAPSULE ORAL AT BEDTIME
Refills: 0 | Status: DISCONTINUED | OUTPATIENT
Start: 2022-05-31 | End: 2022-05-31

## 2022-05-31 RX ADMIN — TICAGRELOR 60 MILLIGRAM(S): 90 TABLET ORAL at 00:59

## 2022-05-31 RX ADMIN — TICAGRELOR 60 MILLIGRAM(S): 90 TABLET ORAL at 13:04

## 2022-05-31 RX ADMIN — LATANOPROST 1 DROP(S): 0.05 SOLUTION/ DROPS OPHTHALMIC; TOPICAL at 22:14

## 2022-05-31 RX ADMIN — Medication 1 TABLET(S): at 12:17

## 2022-05-31 RX ADMIN — Medication 1 MILLIGRAM(S): at 12:17

## 2022-05-31 RX ADMIN — ENOXAPARIN SODIUM 40 MILLIGRAM(S): 100 INJECTION SUBCUTANEOUS at 17:07

## 2022-05-31 RX ADMIN — ATORVASTATIN CALCIUM 80 MILLIGRAM(S): 80 TABLET, FILM COATED ORAL at 22:14

## 2022-05-31 RX ADMIN — SENNA PLUS 2 TABLET(S): 8.6 TABLET ORAL at 22:14

## 2022-05-31 RX ADMIN — LISINOPRIL 5 MILLIGRAM(S): 2.5 TABLET ORAL at 05:41

## 2022-05-31 RX ADMIN — Medication 325 MILLIGRAM(S): at 12:17

## 2022-05-31 NOTE — DISCHARGE NOTE PROVIDER - NSDCMRMEDTOKEN_GEN_ALL_CORE_FT
latanoprost 0.005% ophthalmic solution: 1 drop(s) to each affected eye once a day (at bedtime)   aspirin 325 mg oral tablet: 1 tab(s) orally once a day  atorvastatin 80 mg oral tablet: 1 tab(s) orally once a day (at bedtime)  enoxaparin: 40 milligram(s) subcutaneous once a day (at bedtime)  folic acid 1 mg oral tablet: 1 tab(s) orally once a day  latanoprost 0.005% ophthalmic solution: 1 drop(s) to each affected eye once a day (at bedtime)  lisinopril 5 mg oral tablet: 1 tab(s) orally once a day  Multiple Vitamins oral tablet: 1 tab(s) orally once a day  polyethylene glycol 3350 oral powder for reconstitution: 17 gram(s) orally 2 times a day  senna oral tablet: 2 tab(s) orally once a day (at bedtime)  ticagrelor 60 mg oral tablet: 1 tab(s) orally 2 times a day   aspirin 81 mg oral tablet, chewable: 1 tab(s) orally once a day  atorvastatin 80 mg oral tablet: 1 tab(s) orally once a day (at bedtime)  enoxaparin: 40 milligram(s) subcutaneous once a day (at bedtime)  folic acid 1 mg oral tablet: 1 tab(s) orally once a day  lisinopril 5 mg oral tablet: 1 tab(s) orally once a day  Multiple Vitamins oral tablet: 1 tab(s) orally once a day  polyethylene glycol 3350 oral powder for reconstitution: 17 gram(s) orally 2 times a day  senna oral tablet: 2 tab(s) orally once a day (at bedtime)  ticagrelor 60 mg oral tablet: 1 tab(s) orally 2 times a day   aspirin 81 mg oral tablet, chewable: 1 tab(s) orally once a day  atorvastatin 80 mg oral tablet: 1 tab(s) orally once a day (at bedtime)  enoxaparin: 40 milligram(s) subcutaneous once a day (at bedtime)  folic acid 1 mg oral tablet: 1 tab(s) orally once a day  lisinopril 10 mg oral tablet: 1 tab(s) orally once a day  Multiple Vitamins oral tablet: 1 tab(s) orally once a day  polyethylene glycol 3350 oral powder for reconstitution: 17 gram(s) orally 2 times a day  senna oral tablet: 2 tab(s) orally once a day (at bedtime)  ticagrelor 60 mg oral tablet: 1 tab(s) orally 2 times a day   aspirin 81 mg oral tablet, chewable: 1 tab(s) orally once a day  atorvastatin 80 mg oral tablet: 1 tab(s) orally once a day (at bedtime)  lisinopril 10 mg oral tablet: 1 tab(s) orally once a day  Rolling Walker: Dx: stroke  ticagrelor 60 mg oral tablet: 1 tab(s) orally 2 times a day

## 2022-05-31 NOTE — PROGRESS NOTE ADULT - SUBJECTIVE AND OBJECTIVE BOX
Patient w no new c/os  Tolerating therapies.   No CP, no SOB  Cleared by ID    MEDICATIONS  (STANDING):  aspirin 325 milliGRAM(s) Oral daily  atorvastatin 80 milliGRAM(s) Oral at bedtime  enoxaparin Injectable 40 milliGRAM(s) SubCutaneous <User Schedule>  folic acid 1 milliGRAM(s) Oral daily  latanoprost 0.005% Ophthalmic Solution 1 Drop(s) Both EYES at bedtime  lisinopril 5 milliGRAM(s) Oral daily  multivitamin 1 Tablet(s) Oral daily  polyethylene glycol 3350 17 Gram(s) Oral two times a day  senna 2 Tablet(s) Oral at bedtime  ticagrelor 60 milliGRAM(s) Oral <User Schedule>    MEDICATIONS  (PRN):  acetaminophen     Tablet .. 650 milliGRAM(s) Oral every 6 hours PRN Temp greater or equal to 38C (100.4F), Mild Pain (1 - 3)    Vital Signs Last 24 Hrs  T(C): 37.1 (31 May 2022 08:00), Max: 37.2 (31 May 2022 05:51)  T(F): 98.8 (31 May 2022 08:00), Max: 98.9 (31 May 2022 05:51)  HR: 67 (31 May 2022 08:00) (67 - 72)  BP: 129/67 (31 May 2022 08:00) (114/63 - 164/80)  BP(mean): 82 (31 May 2022 08:00) (71 - 103)  RR: 18 (31 May 2022 08:00) (15 - 23)  SpO2: 96% (31 May 2022 08:00) (96% - 100%)      PHYSICAL EXAM  Constitutional - NAD, Comfortable  HEENT - Small eccymoses R eye, EOMI  Chest - Breathing comfortably, No wheezing  Cardiovascular - RRR, S1S2   Abdomen - Soft   Extremities - No C/C/E, No calf tenderness   Neurologic Exam -                 Alert  Follows verbal instruction  + non-fluent aphasia  motor 4/5 RUE/RLE; 4+/5 LUE/LLE  no clonus                 Psychiatric - Mood stable, Affect WNL                                               13.5   11.65 )-----------( 319      ( 31 May 2022 06:08 )             39.8         05-31    141  |  107  |  20  ----------------------------<  116<H>  3.7   |  21<L>  |  0.92    Ca    8.8      31 May 2022 06:06

## 2022-05-31 NOTE — PROGRESS NOTE ADULT - SUBJECTIVE AND OBJECTIVE BOX
THE PATIENT WAS SEEN AND EXAMINED BY ME WITH THE HOUSESTAFF AND STROKE TEAM DURING MORNING ROUNDS.   HPI:67 year old left-handed male with medical history of macular degeneration (no vision loss) and EtOH use (daily 6-7 beers/day, last drink 2 days ago) who presents as a transfer from [facility] for tertiary stroke treatment.  Family at bedside to corroborate story.  Patient flew back from [place] Saturday (5/21) morning and took a cab home.  He said he "didn't feel good".  Later in the day, he was speaking one word sentences.  This morning when he woke up, family noted that he could not complete a full sentence.  Initial CTH 5/22/2022 with subacute left MCA territory infarct in the frontal lobe.  CTA with severe focal narrowing of left supraclinoid ICA extending to the carotid terminus, as well as the diminished opacification of the left anterior cerebral artery.  CTP with large penumbra in left MCA territory with mismatch volume of 12.7cc.  Patient transferred to [facility].  Stroke score of 1 for mild non-fluent aphasia. NIHSS 1, preMRS 0.    SUBJECTIVE: No events overnight.  No new neurologic complaints.      acetaminophen     Tablet .. 650 milliGRAM(s) Oral every 6 hours PRN  aspirin 325 milliGRAM(s) Oral daily  atorvastatin 80 milliGRAM(s) Oral at bedtime  enoxaparin Injectable 40 milliGRAM(s) SubCutaneous <User Schedule>  folic acid 1 milliGRAM(s) Oral daily  latanoprost 0.005% Ophthalmic Solution 1 Drop(s) Both EYES at bedtime  lisinopril 5 milliGRAM(s) Oral daily  multivitamin 1 Tablet(s) Oral daily  polyethylene glycol 3350 17 Gram(s) Oral two times a day  senna 2 Tablet(s) Oral at bedtime  ticagrelor 60 milliGRAM(s) Oral <User Schedule>    PHYSICAL EXAM:   Vital Signs Last 24 Hrs  T(C): 37.2 (31 May 2022 05:51), Max: 37.2 (31 May 2022 05:51)  T(F): 98.9 (31 May 2022 05:51), Max: 98.9 (31 May 2022 05:51)  HR: 68 (31 May 2022 06:00) (67 - 72)  BP: 132/69 (31 May 2022 06:00) (113/89 - 164/80)  BP(mean): 81 (31 May 2022 06:00) (71 - 103)  RR: 15 (31 May 2022 06:00) (15 - 23)  SpO2: 97% (31 May 2022 06:00) (97% - 100%)    General: No acute distress  HEENT: EOM intact, BTT less on R  Abdomen: Soft, nontender, nondistended   Extremities: No edema    NEUROLOGICAL EXAM:  Mental status: Eyes open, awake, alert, oriented to self, date and hospital, speech moderately dysfluent,  follows cross commands, repetition intact  Cranial Nerves: Subtle right facial droop, no nystagmus, mild dysarthria,  tongue midline  Motor exam: Normal tone, Right hemiparesis  subtle drift, prox 4+/5, distally 5/5, RLE prox 4/5, distally 5/5,  mildly slow fine finger movements to right hand, LUE 5/5, LLE 5/5   Sensation: Intact to light touch   Coordination/ Gait: No dysmetria, gait deferred     LABS:                        13.5   11.65 )-----------( 319      ( 31 May 2022 06:08 )             39.8    05-31    141  |  107  |  20  ----------------------------<  116<H>  3.7   |  21<L>  |  0.92    Ca    8.8      31 May 2022 06:06      IMAGING: Reviewed by me.     MRI Head (05/27): No change in left frontal infarct compared with 5/26/2022. Improved flow in the left internal carotid artery and left anterior cerebral artery compared with 5/23/2022. Only mildly improved flow in the left MCA is related to the presence of a left M1 stenosis.    MRI HEAD 05/26/22 Post angioplasty and stenting: Again identified is an acute/subacute  left MCA territory infarct. There is no gross infarct extension or hemorrhagic conversion    MR Head/MRA Head and Neck (5/23/22): Acute left middle cerebral artery infarct similar to that predicted on the CT perfusion. No hemorrhage. Marked narrowing of the left supraclinoid internal carotid artery with diminished antegrade flow in the left anterior and middle cerebral arteries. No significant cross-filling from the right anterior communicating artery or the left posterior communicating artery. Markedly elevated flow in the left posterior cerebral artery suggesting chronicity with collateralization. Noninvasive flow MR angiography     5/22/22:  CT PERFUSION: Large ischemic penumbra in the left middle cerebral artery territory with mismatch volume of 12.7.    CTA COW:   Diminished opacification of the left internal carotid artery, with focal severe narrowing of the left supraclinoid ICA extending to the carotid terminus, as well as diminished opacification of the left anterior cerebral artery and diminished caliber and opacification of left middle cerebral artery branches, including markedly severe effacement of the left M1 segment. No vessel cut off.    CTA NECK:   Patent, ECAs, ICAs, although diffusely decreased caliber of the left internal carotid artery extending to the skull base, cannot exclude a dissection. Atherosclerotic plaque at both carotid bifurcations with less than 50% stenosis at the ICA origins by NASCET criteria. Bilateral vertebral arteries are patent without flow limiting stenosis.    CTH 5/22/22:  Subacute left middle cerebral artery territory infarct in the frontal lobe. No evidence for hemorrhagic conversion.       THE PATIENT WAS SEEN AND EXAMINED BY ME WITH THE HOUSESTAFF AND STROKE TEAM DURING MORNING ROUNDS.   HPI:67 year old left-handed male with medical history of macular degeneration (no vision loss) and EtOH use (daily 6-7 beers/day, last drink 2 days ago) who presents as a transfer from [facility] for tertiary stroke treatment.  Family at bedside to corroborate story.  Patient flew back from [place] Saturday (5/21) morning and took a cab home.  He said he "didn't feel good".  Later in the day, he was speaking one word sentences.  This morning when he woke up, family noted that he could not complete a full sentence.  Initial CTH 5/22/2022 with subacute left MCA territory infarct in the frontal lobe.  CTA with severe focal narrowing of left supraclinoid ICA extending to the carotid terminus, as well as the diminished opacification of the left anterior cerebral artery.  CTP with large penumbra in left MCA territory with mismatch volume of 12.7cc.  Patient transferred to [facility].  Stroke score of 1 for mild non-fluent aphasia. NIHSS 1, preMRS 0.    SUBJECTIVE: No events overnight.  No new neurologic complaints.      acetaminophen     Tablet .. 650 milliGRAM(s) Oral every 6 hours PRN  aspirin 325 milliGRAM(s) Oral daily  atorvastatin 80 milliGRAM(s) Oral at bedtime  enoxaparin Injectable 40 milliGRAM(s) SubCutaneous <User Schedule>  folic acid 1 milliGRAM(s) Oral daily  latanoprost 0.005% Ophthalmic Solution 1 Drop(s) Both EYES at bedtime  lisinopril 5 milliGRAM(s) Oral daily  multivitamin 1 Tablet(s) Oral daily  polyethylene glycol 3350 17 Gram(s) Oral two times a day  senna 2 Tablet(s) Oral at bedtime  ticagrelor 60 milliGRAM(s) Oral <User Schedule>    PHYSICAL EXAM:   Vital Signs Last 24 Hrs  T(C): 37.2 (31 May 2022 05:51), Max: 37.2 (31 May 2022 05:51)  T(F): 98.9 (31 May 2022 05:51), Max: 98.9 (31 May 2022 05:51)  HR: 68 (31 May 2022 06:00) (67 - 72)  BP: 132/69 (31 May 2022 06:00) (113/89 - 164/80)  BP(mean): 81 (31 May 2022 06:00) (71 - 103)  RR: 15 (31 May 2022 06:00) (15 - 23)  SpO2: 97% (31 May 2022 06:00) (97% - 100%)    General: No acute distress  HEENT: EOM intact, BTT less on R  Abdomen: Soft, nontender, nondistended   Extremities: No edema    NEUROLOGICAL EXAM:  Mental status: Eyes open, awake, alert, oriented to self, date and hospital, speech moderately dysfluent,  follows cross commands, repetition intact  Cranial Nerves: Subtle right facial droop, no nystagmus, mild dysarthria,  tongue midline  Motor exam: Normal tone, trace Right hemiparesis  subtle drift, prox 4+/5, distally 5/5, RLE prox 4/5, distally 5/5,  mildly slow fine finger movements to right hand, LUE 5/5, LLE 5/5   Sensation: Intact to light touch   Coordination/ Gait: No dysmetria, gait deferred     LABS:                        13.5   11.65 )-----------( 319      ( 31 May 2022 06:08 )             39.8    05-31    141  |  107  |  20  ----------------------------<  116<H>  3.7   |  21<L>  |  0.92    Ca    8.8      31 May 2022 06:06      IMAGING: Reviewed by me.     MRI Head (05/27): No change in left frontal infarct compared with 5/26/2022. Improved flow in the left internal carotid artery and left anterior cerebral artery compared with 5/23/2022. Only mildly improved flow in the left MCA is related to the presence of a left M1 stenosis.    MRI HEAD 05/26/22 Post angioplasty and stenting: Again identified is an acute/subacute  left MCA territory infarct. There is no gross infarct extension or hemorrhagic conversion    MR Head/MRA Head and Neck (5/23/22): Acute left middle cerebral artery infarct similar to that predicted on the CT perfusion. No hemorrhage. Marked narrowing of the left supraclinoid internal carotid artery with diminished antegrade flow in the left anterior and middle cerebral arteries. No significant cross-filling from the right anterior communicating artery or the left posterior communicating artery. Markedly elevated flow in the left posterior cerebral artery suggesting chronicity with collateralization. Noninvasive flow MR angiography     5/22/22:  CT PERFUSION: Large ischemic penumbra in the left middle cerebral artery territory with mismatch volume of 12.7.    CTA COW:   Diminished opacification of the left internal carotid artery, with focal severe narrowing of the left supraclinoid ICA extending to the carotid terminus, as well as diminished opacification of the left anterior cerebral artery and diminished caliber and opacification of left middle cerebral artery branches, including markedly severe effacement of the left M1 segment. No vessel cut off.    CTA NECK:   Patent, ECAs, ICAs, although diffusely decreased caliber of the left internal carotid artery extending to the skull base, cannot exclude a dissection. Atherosclerotic plaque at both carotid bifurcations with less than 50% stenosis at the ICA origins by NASCET criteria. Bilateral vertebral arteries are patent without flow limiting stenosis.    CTH 5/22/22:  Subacute left middle cerebral artery territory infarct in the frontal lobe. No evidence for hemorrhagic conversion.

## 2022-05-31 NOTE — PROGRESS NOTE ADULT - ASSESSMENT
7 year old left-handed male with medical history of macular degeneration (no vision loss) and EtOH use (daily 6-7 beers/day, last drink 2 days ago) who presents as a transfer from [facility] for tertiary stroke treatment.  Family at bedside to corroborate story.  Patient flew back from [place] Saturday (5/21) morning and took a cab home.  He said he "didn't feel good".  Later in the day, he was speaking one word sentences.  This morning when he woke up, family noted that he could not complete a full sentence.  Initial CTH 5/22/2022 with subacute left MCA territory infarct in the frontal lobe.  CTA with severe focal narrowing of left supraclinoid ICA extending to the carotid terminus, as well as the diminished opacification of the left anterior cerebral artery.  CTP with large penumbra in left MCA territory with mismatch volume of 12.7cc.  Patient transferred to [facility].  Stroke score of 1 for mild non-fluent aphasia.    Impression.  Mild-moderate or moderate transcortical motor aphasia, due to left hemispheric border zone infarction, with left supraclinoid ICA and M1 stenosis.

## 2022-05-31 NOTE — DISCHARGE NOTE PROVIDER - NSDCCPCAREPLAN_GEN_ALL_CORE_FT
PRINCIPAL DISCHARGE DIAGNOSIS  Diagnosis: Ischemic stroke  Assessment and Plan of Treatment: You had difficulty with speech production, ainsley presented to [facility]. Your presentation was concerning for stroke, and underwent CT imagings & MRI brain/MR angiogram of head and neck. There was an acute infarct in left middle cerebral artery territory and you had narrowing of left supraclinoid internal carotid artery. During the hospital stay at [place], you were taken for cerebral angiogram and underwent stenting of the L supraclinoid interal carotid artery. You are now on Brillinta, Aspirin, Atorvastatin and should take these medications as prescribed. Please continue to follow with outpatient stroke clinic as instructed. Please returnt o emergency room if you have similar symptoms involving speech, one sided weakness/facial droop, or gait imbalance.       PRINCIPAL DISCHARGE DIAGNOSIS  Diagnosis: Ischemic stroke  Assessment and Plan of Treatment: You had difficulty with speech production, ainsley presented to [facility]. Your presentation was concerning for stroke, and underwent CT imagings & MRI brain/MR angiogram of head and neck. There was an acute infarct in left middle cerebral artery territory and you had narrowing of left supraclinoid internal carotid artery. During the hospital stay at [place], you were taken for cerebral angiogram and underwent stenting of the L supraclinoid interal carotid artery. You are now on Brillinta, Aspirin, Atorvastatin and should take these medications as prescribed. Please continue to follow with outpatient stroke, cardiology, and neurosurgery physicians. Please returnt o emergency room if you have similar symptoms involving speech, one sided weakness/facial droop, or gait imbalance.       PRINCIPAL DISCHARGE DIAGNOSIS  Diagnosis: Ischemic stroke  Assessment and Plan of Treatment: You had difficulty with speech production, ainsley presented to [facility]. Your presentation was concerning for stroke, and underwent CT imagings & MRI brain/MR angiogram of head and neck. There was an acute infarct in left middle cerebral artery territory and you had narrowing of left supraclinoid internal carotid artery. During the hospital stay at [place], you were taken for cerebral angiogram and underwent stenting of the L supraclinoid interal carotid artery. You are now on Brillinta, Aspirin, Atorvastatin and should take these medications as prescribed. Brillinta will be continued for 6 months. Please continue to follow with outpatient stroke, cardiology, and neurosurgery physicians. Please returnt o emergency room if you have similar symptoms involving speech, one sided weakness/facial droop, or gait imbalance.       PRINCIPAL DISCHARGE DIAGNOSIS  Diagnosis: Ischemic stroke  Assessment and Plan of Treatment: You had difficulty with speech production, ainsley presented to [facility]. Your presentation was concerning for stroke, and underwent CT imagings & MRI brain/MR angiogram of head and neck. There was an acute infarct in left middle cerebral artery territory and you had narrowing of left supraclinoid internal carotid artery. During the hospital stay at [place], you were taken for cerebral angiogram and underwent stenting of the L supraclinoid interal carotid artery. You are now on Brillinta, Aspirin, Atorvastatin and should take these medications as prescribed. Brillinta will be continued for 6 months. Please continue to follow with outpatient stroke, cardiology, and neurosurgery physicians. Please return to emergency room if you have similar symptoms involving speech, one sided weakness/facial droop, or gait imbalance.

## 2022-05-31 NOTE — PROGRESS NOTE ADULT - SUBJECTIVE AND OBJECTIVE BOX
CC: f/u for fever post CVA    Patient reports: he offers no complaints, he is afebrile and discharge being considered    REVIEW OF SYSTEMS:  All other review of systems negative (Comprehensive ROS)    Antimicrobials Day #  :off    Other Medications Reviewed    T(F): 98.8 (05-31-22 @ 08:00), Max: 98.9 (05-31-22 @ 05:51)  HR: 67 (05-31-22 @ 08:00)  BP: 129/67 (05-31-22 @ 08:00)  RR: 18 (05-31-22 @ 08:00)  SpO2: 96% (05-31-22 @ 08:00)  Wt(kg): --    PHYSICAL EXAM:  General: alert, no acute distress  Eyes:  anicteric, no conjunctival injection, no discharge  Oropharynx: no lesions or injection 	  Neck: supple, without adenopathy  Lungs: clear to auscultation  Heart: regular rate and rhythm; no murmur, rubs or gallops  Abdomen: soft, nondistended, nontender, without mass or organomegaly  Skin: no lesions  Extremities: no clubbing, cyanosis, or edema  Neurologic: alert, oriented, moves all extremities    LAB RESULTS:                        13.5   11.65 )-----------( 319      ( 31 May 2022 06:08 )             39.8     05-31    141  |  107  |  20  ----------------------------<  116<H>  3.7   |  21<L>  |  0.92    Ca    8.8      31 May 2022 06:06          MICROBIOLOGY:  RECENT CULTURES:  05-27 @ 10:13 .Blood     Babesia microti PCR  Results: NOT detected    One negative sample does not necessarily rule  out the presence of a parasitic infection.      05-26 @ 17:28 .Blood Blood     NEGATIVE for Plasmodium antigens. Microscopy is performed for  confirmation.  This test does not detect the presence of Babesia species.  If Babesiosis is suspected, please order test for Babesia PCR: Babesia  microti PCR Bld  ************************************************************  No Blood Parasites observed by giemsa stain  One negative set of blood smears does not rule out  the possibility of a parasitic infection.  A minimum of 3  specimens should be collected, at least 12-24 hours apart,  over a 36 hour time period.          RADIOLOGY REVIEWED:

## 2022-05-31 NOTE — DISCHARGE NOTE PROVIDER - CARE PROVIDER_API CALL
Libman, Richard B (MD)  Neurology; Vascular Neurology  [address]  [place] [address]  Phone: [phone]  Fax: [phone]  Follow Up Time:    Libman, Richard B (MD)  Neurology; Vascular Neurology  [address]  [place] [address]  Phone: [phone]  Fax: [phone]  Follow Up Time:     Dino Almaguer ()  Cardiology; Internal Medicine  [address]  [place] [address]  Phone: [phone]  Fax: [phone]  Follow Up Time:     Roge Medina; MS)  Unallocated  [address]  [place] [address]  Phone: [phone]  Fax: [phone]  Follow Up Time:    Dino Almaguer (DO)  Cardiology; Internal Medicine  [address]  [place] [address]  Phone: [phone]  Fax: [phone]  Follow Up Time:     Roge Medina (MD; MS)  Unallocated  [address]  [place] [address]  Phone: [phone]  Fax: [phone]  Follow Up Time:     Desi Cowan (NP; RN)  NP in [facility]  [address]  [place] [address]  Phone: [phone]  Fax: [phone]  Follow Up Time: 2 weeks

## 2022-05-31 NOTE — PROGRESS NOTE ADULT - ASSESSMENT
66 yo male s/p CVA and carotid stenting.  Fevers after trip to [facility] have resolved.  Malaria smear , PCR, and blood cultures have been negative.  His fevers have spontaneously resolved.  No additional ID w/u planned  No ID objection to discharge planning.  We will stop actively following, please call if ID issues arise

## 2022-05-31 NOTE — PROGRESS NOTE ADULT - ASSESSMENT
ASSESSMENT/PLAN  67yMale with functional deficits after L MCA CVA w gait dysfunction, aphasia  PT- ROM, Bed mob, transfers, amb w AD  OT- ADLs  SLP- Aphasia/Dysphagia eval and tx  Prec- Falls, Cardiac  DVT px - Lovenox  Skin- Turn q2h  Dispo- Acute Rehab- can tolerate 3h/d of therapies and requires daily physician visits

## 2022-05-31 NOTE — DISCHARGE NOTE PROVIDER - HOSPITAL COURSE
66 y.o. LH M with PMH of macular degeneration (no vision loss) and EtOH use (daily 6-7 beers/day, last drink 2 days ago) who presents as a transfer from [facility] for tertiary stroke treatment on 5/22. Recently returned from [facility] on 5/21, did not feel well, and was only speaking one word answers. At [place], underwent CTH showing L MCA territory infarct in L frontal lobe, CTA showed severe focal narrowing of L supraclinoid ICA extending to the carotid terminus and diminished opacity of L YVAN, and CTP showed large pneumbra in L MCA territory with mismatch volume of 12.7 cc. Initial NIHSS 1 for mild non-fluent aphasia. preMRS 0. Was having expressive aphasia 2/2 L hemispheric dysfunction, ischemic stroke in the setting of L supraclinoid ICA severe stenosis & was admitted to stroke unit.    During this hospitalization, pt underwent cerebral angiogram and MRI brain & MRA H/N on 5/23. On 5/26, pt underwent selective cerebral angiogram and L supraclinoid ICA stent. Result as noted below:    Selective cerebral angiography 5/23:  severe L ICA stenosis    MRI brain w/o & MRA H/N w/wo 5/23:  < from: MR Head No Cont (05.23.22 @ 10:35) >  IMPRESSION: Acute left middle cerebral artery infarct similar to that   predicted on the CT perfusion. No hemorrhage. Marked narrowing of the   left supraclinoid internal carotid artery with diminished antegrade flow   in the left anterior and middle cerebral arteries. No significant   cross-filling from the right anterior communicating artery or the left   posterior communicating artery.    Cerebral angiogram with stent 5/26:  Demonstrated worsening left supraclinoid ICA stenosis from previous angiogram, s/p stent, residual left M1 stenosis that is not flow limiting. ( Official note to follow).    Required NSCU stay after stent placed, monitored under CIWA. Eventually was started on Brillinta 60 mg twice daily and ASA 81 mg qd. 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 Pt now medically and neurologically stable to be discharged to acute rehab. Patient should follow up with Stroke NP, Desi Cowan, in clinic at [address] [place] [address].    66 y.o. LH M with PMH of macular degeneration (no vision loss) and EtOH use (daily 6-7 beers/day, last drink 2 days ago) who presents as a transfer from [facility] for tertiary stroke treatment on 5/22. Recently returned from [facility] on 5/21, did not feel well, and was only speaking one word answers. At [place], underwent CTH showing L MCA territory infarct in L frontal lobe, CTA showed severe focal narrowing of L supraclinoid ICA extending to the carotid terminus and diminished opacity of L YVAN, and CTP showed large pneumbra in L MCA territory with mismatch volume of 12.7 cc. Initial NIHSS 1 for mild non-fluent aphasia. preMRS 0. Was having expressive aphasia 2/2 L hemispheric dysfunction, ischemic stroke in the setting of L supraclinoid ICA severe stenosis & was admitted to stroke unit.    During this hospitalization, pt underwent cerebral angiogram and MRI brain & MRA H/N on 5/23. On 5/26, pt underwent selective cerebral angiogram and L supraclinoid ICA stent. Result as noted below:    Selective cerebral angiography 5/23:  severe L ICA stenosis    MRI brain w/o & MRA H/N w/wo 5/23:  < from: MR Head No Cont (05.23.22 @ 10:35) >  IMPRESSION: Acute left middle cerebral artery infarct similar to that   predicted on the CT perfusion. No hemorrhage. Marked narrowing of the   left supraclinoid internal carotid artery with diminished antegrade flow   in the left anterior and middle cerebral arteries. No significant   cross-filling from the right anterior communicating artery or the left   posterior communicating artery.    Cerebral angiogram with stent 5/26:  Demonstrated worsening left supraclinoid ICA stenosis from previous angiogram, s/p stent, residual left M1 stenosis that is not flow limiting.    Also underwent TTE w/ bubble which showed EF of 65%, Normal left ventricular systolic function. No segmental wall motion abnormalities, continue with cardiac monitoring    Required NSCU stay after stent placed, monitored under CIWA. Was febrile, ID was consulted and infectious work up with UA and CXR negative. Was monitored off antibiotics and fevers resolved. Eventually was started on Brillinta 60 mg twice daily, ASA 81 mg qd, and Atorvastatin 80 mg qhs. Seen by cardiologist for close monitoring and establishment of care. 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 Pt now medically and neurologically stable to be discharged to acute rehab. Patient should follow up with Stroke NP, Desi Cowan, in clinic at [address] [place] [address] as well as cardiologist and neurosurgery.   66 y.o. LH M with PMH of macular degeneration (no vision loss) and EtOH use (daily 6-7 beers/day, last drink 2 days ago) who presents as a transfer from [facility] for tertiary stroke treatment on 5/22. Recently returned from [facility] on 5/21, did not feel well, and was only speaking one word answers. At [place], underwent CTH showing L MCA territory infarct in L frontal lobe, CTA showed severe focal narrowing of L supraclinoid ICA extending to the carotid terminus and diminished opacity of L YVAN, and CTP showed large pneumbra in L MCA territory with mismatch volume of 12.7 cc. Initial NIHSS 1 for mild non-fluent aphasia. preMRS 0. Was having expressive aphasia 2/2 L hemispheric dysfunction, ischemic stroke in the setting of L supraclinoid ICA severe stenosis & was admitted to stroke unit.    During this hospitalization, pt underwent cerebral angiogram and MRI brain & MRA H/N on 5/23. On 5/26, pt underwent selective cerebral angiogram and L supraclinoid ICA stent. Result as noted below:    Selective cerebral angiography 5/23:  severe L ICA stenosis    MRI brain w/o & MRA H/N w/wo 5/23:  < from: MR Head No Cont (05.23.22 @ 10:35) >  IMPRESSION: Acute left middle cerebral artery infarct similar to that   predicted on the CT perfusion. No hemorrhage. Marked narrowing of the   left supraclinoid internal carotid artery with diminished antegrade flow   in the left anterior and middle cerebral arteries. No significant   cross-filling from the right anterior communicating artery or the left   posterior communicating artery.    Cerebral angiogram with stent 5/26:  Demonstrated worsening left supraclinoid ICA stenosis from previous angiogram, s/p stent, residual left M1 stenosis that is not flow limiting.    Also underwent TTE w/ bubble which showed EF of 65%, Normal left ventricular systolic function. No segmental wall motion abnormalities, continue with cardiac monitoring    Required NSCU stay after stent placed, monitored under CIWA. Was febrile, ID was consulted and infectious work up with UA and CXR negative. Was monitored off antibiotics and fevers resolved. Eventually was started on Brillinta 60 mg twice daily, ASA 81 mg qd, and Atorvastatin 80 mg qhs. Brillinta will be continued for 6 months. Seen by cardiologist for close monitoring and establishment of care. 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 Pt now medically and neurologically stable to be discharged to acute rehab. Patient should follow up with Stroke NP, Desi Cowan, in clinic at [address] [place] [address] as well as cardiologist and neurosurgery.   66 y.o. LH M with PMH of macular degeneration (no vision loss) and EtOH use (daily 6-7 beers/day, last drink 2 days ago) who presents as a transfer from [facility] for tertiary stroke treatment on 5/22. Recently returned from [facility] on 5/21, did not feel well, and was only speaking one word answers. At [place], underwent CTH showing L MCA territory infarct in L frontal lobe, CTA showed severe focal narrowing of L supraclinoid ICA extending to the carotid terminus and diminished opacity of L YVAN, and CTP showed large pneumbra in L MCA territory with mismatch volume of 12.7 cc. Initial NIHSS 1 for mild non-fluent aphasia. preMRS 0. Was having expressive aphasia 2/2 L hemispheric dysfunction, ischemic stroke in the setting of L supraclinoid ICA severe stenosis & was admitted to stroke unit.    During this hospitalization, pt underwent cerebral angiogram and MRI brain & MRA H/N on 5/23. On 5/26, pt underwent selective cerebral angiogram and L supraclinoid ICA stent. Result as noted below:      MRI Head (05/27): No change in left frontal infarct compared with 5/26/2022. Improved flow in the left internal carotid artery and left anterior cerebral artery compared with 5/23/2022. Only mildly improved flow in the left MCA is related to the presence of a left M1 stenosis.    MRI HEAD 05/26/22 Post angioplasty and stenting: Again identified is an acute/subacute  left MCA territory infarct. There is no gross infarct extension or hemorrhagic conversion    MR Head/MRA Head and Neck (5/23/22): Acute left middle cerebral artery infarct similar to that predicted on the CT perfusion. No hemorrhage. Marked narrowing of the left supraclinoid internal carotid artery with diminished antegrade flow in the left anterior and middle cerebral arteries. No significant cross-filling from the right anterior communicating artery or the left posterior communicating artery. Markedly elevated flow in the left posterior cerebral artery suggesting chronicity with collateralization. Noninvasive flow MR angiography     5/22/22:  CT PERFUSION: Large ischemic penumbra in the left middle cerebral artery territory with mismatch volume of 12.7.    CTA COW:   Diminished opacification of the left internal carotid artery, with focal severe narrowing of the left supraclinoid ICA extending to the carotid terminus, as well as diminished opacification of the left anterior cerebral artery and diminished caliber and opacification of left middle cerebral artery branches, including markedly severe effacement of the left M1 segment. No vessel cut off.    CTA NECK:   Patent, ECAs, ICAs, although diffusely decreased caliber of the left internal carotid artery extending to the skull base, cannot exclude a dissection. Atherosclerotic plaque at both carotid bifurcations with less than 50% stenosis at the ICA origins by NASCET criteria. Bilateral vertebral arteries are patent without flow limiting stenosis.    CTH 5/22/22:  Subacute left middle cerebral artery territory infarct in the frontal lobe. No evidence for hemorrhagic conversion.    Impression.  Mild-moderate or moderate transcortical motor aphasia, due to left hemispheric border zone infarction, with left supraclinoid ICA and M1 stenosis.  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   Cerebral angiogram with stent 5/26:  Demonstrated worsening left supraclinoid ICA stenosis from previous angiogram, s/p stent, residual left M1 stenosis that is not flow limiting.    ANTITHROMBOTIC THERAPY:   on Brilinta, will continue Aspirin for left supraclinoid ICA stent.  (05/26) P2Y12: 92 (05/28)    TTE w/ bubble which showed EF of 65%, Normal left ventricular systolic function. No segmental wall motion abnormalities.    Evaluated by PT/OT and was recommended AR. Patient stable for discharge.        66 y.o. LH M with PMH of macular degeneration (no vision loss) and EtOH use (daily 6-7 beers/day, last drink 2 days ago) who presents as a transfer from [facility] for tertiary stroke treatment on 5/22. Recently returned from [facility] on 5/21, did not feel well, and was only speaking one word answers. At [place], underwent CTH showing L MCA territory infarct in L frontal lobe, CTA showed severe focal narrowing of L supraclinoid ICA extending to the carotid terminus and diminished opacity of L YVAN, and CTP showed large pneumbra in L MCA territory with mismatch volume of 12.7 cc. Initial NIHSS 1 for mild non-fluent aphasia. preMRS 0. Was having expressive aphasia 2/2 L hemispheric dysfunction, ischemic stroke in the setting of L supraclinoid ICA severe stenosis & was admitted to stroke unit.    During this hospitalization, pt underwent cerebral angiogram and MRI brain & MRA H/N on 5/23. On 5/26, pt underwent selective cerebral angiogram and L supraclinoid ICA stent. Result as noted below:      MRI Head (05/27): No change in left frontal infarct compared with 5/26/2022. Improved flow in the left internal carotid artery and left anterior cerebral artery compared with 5/23/2022. Only mildly improved flow in the left MCA is related to the presence of a left M1 stenosis.    MRI HEAD 05/26/22 Post angioplasty and stenting: Again identified is an acute/subacute  left MCA territory infarct. There is no gross infarct extension or hemorrhagic conversion    MR Head/MRA Head and Neck (5/23/22): Acute left middle cerebral artery infarct similar to that predicted on the CT perfusion. No hemorrhage. Marked narrowing of the left supraclinoid internal carotid artery with diminished antegrade flow in the left anterior and middle cerebral arteries. No significant cross-filling from the right anterior communicating artery or the left posterior communicating artery. Markedly elevated flow in the left posterior cerebral artery suggesting chronicity with collateralization. Noninvasive flow MR angiography     5/22/22:  CT PERFUSION: Large ischemic penumbra in the left middle cerebral artery territory with mismatch volume of 12.7.    CTA COW:   Diminished opacification of the left internal carotid artery, with focal severe narrowing of the left supraclinoid ICA extending to the carotid terminus, as well as diminished opacification of the left anterior cerebral artery and diminished caliber and opacification of left middle cerebral artery branches, including markedly severe effacement of the left M1 segment. No vessel cut off.    CTA NECK:   Patent, ECAs, ICAs, although diffusely decreased caliber of the left internal carotid artery extending to the skull base, cannot exclude a dissection. Atherosclerotic plaque at both carotid bifurcations with less than 50% stenosis at the ICA origins by NASCET criteria. Bilateral vertebral arteries are patent without flow limiting stenosis.    CTH 5/22/22:  Subacute left middle cerebral artery territory infarct in the frontal lobe. No evidence for hemorrhagic conversion.    Impression.  Mild-moderate or moderate transcortical motor aphasia, due to left hemispheric border zone infarction, with left supraclinoid ICA and M1 stenosis.  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   Cerebral angiogram with stent 5/26:  Demonstrated worsening left supraclinoid ICA stenosis from previous angiogram, s/p stent, residual left M1 stenosis that is not flow limiting.    ANTITHROMBOTIC THERAPY:   Brilinta 60 mg BID and ASA 81 mg for left supraclinoid ICA stent.  P2Y12: 92 (05/28)    TTE w/ bubble which showed EF of 65%, Normal left ventricular systolic function. No segmental wall motion abnormalities.    Evaluated by PT/OT and was recommended AR. Patient stable for discharge.        66 y.o. LH M with PMH of macular degeneration (no vision loss) and EtOH use (daily 6-7 beers/day, last drink 2 days ago) who presents as a transfer from [facility] for tertiary stroke treatment on 5/22. Recently returned from [facility] on 5/21, did not feel well, and was only speaking one word answers. At [place], underwent CTH showing L MCA territory infarct in L frontal lobe, CTA showed severe focal narrowing of L supraclinoid ICA extending to the carotid terminus and diminished opacity of L YVAN, and CTP showed large pneumbra in L MCA territory with mismatch volume of 12.7 cc. Initial NIHSS 1 for mild non-fluent aphasia. preMRS 0. Was having expressive aphasia 2/2 L hemispheric dysfunction, ischemic stroke in the setting of L supraclinoid ICA severe stenosis & was admitted to stroke unit.    During this hospitalization, pt underwent cerebral angiogram and MRI brain & MRA H/N on 5/23. On 5/26, pt underwent selective cerebral angiogram and L supraclinoid ICA stent. Result as noted below:      MRI Head (05/27): No change in left frontal infarct compared with 5/26/2022. Improved flow in the left internal carotid artery and left anterior cerebral artery compared with 5/23/2022. Only mildly improved flow in the left MCA is related to the presence of a left M1 stenosis.    MRI HEAD 05/26/22 Post angioplasty and stenting: Again identified is an acute/subacute  left MCA territory infarct. There is no gross infarct extension or hemorrhagic conversion    MR Head/MRA Head and Neck (5/23/22): Acute left middle cerebral artery infarct similar to that predicted on the CT perfusion. No hemorrhage. Marked narrowing of the left supraclinoid internal carotid artery with diminished antegrade flow in the left anterior and middle cerebral arteries. No significant cross-filling from the right anterior communicating artery or the left posterior communicating artery. Markedly elevated flow in the left posterior cerebral artery suggesting chronicity with collateralization. Noninvasive flow MR angiography     5/22/22:  CT PERFUSION: Large ischemic penumbra in the left middle cerebral artery territory with mismatch volume of 12.7.    CTA COW:   Diminished opacification of the left internal carotid artery, with focal severe narrowing of the left supraclinoid ICA extending to the carotid terminus, as well as diminished opacification of the left anterior cerebral artery and diminished caliber and opacification of left middle cerebral artery branches, including markedly severe effacement of the left M1 segment. No vessel cut off.    CTA NECK:   Patent, ECAs, ICAs, although diffusely decreased caliber of the left internal carotid artery extending to the skull base, cannot exclude a dissection. Atherosclerotic plaque at both carotid bifurcations with less than 50% stenosis at the ICA origins by NASCET criteria. Bilateral vertebral arteries are patent without flow limiting stenosis.    CTH 5/22/22:  Subacute left middle cerebral artery territory infarct in the frontal lobe. No evidence for hemorrhagic conversion.    Impression.  Mild-moderate or moderate transcortical motor aphasia, due to left hemispheric border zone infarction, with left supraclinoid ICA and M1 stenosis.  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   Cerebral angiogram with stent 5/26:  Demonstrated worsening left supraclinoid ICA stenosis from previous angiogram, s/p stent, residual left M1 stenosis that is not flow limiting.    ANTITHROMBOTIC THERAPY:   Brilinta 60 mg BID and ASA 81 mg for left supraclinoid ICA stent.  P2Y12: 92 (05/28)    TTE w/ bubble which showed EF of 65%, Normal left ventricular systolic function. No segmental wall motion abnormalities.    Evaluated by PT/OT and was recommended AR initially, re-evaluated and deemed safe to go home with services. Patient stable for discharge.

## 2022-05-31 NOTE — DISCHARGE NOTE PROVIDER - NSFOLLOWUPCLINICS_GEN_ALL_ED_FT
[facility]  [facility]  [address] [place] [address]  Phone: [phone]  Fax: [phone]

## 2022-05-31 NOTE — PROGRESS NOTE ADULT - ASSESSMENT
67 year old left-handed male with medical history of macular degeneration (no vision loss) and EtOH use (daily 6-7 beers/day, last drink 2 days ago) who presents as a transfer from [facility] for tertiary stroke treatment.  Family at bedside to corroborate story.  Patient flew back from [place] Saturday (5/21) morning and took a cab home.  He said he "didn't feel good".  Later in the day, he was speaking one word sentences.  This morning when he woke up, family noted that he could not complete a full sentence.  Initial CTH 5/22/2022 with subacute left MCA territory infarct in the frontal lobe.  CTA with severe focal narrowing of left supraclinoid ICA extending to the carotid terminus, as well as the diminished opacification of the left anterior cerebral artery.  CTP with large penumbra in left MCA territory with mismatch volume of 12.7cc.  Patient transferred to [facility].  Stroke score of 1 for mild non-fluent aphasia.    Impression.  Mild-moderate or moderate transcortical motor aphasia, due to left hemispheric border zone infarction, with left supraclinoid ICA and M1 stenosis.  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     NEURO: Neuro exam worse morning of 05/26 with right hemiparesis now s/p  left carotid stent for symptomatic severe carotid stenosis by Dr. Medina, exam now improved and overall stable.  : started on high dose statin,   MRI Brain w/o, MRA Head and Neck w/o results as noted above. Physical therapy/OT recommending acute rehab.     ANTITHROMBOTIC THERAPY:   on Brilinta, will continue Aspirin for left supraclinoid ICA stent.  (05/26) P2Y12: 92 (05/28)     PULMONARY: CXR (05/25) clear, Protecting airway, saturating well     CARDIOVASCULAR: TTE shows EF of 65%, Normal left ventricular systolic function. No segmental wall motion abnormalities, continue with cardiac monitoring. close cardiology follow up                           SBP goal: 110-140    GASTROINTESTINAL:  dysphagia screen passed, tolerating diet      Diet: Regular     RENAL: BUN/Cr without acute change, good urine output , maintain adequate hydration      Na Goal: Greater than 135     Newman: No    HEMATOLOGY: H/H anemia- mild improvement, will continue to monitor, Platelets 319, no signs of bleeding. LE doppler (05/24): No evidence of DVT in either lower extremity.  DVT ppx: LMWH      ID: afebrile, Tmax 38 (05/25), leukocytosis resolved, infectious work up negative, prior fever likely reactive to the stroke, will continue to monitor off antibiotics. MIcroti PCR: Negative. ID consult appreciated, monitor off abx- follow final blood cx    OTHER: All questions and concerns addressed with patient bedside. Alcohol abuse counseling provided, will continue [facility] protocol     DISPOSITION: Acute Rehab as per PT/OT eval once stable and workup is complete.    CORE MEASURES:        Admission NIHSS: 1     TPA:  NO      LDL/HDL: 140/48     Depression Screen: N/A unable to participate with questionnaire     Statin Therapy: y     Dysphagia Screen:  PASS      Smoking NO      Afib NO     Stroke Education  YES    Obtain screening lower extremity venous ultrasound in patients who meet 1 or more of the following criteria as patient is high risk for DVT/PE on admission:   [] History of DVT/PE  []Hypercoagulable states (Factor V Leiden, Cancer, OCP, etc. )  [x]Prolonged immobility (hemiplegia/hemiparesis/post operative or any other extended immobilization)- flight   [] Transferred from outside facility (Rehab or Long term care)  [] Age </= to 50.

## 2022-05-31 NOTE — DISCHARGE NOTE PROVIDER - PROVIDER TOKENS
PROVIDER:[TOKEN:[[id number]]] PROVIDER:[TOKEN:[[id number]]],PROVIDER:[TOKEN:[[id number]]],PROVIDER:[TOKEN:[[id number]]] PROVIDER:[TOKEN:[[id number]]],PROVIDER:[TOKEN:[[id number]]],PROVIDER:[TOKEN:[[id number]],FOLLOWUP:[2 weeks]]

## 2022-05-31 NOTE — DISCHARGE NOTE PROVIDER - CARE PROVIDERS DIRECT ADDRESSES
,[email or web address],DirectAddress_Unknown,DirectAddress_Unknown ,DirectAddress_Unknown,DirectAddress_Unknown,DirectAddress_Unknown

## 2022-05-31 NOTE — DISCHARGE NOTE PROVIDER - NSDCCPTREATMENT_GEN_ALL_CORE_FT
PRINCIPAL PROCEDURE  Procedure: MRI of brain  Findings and Treatment: IMPRESSION: Acute left middle cerebral artery infarct similar to that   predicted on the CT perfusion. No hemorrhage. Marked narrowing of the left supraclinoid internal carotid artery with diminished antegrade flow in the left anterior and middle cerebral arteries. No significant   cross-filling from the right anterior communicating artery or the left   posterior communicating artery.  Noninvasive flow MR angiography as above.

## 2022-05-31 NOTE — PROGRESS NOTE ADULT - SUBJECTIVE AND OBJECTIVE BOX
Subjective: Patient seen and examined. No new events except as noted.   remains in Stroke unit   no cp or sob     REVIEW OF SYSTEMS:    CONSTITUTIONAL: +weakness, fevers or chills  EYES/ENT: No visual changes;  No vertigo or throat pain   NECK: No pain or stiffness  RESPIRATORY: No cough, wheezing, hemoptysis; No shortness of breath  CARDIOVASCULAR: No chest pain or palpitations  GASTROINTESTINAL: No abdominal or epigastric pain. No nausea, vomiting, or hematemesis; No diarrhea or constipation. No melena or hematochezia.  GENITOURINARY: No dysuria, frequency or hematuria  NEUROLOGICAL: No numbness or weakness  SKIN: No itching, burning, rashes, or lesions   All other review of systems is negative unless indicated above.    MEDICATIONS:  MEDICATIONS  (STANDING):  aspirin 325 milliGRAM(s) Oral daily  atorvastatin 80 milliGRAM(s) Oral at bedtime  enoxaparin Injectable 40 milliGRAM(s) SubCutaneous <User Schedule>  folic acid 1 milliGRAM(s) Oral daily  latanoprost 0.005% Ophthalmic Solution 1 Drop(s) Both EYES at bedtime  lisinopril 5 milliGRAM(s) Oral daily  multivitamin 1 Tablet(s) Oral daily  polyethylene glycol 3350 17 Gram(s) Oral two times a day  senna 2 Tablet(s) Oral at bedtime  ticagrelor 60 milliGRAM(s) Oral <User Schedule>      PHYSICAL EXAM:  T(C): 37.1 (05-31-22 @ 08:00), Max: 37.2 (05-31-22 @ 05:51)  HR: 67 (05-31-22 @ 08:00) (67 - 72)  BP: 129/67 (05-31-22 @ 08:00) (113/89 - 164/80)  RR: 18 (05-31-22 @ 08:00) (15 - 23)  SpO2: 96% (05-31-22 @ 08:00) (96% - 100%)  Wt(kg): --  I&O's Summary    30 May 2022 07:01  -  31 May 2022 07:00  --------------------------------------------------------  IN: 0 mL / OUT: 300 mL / NET: -300 mL            Appearance: NAD	  HEENT:  Dry  oral mucosa, PERRL, EOMI	  Lymphatic: No lymphadenopathy  Cardiovascular: Normal S1 S2, No JVD, No murmurs, No edema  Respiratory: decreased bs   Psychiatry: A & O x 3, Mood & affect appropriate  Gastrointestinal:  Soft, Non-tender, + BS	  Skin: No rashes, No ecchymoses, No cyanosis	  Extremities: Normal range of motion, No clubbing, cyanosis or edema  Vascular: Peripheral pulses palpable 2+ bilaterally  NEUROLOGICAL EXAM:  Mental status: Eyes open, awake, alert, oriented to self, date and hospital, speech moderately dysfluent,  follows cross commands, repetition intact  Cranial Nerves: Subtle right facial droop, no nystagmus, mild dysarthria,  tongue midline  Motor exam: Normal tone, Right hemiparesis RUE subtle drift, prox 4/5, distally 5/5, RLE prox 4/5, distally 5/5,  mildly slow fine finger movements to right hand, LUE 5/5, LLE 5/5   Sensation: Intact to light touch   Coordination/ Gait: No dysmetria, gait deferred         LABS:    CARDIAC MARKERS:                                13.5   11.65 )-----------( 319      ( 31 May 2022 06:08 )             39.8     05-31    141  |  107  |  20  ----------------------------<  116<H>  3.7   |  21<L>  |  0.92    Ca    8.8      31 May 2022 06:06      proBNP:   Lipid Profile:   HgA1c:   TSH:             TELEMETRY: 	SR    ECG:  	  RADIOLOGY:   DIAGNOSTIC TESTING:  [ ] Echocardiogram:  [ ]  Catheterization:  [ ] Stress Test:    OTHER:

## 2022-06-01 LAB — MAGNESIUM SERPL-MCNC: 2.1 MG/DL — SIGNIFICANT CHANGE UP (ref 1.6–2.6)

## 2022-06-01 PROCEDURE — 99233 SBSQ HOSP IP/OBS HIGH 50: CPT

## 2022-06-01 RX ORDER — ASPIRIN/CALCIUM CARB/MAGNESIUM 324 MG
1 TABLET ORAL
Qty: 0 | Refills: 0 | DISCHARGE
Start: 2022-06-01

## 2022-06-01 RX ORDER — SENNA PLUS 8.6 MG/1
2 TABLET ORAL
Qty: 0 | Refills: 0 | DISCHARGE
Start: 2022-06-01

## 2022-06-01 RX ORDER — ENOXAPARIN SODIUM 100 MG/ML
40 INJECTION SUBCUTANEOUS
Qty: 0 | Refills: 0 | DISCHARGE
Start: 2022-06-01

## 2022-06-01 RX ORDER — ASPIRIN/CALCIUM CARB/MAGNESIUM 324 MG
81 TABLET ORAL DAILY
Refills: 0 | Status: DISCONTINUED | OUTPATIENT
Start: 2022-06-01 | End: 2022-06-02

## 2022-06-01 RX ORDER — TICAGRELOR 90 MG/1
1 TABLET ORAL
Qty: 0 | Refills: 0 | DISCHARGE

## 2022-06-01 RX ORDER — ATORVASTATIN CALCIUM 80 MG/1
1 TABLET, FILM COATED ORAL
Qty: 0 | Refills: 0 | DISCHARGE
Start: 2022-06-01

## 2022-06-01 RX ORDER — LISINOPRIL 2.5 MG/1
1 TABLET ORAL
Qty: 0 | Refills: 0 | DISCHARGE
Start: 2022-06-01

## 2022-06-01 RX ORDER — POLYETHYLENE GLYCOL 3350 17 G/17G
17 POWDER, FOR SOLUTION ORAL
Qty: 0 | Refills: 0 | DISCHARGE
Start: 2022-06-01

## 2022-06-01 RX ORDER — FOLIC ACID 0.8 MG
1 TABLET ORAL
Qty: 0 | Refills: 0 | DISCHARGE
Start: 2022-06-01

## 2022-06-01 RX ADMIN — LATANOPROST 1 DROP(S): 0.05 SOLUTION/ DROPS OPHTHALMIC; TOPICAL at 21:17

## 2022-06-01 RX ADMIN — LISINOPRIL 5 MILLIGRAM(S): 2.5 TABLET ORAL at 05:53

## 2022-06-01 RX ADMIN — Medication 81 MILLIGRAM(S): at 12:14

## 2022-06-01 RX ADMIN — TICAGRELOR 60 MILLIGRAM(S): 90 TABLET ORAL at 00:06

## 2022-06-01 RX ADMIN — ATORVASTATIN CALCIUM 80 MILLIGRAM(S): 80 TABLET, FILM COATED ORAL at 21:16

## 2022-06-01 RX ADMIN — ENOXAPARIN SODIUM 40 MILLIGRAM(S): 100 INJECTION SUBCUTANEOUS at 17:18

## 2022-06-01 NOTE — PROGRESS NOTE ADULT - ASSESSMENT
67 year old left-handed male with medical history of macular degeneration (no vision loss) and EtOH use (daily 6-7 beers/day, last drink 2 days ago) who presents as a transfer from [facility] for tertiary stroke treatment.  Family at bedside to corroborate story.  Patient flew back from [place] Saturday (5/21) morning and took a cab home.  He said he "didn't feel good".  Later in the day, he was speaking one word sentences.  This morning when he woke up, family noted that he could not complete a full sentence.  Initial CTH 5/22/2022 with subacute left MCA territory infarct in the frontal lobe.  CTA with severe focal narrowing of left supraclinoid ICA extending to the carotid terminus, as well as the diminished opacification of the left anterior cerebral artery.  CTP with large penumbra in left MCA territory with mismatch volume of 12.7cc.  Patient transferred to [facility].  Stroke score of 1 for mild non-fluent aphasia.    Impression.  Mild-moderate or moderate transcortical motor aphasia, due to left hemispheric border zone infarction, with left supraclinoid ICA and M1 stenosis.  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     NEURO: neurologically now without acute change,  exam worse morning of 05/26 with right hemiparesis now s/p  left carotid stent for symptomatic severe carotid stenosis by Dr. Medina, exam now improved and overall stable.  : started on high dose statin,   MRI Brain w/o, MRA Head and Neck w/o results as noted above. Physical therapy/OT recommending acute rehab.     ANTITHROMBOTIC THERAPY:   on Brilinta, will continue Aspirin for left supraclinoid ICA stent.  (05/26) P2Y12: 92 (05/28)     PULMONARY: CXR (05/25) clear, Protecting airway, saturating well     CARDIOVASCULAR: TTE shows EF of 65%, Normal left ventricular systolic function. No segmental wall motion abnormalities, continue with cardiac monitoring. close cardiology follow up                           SBP goal: 110-140    GASTROINTESTINAL:  dysphagia screen passed, tolerating diet      Diet: Regular     RENAL: BUN/Cr without acute change, good urine output , maintain adequate hydration      Na Goal: Greater than 135     Newman: No    HEMATOLOGY: H/H anemia- mild improvement, will continue to monitor, Platelets 319, no signs of bleeding. LE doppler (05/24): No evidence of DVT in either lower extremity.  DVT ppx: LMWH      ID: afebrile, Tmax 38 (05/25), leukocytosis overall improving, infectious work up negative, prior fever likely reactive to the stroke, will continue to monitor off antibiotics. MIcroti PCR: Negative. ID consult appreciated, Fevers after trip to [facility] have resolved. Malaria smear , PCR, and blood cultures have been negative. His fevers have spontaneously resolved.  No additional ID w/u planned No ID objection to discharge planning.    OTHER: All questions and concerns addressed with patient bedside. Alcohol abuse counseling provided, will continue [facility] protocol     DISPOSITION: Acute Rehab as per PT/OT eval once stable and workup is complete.    CORE MEASURES:        Admission NIHSS: 1     TPA:  NO      LDL/HDL: 140/48     Depression Screen: N/A unable to participate with questionnaire     Statin Therapy: y     Dysphagia Screen:  PASS      Smoking NO      Afib NO     Stroke Education  YES    Obtain screening lower extremity venous ultrasound in patients who meet 1 or more of the following criteria as patient is high risk for DVT/PE on admission:   [] History of DVT/PE  []Hypercoagulable states (Factor V Leiden, Cancer, OCP, etc. )  [x]Prolonged immobility (hemiplegia/hemiparesis/post operative or any other extended immobilization)- flight   [] Transferred from outside facility (Rehab or Long term care)  [] Age </= to 50.   67 year old left-handed male with medical history of macular degeneration (no vision loss) and EtOH use (daily 6-7 beers/day, last drink 2 days ago) who presents as a transfer from [facility] for tertiary stroke treatment.  Family at bedside to corroborate story.  Patient flew back from [place] Saturday (5/21) morning and took a cab home.  He said he "didn't feel good".  Later in the day, he was speaking one word sentences.  This morning when he woke up, family noted that he could not complete a full sentence.  Initial CTH 5/22/2022 with subacute left MCA territory infarct in the frontal lobe.  CTA with severe focal narrowing of left supraclinoid ICA extending to the carotid terminus, as well as the diminished opacification of the left anterior cerebral artery.  CTP with large penumbra in left MCA territory with mismatch volume of 12.7cc.  Patient transferred to [facility].  Stroke score of 1 for mild non-fluent aphasia.    Impression.  Mild-moderate or moderate transcortical motor aphasia, due to left hemispheric border zone infarction, with left supraclinoid ICA and M1 stenosis.  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     NEURO: neurologically now without acute change,  exam worse morning of 05/26 with right hemiparesis now s/p  left carotid stent for symptomatic severe carotid stenosis by Dr. Medina, exam now improved and overall without acute change.  : started on high dose statin,   MRI Brain w/o, MRA Head and Neck w/o results as noted above. Physical therapy/OT recommending acute rehab.     ANTITHROMBOTIC THERAPY:   on Brilinta, will continue Aspirin for left supraclinoid ICA stent.  (05/26) P2Y12: 92 (05/28)     PULMONARY: CXR (05/25) clear, Protecting airway, saturating well     CARDIOVASCULAR: TTE shows EF of 65%, Normal left ventricular systolic function. No segmental wall motion abnormalities, continue with cardiac monitoring. close cardiology follow up                           SBP goal: 110-140    GASTROINTESTINAL:  dysphagia screen passed, tolerating diet      Diet: Regular     RENAL: BUN/Cr without acute change, good urine output , maintain adequate hydration      Na Goal: Greater than 135     Newman: No    HEMATOLOGY: H/H anemia- improved, will continue to monitor, Platelets 319, no signs of bleeding. LE doppler (05/24): No evidence of DVT in either lower extremity.  DVT ppx: LMWH      ID: afebrile, Tmax 38 (05/25), leukocytosis overall improving, infectious work up negative, prior fever likely reactive to the stroke, will continue to monitor off antibiotics. MIcroti PCR: Negative. ID consult appreciated, Fevers after trip to [facility] have resolved. Malaria smear , PCR, and blood cultures have been negative. His fevers have spontaneously resolved.  No additional ID w/u planned No ID objection to discharge planning.    OTHER: All questions and concerns addressed with patient bedside. Alcohol abuse counseling provided, will continue [facility] protocol     DISPOSITION: Acute Rehab as per PT/OT eval once stable and workup is complete.    CORE MEASURES:        Admission NIHSS: 1     TPA:  NO      LDL/HDL: 140/48     Depression Screen: N/A unable to participate with questionnaire     Statin Therapy: y     Dysphagia Screen:  PASS      Smoking NO      Afib NO     Stroke Education  YES    Obtain screening lower extremity venous ultrasound in patients who meet 1 or more of the following criteria as patient is high risk for DVT/PE on admission:   [] History of DVT/PE  []Hypercoagulable states (Factor V Leiden, Cancer, OCP, etc. )  [x]Prolonged immobility (hemiplegia/hemiparesis/post operative or any other extended immobilization)- flight   [] Transferred from outside facility (Rehab or Long term care)  [] Age </= to 50.

## 2022-06-01 NOTE — PROGRESS NOTE ADULT - SUBJECTIVE AND OBJECTIVE BOX
Subjective: Patient seen and examined. No new events except as noted.     REVIEW OF SYSTEMS:    CONSTITUTIONAL: + weakness, fevers or chills  EYES/ENT: No visual changes;  No vertigo or throat pain   NECK: No pain or stiffness  RESPIRATORY: No cough, wheezing, hemoptysis; No shortness of breath  CARDIOVASCULAR: No chest pain or palpitations  GASTROINTESTINAL: No abdominal or epigastric pain. No nausea, vomiting, or hematemesis; No diarrhea or constipation. No melena or hematochezia.  GENITOURINARY: No dysuria, frequency or hematuria  NEUROLOGICAL: No numbness or weakness  SKIN: No itching, burning, rashes, or lesions   All other review of systems is negative unless indicated above.    MEDICATIONS:  MEDICATIONS  (STANDING):  aspirin 325 milliGRAM(s) Oral daily  atorvastatin 80 milliGRAM(s) Oral at bedtime  enoxaparin Injectable 40 milliGRAM(s) SubCutaneous <User Schedule>  folic acid 1 milliGRAM(s) Oral daily  latanoprost 0.005% Ophthalmic Solution 1 Drop(s) Both EYES at bedtime  lisinopril 5 milliGRAM(s) Oral daily  multivitamin 1 Tablet(s) Oral daily  polyethylene glycol 3350 17 Gram(s) Oral two times a day  senna 2 Tablet(s) Oral at bedtime  ticagrelor 60 milliGRAM(s) Oral <User Schedule>    PHYSICAL EXAM:  T(C): 36.9 (06-01-22 @ 05:33), Max: 37.2 (05-31-22 @ 14:00)  HR: 79 (06-01-22 @ 05:33) (65 - 79)  BP: 138/75 (06-01-22 @ 05:33) (118/62 - 155/65)  RR: 18 (06-01-22 @ 05:33) (16 - 19)  SpO2: 95% (06-01-22 @ 05:33) (95% - 99%)  Wt(kg): --  I&O's Summary    Appearance: NAD	  HEENT: Normal oral mucosa, PERRL, EOMI	  Lymphatic: No lymphadenopathy , no edema  Cardiovascular: Normal S1 S2, No JVD, No murmurs , Peripheral pulses palpable 2+ bilaterally  Respiratory: Lungs clear to auscultation, normal effort 	  Gastrointestinal:  Soft, Non-tender, + BS	  Skin: No rashes, No ecchymoses, No cyanosis, warm to touch  NEUROLOGICAL EXAM:  Mental status: Eyes open, awake, alert, oriented to self, date and hospital, speech moderately dysfluent,  follows cross commands, repetition intact  Cranial Nerves: Subtle right facial droop, no nystagmus, mild dysarthria,  tongue midline  Motor exam: Normal tone, trace Right hemiparesis  subtle drift, prox 4+/5, distally 5/5, RLE prox 4/5, distally 5/5,  mildly slow fine finger movements to right hand, LUE 5/5, LLE 5/5   Sensation: Intact to light touch   Coordination/ Gait: No dysmetria, gait deferred   Ext: No edema    LABS:    CARDIAC MARKERS:                        13.5   11.65 )-----------( 319      ( 31 May 2022 06:08 )             39.8     05-31    141  |  107  |  20  ----------------------------<  116<H>  3.7   |  21<L>  |  0.92    Ca    8.8      31 May 2022 06:06  Mg     2.1     06-01    proBNP:   Lipid Profile:   HgA1c:   TSH:     TELEMETRY: SR, PACs, PVCs, Trigem	    ECG:  	  RADIOLOGY:   DIAGNOSTIC TESTING:  [ ] Echocardiogram:  [ ]  Catheterization:  [ ] Stress Test:    OTHER:

## 2022-06-01 NOTE — PROGRESS NOTE ADULT - SUBJECTIVE AND OBJECTIVE BOX
THE PATIENT WAS SEEN AND EXAMINED BY ME WITH THE HOUSESTAFF AND STROKE TEAM DURING MORNING ROUNDS.   HPI:  HPI:67 year old left-handed male with medical history of macular degeneration (no vision loss) and EtOH use (daily 6-7 beers/day, last drink 2 days ago) who presents as a transfer from [facility] for tertiary stroke treatment.  Family at bedside to corroborate story.  Patient flew back from [place] Saturday (5/21) morning and took a cab home.  He said he "didn't feel good".  Later in the day, he was speaking one word sentences.  This morning when he woke up, family noted that he could not complete a full sentence.  Initial CTH 5/22/2022 with subacute left MCA territory infarct in the frontal lobe.  CTA with severe focal narrowing of left supraclinoid ICA extending to the carotid terminus, as well as the diminished opacification of the left anterior cerebral artery.  CTP with large penumbra in left MCA territory with mismatch volume of 12.7cc.  Patient transferred to [facility].  Stroke score of 1 for mild non-fluent aphasia. NIHSS 1, preMRS 0.    SUBJECTIVE: No events overnight.  No new neurologic complaints.  ROS reported negative unless otherwise noted.    acetaminophen     Tablet .. 650 milliGRAM(s) Oral every 6 hours PRN  aspirin 325 milliGRAM(s) Oral daily  atorvastatin 80 milliGRAM(s) Oral at bedtime  enoxaparin Injectable 40 milliGRAM(s) SubCutaneous <User Schedule>  folic acid 1 milliGRAM(s) Oral daily  latanoprost 0.005% Ophthalmic Solution 1 Drop(s) Both EYES at bedtime  lisinopril 5 milliGRAM(s) Oral daily  multivitamin 1 Tablet(s) Oral daily  polyethylene glycol 3350 17 Gram(s) Oral two times a day  senna 2 Tablet(s) Oral at bedtime  ticagrelor 60 milliGRAM(s) Oral <User Schedule>      PHYSICAL EXAM:   Vital Signs Last 24 Hrs  T(C): 36.9 (01 Jun 2022 05:33), Max: 37.2 (31 May 2022 14:00)  T(F): 98.4 (01 Jun 2022 05:33), Max: 98.9 (31 May 2022 14:00)  HR: 79 (01 Jun 2022 05:33) (65 - 79)  BP: 138/75 (01 Jun 2022 05:33) (118/62 - 155/65)  BP(mean): 75 (31 May 2022 14:00) (75 - 82)  RR: 18 (01 Jun 2022 05:33) (16 - 20)  SpO2: 95% (01 Jun 2022 05:33) (95% - 99%)    General: No acute distress  HEENT: EOM intact, BTT less on R  Abdomen: Soft, nontender, nondistended   Extremities: No edema    NEUROLOGICAL EXAM:  Mental status: Eyes open, awake, alert, oriented to self, date and hospital, speech moderately dysfluent,  follows cross commands, repetition intact  Cranial Nerves: Subtle right facial droop, no nystagmus, mild dysarthria,  tongue midline  Motor exam: Normal tone, trace Right hemiparesis  subtle drift, prox 4+/5, distally 5/5, RLE prox 4/5, distally 5/5,  mildly slow fine finger movements to right hand, LUE 5/5, LLE 5/5   Sensation: Intact to light touch   Coordination/ Gait: No dysmetria, gait deferred     LABS:                        13.5   11.65 )-----------( 319      ( 31 May 2022 06:08 )             39.8    05-31    141  |  107  |  20  ----------------------------<  116<H>  3.7   |  21<L>  |  0.92    Ca    8.8      31 May 2022 06:06  Mg     2.1     06-01          IMAGING: Reviewed by me.   MRI Head (05/27): No change in left frontal infarct compared with 5/26/2022. Improved flow in the left internal carotid artery and left anterior cerebral artery compared with 5/23/2022. Only mildly improved flow in the left MCA is related to the presence of a left M1 stenosis.    MRI HEAD 05/26/22 Post angioplasty and stenting: Again identified is an acute/subacute  left MCA territory infarct. There is no gross infarct extension or hemorrhagic conversion    MR Head/MRA Head and Neck (5/23/22): Acute left middle cerebral artery infarct similar to that predicted on the CT perfusion. No hemorrhage. Marked narrowing of the left supraclinoid internal carotid artery with diminished antegrade flow in the left anterior and middle cerebral arteries. No significant cross-filling from the right anterior communicating artery or the left posterior communicating artery. Markedly elevated flow in the left posterior cerebral artery suggesting chronicity with collateralization. Noninvasive flow MR angiography     5/22/22:  CT PERFUSION: Large ischemic penumbra in the left middle cerebral artery territory with mismatch volume of 12.7.    CTA COW:   Diminished opacification of the left internal carotid artery, with focal severe narrowing of the left supraclinoid ICA extending to the carotid terminus, as well as diminished opacification of the left anterior cerebral artery and diminished caliber and opacification of left middle cerebral artery branches, including markedly severe effacement of the left M1 segment. No vessel cut off.    CTA NECK:   Patent, ECAs, ICAs, although diffusely decreased caliber of the left internal carotid artery extending to the skull base, cannot exclude a dissection. Atherosclerotic plaque at both carotid bifurcations with less than 50% stenosis at the ICA origins by NASCET criteria. Bilateral vertebral arteries are patent without flow limiting stenosis.    CTH 5/22/22:  Subacute left middle cerebral artery territory infarct in the frontal lobe. No evidence for hemorrhagic conversion.     THE PATIENT WAS SEEN AND EXAMINED BY ME WITH THE HOUSESTAFF AND STROKE TEAM DURING MORNING ROUNDS.   HPI:  HPI:67 year old left-handed male with medical history of macular degeneration (no vision loss) and EtOH use (daily 6-7 beers/day, last drink 2 days ago) who presents as a transfer from [facility] for tertiary stroke treatment.  Family at bedside to corroborate story.  Patient flew back from [place] Saturday (5/21) morning and took a cab home.  He said he "didn't feel good".  Later in the day, he was speaking one word sentences.  This morning when he woke up, family noted that he could not complete a full sentence.  Initial CTH 5/22/2022 with subacute left MCA territory infarct in the frontal lobe.  CTA with severe focal narrowing of left supraclinoid ICA extending to the carotid terminus, as well as the diminished opacification of the left anterior cerebral artery.  CTP with large penumbra in left MCA territory with mismatch volume of 12.7cc.  Patient transferred to [facility].  Stroke score of 1 for mild non-fluent aphasia. NIHSS 1, preMRS 0.    SUBJECTIVE: No events overnight. Frustrated about not being at rehab yet.  No new neurologic complaints.  ROS reported negative unless otherwise noted.    acetaminophen     Tablet .. 650 milliGRAM(s) Oral every 6 hours PRN  aspirin 325 milliGRAM(s) Oral daily  atorvastatin 80 milliGRAM(s) Oral at bedtime  enoxaparin Injectable 40 milliGRAM(s) SubCutaneous <User Schedule>  folic acid 1 milliGRAM(s) Oral daily  latanoprost 0.005% Ophthalmic Solution 1 Drop(s) Both EYES at bedtime  lisinopril 5 milliGRAM(s) Oral daily  multivitamin 1 Tablet(s) Oral daily  polyethylene glycol 3350 17 Gram(s) Oral two times a day  senna 2 Tablet(s) Oral at bedtime  ticagrelor 60 milliGRAM(s) Oral <User Schedule>      PHYSICAL EXAM:   Vital Signs Last 24 Hrs  T(C): 36.9 (01 Jun 2022 05:33), Max: 37.2 (31 May 2022 14:00)  T(F): 98.4 (01 Jun 2022 05:33), Max: 98.9 (31 May 2022 14:00)  HR: 79 (01 Jun 2022 05:33) (65 - 79)  BP: 138/75 (01 Jun 2022 05:33) (118/62 - 155/65)  BP(mean): 75 (31 May 2022 14:00) (75 - 82)  RR: 18 (01 Jun 2022 05:33) (16 - 20)  SpO2: 95% (01 Jun 2022 05:33) (95% - 99%)    General: No acute distress  HEENT: EOM intact, BTT less on R  Abdomen: Soft, nontender, nondistended   Extremities: No edema    NEUROLOGICAL EXAM:  Mental status: Eyes open, awake, alert, oriented to self, date and hospital, speech moderately dysfluent- intermittently with some perseveration on phrase,  follows cross commands, repetition intact  Cranial Nerves: Subtle right facial droop, no nystagmus, mild dysarthria,  tongue midline  Motor exam: Normal tone, trace Right hemiparesis  subtle drift,  4+/5  RLE prox 4/5, distally 5/5,  mildly slow fine finger movements to right hand, LUE 5/5, LLE 5/5   Sensation: Intact to light touch   Coordination/ Gait: No dysmetria, gait deferred     LABS:                        13.5   11.65 )-----------( 319      ( 31 May 2022 06:08 )             39.8    05-31    141  |  107  |  20  ----------------------------<  116<H>  3.7   |  21<L>  |  0.92    Ca    8.8      31 May 2022 06:06  Mg     2.1     06-01          IMAGING: Reviewed by me.   MRI Head (05/27): No change in left frontal infarct compared with 5/26/2022. Improved flow in the left internal carotid artery and left anterior cerebral artery compared with 5/23/2022. Only mildly improved flow in the left MCA is related to the presence of a left M1 stenosis.    MRI HEAD 05/26/22 Post angioplasty and stenting: Again identified is an acute/subacute  left MCA territory infarct. There is no gross infarct extension or hemorrhagic conversion    MR Head/MRA Head and Neck (5/23/22): Acute left middle cerebral artery infarct similar to that predicted on the CT perfusion. No hemorrhage. Marked narrowing of the left supraclinoid internal carotid artery with diminished antegrade flow in the left anterior and middle cerebral arteries. No significant cross-filling from the right anterior communicating artery or the left posterior communicating artery. Markedly elevated flow in the left posterior cerebral artery suggesting chronicity with collateralization. Noninvasive flow MR angiography     5/22/22:  CT PERFUSION: Large ischemic penumbra in the left middle cerebral artery territory with mismatch volume of 12.7.    CTA COW:   Diminished opacification of the left internal carotid artery, with focal severe narrowing of the left supraclinoid ICA extending to the carotid terminus, as well as diminished opacification of the left anterior cerebral artery and diminished caliber and opacification of left middle cerebral artery branches, including markedly severe effacement of the left M1 segment. No vessel cut off.    CTA NECK:   Patent, ECAs, ICAs, although diffusely decreased caliber of the left internal carotid artery extending to the skull base, cannot exclude a dissection. Atherosclerotic plaque at both carotid bifurcations with less than 50% stenosis at the ICA origins by NASCET criteria. Bilateral vertebral arteries are patent without flow limiting stenosis.    CTH 5/22/22:  Subacute left middle cerebral artery territory infarct in the frontal lobe. No evidence for hemorrhagic conversion.

## 2022-06-01 NOTE — PROGRESS NOTE ADULT - ASSESSMENT
7 year old left-handed male with medical history of macular degeneration (no vision loss) and EtOH use (daily 6-7 beers/day, last drink 2 days ago) who presents as a transfer from [facility] for tertiary stroke treatment.  Family at bedside to corroborate story.  Patient flew back from [place] Saturday (5/21) morning and took a cab home.  He said he "didn't feel good".  Later in the day, he was speaking one word sentences.  This morning when he woke up, family noted that he could not complete a full sentence.  Initial CTH 5/22/2022 with subacute left MCA territory infarct in the frontal lobe.  CTA with severe focal narrowing of left supraclinoid ICA extending to the carotid terminus, as well as the diminished opacification of the left anterior cerebral artery.  CTP with large penumbra in left MCA territory with mismatch volume of 12.7cc.  Patient transferred to [facility].  Stroke score of 1 for mild non-fluent aphasia.    Impression.  Mild-moderate or moderate transcortical motor aphasia, due to left hemispheric border zone infarction, with left supraclinoid ICA and M1 stenosis.

## 2022-06-02 ENCOUNTER — TRANSCRIPTION ENCOUNTER (OUTPATIENT)
Age: 67
End: 2022-06-02

## 2022-06-02 VITALS
TEMPERATURE: 98 F | DIASTOLIC BLOOD PRESSURE: 79 MMHG | OXYGEN SATURATION: 98 % | HEART RATE: 68 BPM | RESPIRATION RATE: 18 BRPM | SYSTOLIC BLOOD PRESSURE: 129 MMHG

## 2022-06-02 PROCEDURE — 87798 DETECT AGENT NOS DNA AMP: CPT

## 2022-06-02 PROCEDURE — C9399: CPT

## 2022-06-02 PROCEDURE — U0005: CPT

## 2022-06-02 PROCEDURE — 93306 TTE W/DOPPLER COMPLETE: CPT

## 2022-06-02 PROCEDURE — C1894: CPT

## 2022-06-02 PROCEDURE — 97535 SELF CARE MNGMENT TRAINING: CPT

## 2022-06-02 PROCEDURE — C1887: CPT

## 2022-06-02 PROCEDURE — 70547 MR ANGIOGRAPHY NECK W/O DYE: CPT

## 2022-06-02 PROCEDURE — 70544 MR ANGIOGRAPHY HEAD W/O DYE: CPT

## 2022-06-02 PROCEDURE — 97530 THERAPEUTIC ACTIVITIES: CPT

## 2022-06-02 PROCEDURE — C1874: CPT

## 2022-06-02 PROCEDURE — 80076 HEPATIC FUNCTION PANEL: CPT

## 2022-06-02 PROCEDURE — 93005 ELECTROCARDIOGRAM TRACING: CPT

## 2022-06-02 PROCEDURE — 85730 THROMBOPLASTIN TIME PARTIAL: CPT

## 2022-06-02 PROCEDURE — C2628: CPT

## 2022-06-02 PROCEDURE — 87040 BLOOD CULTURE FOR BACTERIA: CPT

## 2022-06-02 PROCEDURE — 70551 MRI BRAIN STEM W/O DYE: CPT

## 2022-06-02 PROCEDURE — 36224 PLACE CATH CAROTD ART: CPT

## 2022-06-02 PROCEDURE — 85576 BLOOD PLATELET AGGREGATION: CPT

## 2022-06-02 PROCEDURE — 36227 PLACE CATH XTRNL CAROTID: CPT

## 2022-06-02 PROCEDURE — 61635 INTRACRAN ANGIOPLSTY W/STENT: CPT

## 2022-06-02 PROCEDURE — 80048 BASIC METABOLIC PNL TOTAL CA: CPT

## 2022-06-02 PROCEDURE — 36415 COLL VENOUS BLD VENIPUNCTURE: CPT

## 2022-06-02 PROCEDURE — 80061 LIPID PANEL: CPT

## 2022-06-02 PROCEDURE — 70450 CT HEAD/BRAIN W/O DYE: CPT

## 2022-06-02 PROCEDURE — 85025 COMPLETE CBC W/AUTO DIFF WBC: CPT

## 2022-06-02 PROCEDURE — 70553 MRI BRAIN STEM W/O & W/DYE: CPT

## 2022-06-02 PROCEDURE — 36226 PLACE CATH VERTEBRAL ART: CPT

## 2022-06-02 PROCEDURE — C9460: CPT

## 2022-06-02 PROCEDURE — 71045 X-RAY EXAM CHEST 1 VIEW: CPT

## 2022-06-02 PROCEDURE — 97166 OT EVAL MOD COMPLEX 45 MIN: CPT

## 2022-06-02 PROCEDURE — 85027 COMPLETE CBC AUTOMATED: CPT

## 2022-06-02 PROCEDURE — 86803 HEPATITIS C AB TEST: CPT

## 2022-06-02 PROCEDURE — 97116 GAIT TRAINING THERAPY: CPT

## 2022-06-02 PROCEDURE — 87086 URINE CULTURE/COLONY COUNT: CPT

## 2022-06-02 PROCEDURE — C1769: CPT

## 2022-06-02 PROCEDURE — 81001 URINALYSIS AUTO W/SCOPE: CPT

## 2022-06-02 PROCEDURE — 87207 SMEAR SPECIAL STAIN: CPT

## 2022-06-02 PROCEDURE — 99233 SBSQ HOSP IP/OBS HIGH 50: CPT

## 2022-06-02 PROCEDURE — 84100 ASSAY OF PHOSPHORUS: CPT

## 2022-06-02 PROCEDURE — 97162 PT EVAL MOD COMPLEX 30 MIN: CPT

## 2022-06-02 PROCEDURE — 85610 PROTHROMBIN TIME: CPT

## 2022-06-02 PROCEDURE — 83036 HEMOGLOBIN GLYCOSYLATED A1C: CPT

## 2022-06-02 PROCEDURE — U0003: CPT

## 2022-06-02 PROCEDURE — 83735 ASSAY OF MAGNESIUM: CPT

## 2022-06-02 PROCEDURE — 93970 EXTREMITY STUDY: CPT

## 2022-06-02 PROCEDURE — C1760: CPT

## 2022-06-02 RX ORDER — ATORVASTATIN CALCIUM 80 MG/1
1 TABLET, FILM COATED ORAL
Qty: 90 | Refills: 0
Start: 2022-06-02 | End: 2022-08-30

## 2022-06-02 RX ORDER — LISINOPRIL 2.5 MG/1
5 TABLET ORAL ONCE
Refills: 0 | Status: COMPLETED | OUTPATIENT
Start: 2022-06-02 | End: 2022-06-02

## 2022-06-02 RX ORDER — ASPIRIN/CALCIUM CARB/MAGNESIUM 324 MG
1 TABLET ORAL
Qty: 90 | Refills: 0
Start: 2022-06-02 | End: 2022-08-30

## 2022-06-02 RX ORDER — TICAGRELOR 90 MG/1
1 TABLET ORAL
Qty: 180 | Refills: 0
Start: 2022-06-02 | End: 2022-08-30

## 2022-06-02 RX ORDER — LISINOPRIL 2.5 MG/1
1 TABLET ORAL
Qty: 60 | Refills: 0
Start: 2022-06-02 | End: 2022-07-31

## 2022-06-02 RX ORDER — LISINOPRIL 2.5 MG/1
10 TABLET ORAL DAILY
Refills: 0 | Status: DISCONTINUED | OUTPATIENT
Start: 2022-06-03 | End: 2022-06-02

## 2022-06-02 RX ORDER — LISINOPRIL 2.5 MG/1
1 TABLET ORAL
Qty: 0 | Refills: 0 | DISCHARGE
Start: 2022-06-02

## 2022-06-02 RX ADMIN — TICAGRELOR 60 MILLIGRAM(S): 90 TABLET ORAL at 00:45

## 2022-06-02 RX ADMIN — LISINOPRIL 5 MILLIGRAM(S): 2.5 TABLET ORAL at 05:24

## 2022-06-02 RX ADMIN — LISINOPRIL 5 MILLIGRAM(S): 2.5 TABLET ORAL at 11:40

## 2022-06-02 RX ADMIN — Medication 1 TABLET(S): at 11:40

## 2022-06-02 RX ADMIN — Medication 81 MILLIGRAM(S): at 11:39

## 2022-06-02 RX ADMIN — Medication 1 MILLIGRAM(S): at 11:40

## 2022-06-02 RX ADMIN — TICAGRELOR 60 MILLIGRAM(S): 90 TABLET ORAL at 11:39

## 2022-06-02 NOTE — PROGRESS NOTE ADULT - ASSESSMENT
67 year old left-handed male with medical history of macular degeneration (no vision loss) and EtOH use (daily 6-7 beers/day, last drink 2 days ago) who presents as a transfer from [facility] for tertiary stroke treatment.  Family at bedside to corroborate story.  Patient flew back from [place] Saturday (5/21) morning and took a cab home.  He said he "didn't feel good".  Later in the day, he was speaking one word sentences.  This morning when he woke up, family noted that he could not complete a full sentence.  Initial CTH 5/22/2022 with subacute left MCA territory infarct in the frontal lobe.  CTA with severe focal narrowing of left supraclinoid ICA extending to the carotid terminus, as well as the diminished opacification of the left anterior cerebral artery.  CTP with large penumbra in left MCA territory with mismatch volume of 12.7cc.  Patient transferred to [facility].  Stroke score of 1 for mild non-fluent aphasia.    Impression.  Mild-moderate or moderate transcortical motor aphasia, due to left hemispheric border zone infarction, with left supraclinoid ICA and M1 stenosis.  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     NEURO: neurologically now without acute change,  exam worse morning of 05/26 with right hemiparesis now s/p  left carotid stent for symptomatic severe carotid stenosis by Dr. Medina, exam now improved and overall without acute change.  : started on high dose statin,   MRI Brain w/o, MRA Head and Neck w/o results as noted above. Physical therapy/OT recommending acute rehab.     ANTITHROMBOTIC THERAPY:   on Brilinta, will continue Aspirin for left supraclinoid ICA stent.[id number]: 92 (05/28)     PULMONARY: CXR (05/25) clear, Protecting airway, saturating well     CARDIOVASCULAR: TTE shows EF of 65%, Normal left ventricular systolic function. No segmental wall motion abnormalities, continue with cardiac monitoring. close cardiology follow up                           SBP goal: 110-140    GASTROINTESTINAL:  dysphagia screen passed, tolerating diet      Diet: Regular     RENAL: BUN/Cr without acute change, good urine output , maintain adequate hydration      Na Goal: Greater than 135     Newman: No    HEMATOLOGY: H/H anemia- improved, will continue to monitor, Platelets 319, no signs of bleeding. LE doppler (05/24): No evidence of DVT in either lower extremity.  DVT ppx: LMWH      ID: afebrile, Tmax 38 (05/25), leukocytosis overall improving, infectious work up negative, prior fever likely reactive to the stroke, will continue to monitor off antibiotics. MIcroti PCR: Negative. ID consult appreciated, Fevers after trip to [facility] have resolved. Malaria smear , PCR, and blood cultures have been negative. His fevers have spontaneously resolved.  No additional ID w/u planned No ID objection to discharge planning.    OTHER: All questions and concerns addressed with patient bedside. Alcohol abuse counseling provided, will continue [facility] protocol     DISPOSITION: Acute Rehab as per PT/OT eval once stable and workup is complete.    CORE MEASURES:        Admission NIHSS: 1     TPA:  NO      LDL/HDL: 140/48     Depression Screen: N/A unable to participate with questionnaire     Statin Therapy: y     Dysphagia Screen:  PASS      Smoking NO      Afib NO     Stroke Education  YES    Obtain screening lower extremity venous ultrasound in patients who meet 1 or more of the following criteria as patient is high risk for DVT/PE on admission:   [] History of DVT/PE  []Hypercoagulable states (Factor V Leiden, Cancer, OCP, etc. )  [x]Prolonged immobility (hemiplegia/hemiparesis/post operative or any other extended immobilization)- flight   [] Transferred from outside facility (Rehab or Long term care)  [] Age </= to 50.

## 2022-06-02 NOTE — PROGRESS NOTE ADULT - PROBLEM SELECTOR PLAN 1
left hemispheric border zone infarction, with left supraclinoid ICA and M1 stenosis    - mechanism is most likely intracranial large artery atherosclerosis      - s/p left carotid stent for symptomatic severe carotid stenosis  DAPT and statin   TTE - EF 65%, normal  monitor on tele   neuro checks  orders per neuro team  PT/OT
left hemispheric border zone infarction, with left supraclinoid ICA and M1 stenosis    - mechanism is most likely intracranial large artery atherosclerosis      - s/p left carotid stent for symptomatic severe carotid stenosis  DAPT and statin   TTE - EF 65%, normal  monitor on tele   neuro checks  orders per neuro team  PT/OT
DAPT and statin   Monitor on tele   SBP goal: 110-140.

## 2022-06-02 NOTE — PROGRESS NOTE ADULT - PROBLEM SELECTOR PLAN 2
SBP goal: 110-140  cont with meds   Low salt diet counseling.
SBP goal: 110-140  cont with meds   Low salt diet counseling
SBP goal: 110-140  cont with meds   Low salt diet counseling

## 2022-06-02 NOTE — DISCHARGE NOTE NURSING/CASE MANAGEMENT/SOCIAL WORK - NSDCPEFALRISK_GEN_ALL_CORE
For information on Fall & Injury Prevention, visit: [email or web address] OR  [email or web address] OR  https://www.cdc.gov/steadi/patient.html

## 2022-06-02 NOTE — DISCHARGE NOTE NURSING/CASE MANAGEMENT/SOCIAL WORK - PATIENT PORTAL LINK FT
You can access the FollowMyHealth Patient Portal offered by [facility] by registering at the following website: [email or web address]. By joining [facility]’s FollowMyHealth portal, you will also be able to view your health information using other applications (apps) compatible with our system.

## 2022-06-02 NOTE — PROGRESS NOTE ADULT - PROVIDER SPECIALTY LIST ADULT
NSICU
Neurosurgery
Infectious Disease
Infectious Disease
NSICU
Neurology
Neurology
Cardiology
Infectious Disease
Infectious Disease
Neurology
Neurosurgery
Neurosurgery
Rehab Medicine
Rehab Medicine
NSICU
Neurology
Cardiology
NSICU
Cardiology

## 2022-06-02 NOTE — PROGRESS NOTE ADULT - SUBJECTIVE AND OBJECTIVE BOX
THE PATIENT WAS SEEN AND EXAMINED BY ME WITH THE HOUSESTAFF AND STROKE TEAM DURING MORNING ROUNDS.   HPI:  HPI:67 year old left-handed male with medical history of macular degeneration (no vision loss) and EtOH use (daily 6-7 beers/day, last drink 2 days ago) who presents as a transfer from [facility] for tertiary stroke treatment.  Family at bedside to corroborate story.  Patient flew back from [place] Saturday (5/21) morning and took a cab home.  He said he "didn't feel good".  Later in the day, he was speaking one word sentences.  This morning when he woke up, family noted that he could not complete a full sentence.  Initial CTH 5/22/2022 with subacute left MCA territory infarct in the frontal lobe.  CTA with severe focal narrowing of left supraclinoid ICA extending to the carotid terminus, as well as the diminished opacification of the left anterior cerebral artery.  CTP with large penumbra in left MCA territory with mismatch volume of 12.7cc.  Patient transferred to [facility].  Stroke score of 1 for mild non-fluent aphasia. NIHSS 1, preMRS 0.    SUBJECTIVE: No events overnight.  No new neurologic complaints.      acetaminophen     Tablet .. 650 milliGRAM(s) Oral every 6 hours PRN  aspirin  chewable 81 milliGRAM(s) Oral daily  atorvastatin 80 milliGRAM(s) Oral at bedtime  enoxaparin Injectable 40 milliGRAM(s) SubCutaneous <User Schedule>  folic acid 1 milliGRAM(s) Oral daily  latanoprost 0.005% Ophthalmic Solution 1 Drop(s) Both EYES at bedtime  lisinopril 5 milliGRAM(s) Oral daily  multivitamin 1 Tablet(s) Oral daily  polyethylene glycol 3350 17 Gram(s) Oral two times a day  senna 2 Tablet(s) Oral at bedtime  ticagrelor 60 milliGRAM(s) Oral <User Schedule>      PHYSICAL EXAM:   Vital Signs Last 24 Hrs  T(C): 36.9 (02 Jun 2022 05:33), Max: 36.9 (01 Jun 2022 20:41)  T(F): 98.5 (02 Jun 2022 05:33), Max: 98.5 (02 Jun 2022 05:33)  HR: 67 (02 Jun 2022 05:33) (64 - 71)  BP: 157/88 (02 Jun 2022 05:33) (123/68 - 181/69)  BP(mean): 68 (01 Jun 2022 13:10) (68 - 68)  RR: 18 (02 Jun 2022 05:33) (18 - 18)  SpO2: 96% (02 Jun 2022 05:33) (95% - 98%)    General: No acute distress  HEENT: EOM intact, BTT less on R  Abdomen: Soft, nontender, nondistended   Extremities: No edema    NEUROLOGICAL EXAM:  Mental status: Eyes open, awake, alert, oriented to self, date and hospital, speech moderately dysfluent- intermittently with some perseveration on phrase,  follows cross commands, repetition intact  Cranial Nerves: Subtle right facial droop, no nystagmus, mild dysarthria,  tongue midline  Motor exam: Normal tone, trace Right hemiparesis  subtle drift,  4+/5  RLE prox 4/5, distally 5/5,  mildly slow fine finger movements to right hand, LUE 5/5, LLE 5/5   Sensation: Intact to light touch   Coordination/ Gait: No dysmetria, gait deferred     LABS:     Mg     2.1     06-01          IMAGING: Reviewed by me.   MRI Head (05/27): No change in left frontal infarct compared with 5/26/2022. Improved flow in the left internal carotid artery and left anterior cerebral artery compared with 5/23/2022. Only mildly improved flow in the left MCA is related to the presence of a left M1 stenosis.    MRI HEAD 05/26/22 Post angioplasty and stenting: Again identified is an acute/subacute  left MCA territory infarct. There is no gross infarct extension or hemorrhagic conversion    MR Head/MRA Head and Neck (5/23/22): Acute left middle cerebral artery infarct similar to that predicted on the CT perfusion. No hemorrhage. Marked narrowing of the left supraclinoid internal carotid artery with diminished antegrade flow in the left anterior and middle cerebral arteries. No significant cross-filling from the right anterior communicating artery or the left posterior communicating artery. Markedly elevated flow in the left posterior cerebral artery suggesting chronicity with collateralization. Noninvasive flow MR angiography     5/22/22:  CT PERFUSION: Large ischemic penumbra in the left middle cerebral artery territory with mismatch volume of 12.7.    CTA COW:   Diminished opacification of the left internal carotid artery, with focal severe narrowing of the left supraclinoid ICA extending to the carotid terminus, as well as diminished opacification of the left anterior cerebral artery and diminished caliber and opacification of left middle cerebral artery branches, including markedly severe effacement of the left M1 segment. No vessel cut off.    CTA NECK:   Patent, ECAs, ICAs, although diffusely decreased caliber of the left internal carotid artery extending to the skull base, cannot exclude a dissection. Atherosclerotic plaque at both carotid bifurcations with less than 50% stenosis at the ICA origins by NASCET criteria. Bilateral vertebral arteries are patent without flow limiting stenosis.    CTH 5/22/22:  Subacute left middle cerebral artery territory infarct in the frontal lobe. No evidence for hemorrhagic conversion.     THE PATIENT WAS SEEN AND EXAMINED BY ME WITH THE HOUSESTAFF AND STROKE TEAM DURING MORNING ROUNDS.   HPI:  HPI:67 year old left-handed male with medical history of macular degeneration (no vision loss) and EtOH use (daily 6-7 beers/day, last drink 2 days ago) who presents as a transfer from [facility] for tertiary stroke treatment.  Family at bedside to corroborate story.  Patient flew back from [place] Saturday (5/21) morning and took a cab home.  He said he "didn't feel good".  Later in the day, he was speaking one word sentences.  This morning when he woke up, family noted that he could not complete a full sentence.  Initial CTH 5/22/2022 with subacute left MCA territory infarct in the frontal lobe.  CTA with severe focal narrowing of left supraclinoid ICA extending to the carotid terminus, as well as the diminished opacification of the left anterior cerebral artery.  CTP with large penumbra in left MCA territory with mismatch volume of 12.7cc.  Patient transferred to [facility].  Stroke score of 1 for mild non-fluent aphasia. NIHSS 1, preMRS 0.    SUBJECTIVE: No events overnight.  No new neurologic complaints.      acetaminophen     Tablet .. 650 milliGRAM(s) Oral every 6 hours PRN  aspirin  chewable 81 milliGRAM(s) Oral daily  atorvastatin 80 milliGRAM(s) Oral at bedtime  enoxaparin Injectable 40 milliGRAM(s) SubCutaneous <User Schedule>  folic acid 1 milliGRAM(s) Oral daily  latanoprost 0.005% Ophthalmic Solution 1 Drop(s) Both EYES at bedtime  lisinopril 5 milliGRAM(s) Oral daily  multivitamin 1 Tablet(s) Oral daily  polyethylene glycol 3350 17 Gram(s) Oral two times a day  senna 2 Tablet(s) Oral at bedtime  ticagrelor 60 milliGRAM(s) Oral <User Schedule>      PHYSICAL EXAM:   Vital Signs Last 24 Hrs  T(C): 36.9 (02 Jun 2022 05:33), Max: 36.9 (01 Jun 2022 20:41)  T(F): 98.5 (02 Jun 2022 05:33), Max: 98.5 (02 Jun 2022 05:33)  HR: 67 (02 Jun 2022 05:33) (64 - 71)  BP: 157/88 (02 Jun 2022 05:33) (123/68 - 181/69)  BP(mean): 68 (01 Jun 2022 13:10) (68 - 68)  RR: 18 (02 Jun 2022 05:33) (18 - 18)  SpO2: 96% (02 Jun 2022 05:33) (95% - 98%)    General: No acute distress  HEENT: EOM intact, BTT less on R  Abdomen: Soft, nontender, nondistended   Extremities: No edema    NEUROLOGICAL EXAM:  Mental status: Eyes open, awake, alert, oriented to self, date and hospital, speech mildly dysfluent- intermittently with some perseveration on phrase,  follows cross commands, repetition intact  Cranial Nerves: Subtle right facial droop, no nystagmus, mild dysarthria,  tongue midline  Motor exam: Normal tone, trace Right hemiparesis  subtle drift,  4+/5  RLE prox 4/5, distally 5/5,  mildly slow fine finger movements to right hand, LUE 5/5, LLE 5/5   Sensation: Intact to light touch   Coordination/ Gait: No dysmetria, gait deferred     LABS:     Mg     2.1     06-01          IMAGING: Reviewed by me.   MRI Head (05/27): No change in left frontal infarct compared with 5/26/2022. Improved flow in the left internal carotid artery and left anterior cerebral artery compared with 5/23/2022. Only mildly improved flow in the left MCA is related to the presence of a left M1 stenosis.    MRI HEAD 05/26/22 Post angioplasty and stenting: Again identified is an acute/subacute  left MCA territory infarct. There is no gross infarct extension or hemorrhagic conversion    MR Head/MRA Head and Neck (5/23/22): Acute left middle cerebral artery infarct similar to that predicted on the CT perfusion. No hemorrhage. Marked narrowing of the left supraclinoid internal carotid artery with diminished antegrade flow in the left anterior and middle cerebral arteries. No significant cross-filling from the right anterior communicating artery or the left posterior communicating artery. Markedly elevated flow in the left posterior cerebral artery suggesting chronicity with collateralization. Noninvasive flow MR angiography     5/22/22:  CT PERFUSION: Large ischemic penumbra in the left middle cerebral artery territory with mismatch volume of 12.7.    CTA COW:   Diminished opacification of the left internal carotid artery, with focal severe narrowing of the left supraclinoid ICA extending to the carotid terminus, as well as diminished opacification of the left anterior cerebral artery and diminished caliber and opacification of left middle cerebral artery branches, including markedly severe effacement of the left M1 segment. No vessel cut off.    CTA NECK:   Patent, ECAs, ICAs, although diffusely decreased caliber of the left internal carotid artery extending to the skull base, cannot exclude a dissection. Atherosclerotic plaque at both carotid bifurcations with less than 50% stenosis at the ICA origins by NASCET criteria. Bilateral vertebral arteries are patent without flow limiting stenosis.    CTH 5/22/22:  Subacute left middle cerebral artery territory infarct in the frontal lobe. No evidence for hemorrhagic conversion.

## 2022-06-02 NOTE — PROGRESS NOTE ADULT - ASSESSMENT
7 year old left-handed male with medical history of macular degeneration (no vision loss) and EtOH use (daily 6-7 beers/day, last drink 2 days ago) who presents as a transfer from [facility] for tertiary stroke treatment.  Family at bedside to corroborate story.  Patient flew back from [place] Saturday (5/21) morning and took a cab home.  He said he "didn't feel good".  Later in the day, he was speaking one word sentences.  This morning when he woke up, family noted that he could not complete a full sentence.  Initial CTH 5/22/2022 with subacute left MCA territory infarct in the frontal lobe.  CTA with severe focal narrowing of left supraclinoid ICA extending to the carotid terminus, as well as the diminished opacification of the left anterior cerebral artery.  CTP with large penumbra in left MCA territory with mismatch volume of 12.7cc.  Patient transferred to [facility].  Stroke score of 1 for mild non-fluent aphasia.    Impression.  Mild-moderate or moderate transcortical motor aphasia, due to left hemispheric border zone infarction, with left supraclinoid ICA and M1 stenosis.

## 2022-06-02 NOTE — PROGRESS NOTE ADULT - REASON FOR ADMISSION
Left MCA ischemic infarct
Left ICA stent
Left MCA ischemic infarct
Left ICA stenting
Left MCA ischemic infarct

## 2022-06-02 NOTE — PROGRESS NOTE ADULT - TIME BILLING
Advanced care planning was discussed with patient and family.  Advanced care planning forms were reviewed and discussed as appropriate.  Differential diagnosis and plan of care discussed with patient after the evaluation.   Pain assessed and judicious use of narcotics when appropriate was discussed.  Importance of Fall prevention discussed.  Counseling on Smoking and Alcohol cessation was offered when appropriate.  Counseling on Diet, exercise, and medication compliance was done.
Left supraclinoid ICA stenosis   Day 3 post left ICA stenting   q2h neuro checks  MRI brain no stroke    mg  plavix 75 mg daily   P2Y12 254 WILL INFORM ns AS CANGRELOR DRIP WAS DISCONTINUED TODAY
Diagnosis and treatment plan; counselling for secondary stroke prevention  Agree with above; ROS otherwise negative
Left supraclinoid ICA stenosis   Day 1 post left ICA stenting   q2h neuro checks   mg, Cangrelor gtt, MRI NOVA, will transition to plavix
Diagnosis and treatment plan; counselling for secondary stroke prevention  Agree with above; ROS otherwise negative
Advanced care planning was discussed with patient and family.  Advanced care planning forms were reviewed and discussed as appropriate.  Differential diagnosis and plan of care discussed with patient after the evaluation.   Pain assessed and judicious use of narcotics when appropriate was discussed.  Importance of Fall prevention discussed.  Counseling on Smoking and Alcohol cessation was offered when appropriate.  Counseling on Diet, exercise, and medication compliance was done.

## 2022-06-02 NOTE — PROGRESS NOTE ADULT - SUBJECTIVE AND OBJECTIVE BOX
Subjective: Patient seen and examined. No new events except as noted.     REVIEW OF SYSTEMS:    CONSTITUTIONAL: N+weakness, fevers or chills  EYES/ENT: No visual changes;  No vertigo or throat pain   NECK: No pain or stiffness  RESPIRATORY: No cough, wheezing, hemoptysis; No shortness of breath  CARDIOVASCULAR: No chest pain or palpitations  GASTROINTESTINAL: No abdominal or epigastric pain. No nausea, vomiting, or hematemesis; No diarrhea or constipation. No melena or hematochezia.  GENITOURINARY: No dysuria, frequency or hematuria  NEUROLOGICAL: No numbness or weakness  SKIN: No itching, burning, rashes, or lesions   All other review of systems is negative unless indicated above.    MEDICATIONS:  MEDICATIONS  (STANDING):  aspirin  chewable 81 milliGRAM(s) Oral daily  atorvastatin 80 milliGRAM(s) Oral at bedtime  enoxaparin Injectable 40 milliGRAM(s) SubCutaneous <User Schedule>  folic acid 1 milliGRAM(s) Oral daily  latanoprost 0.005% Ophthalmic Solution 1 Drop(s) Both EYES at bedtime  lisinopril 5 milliGRAM(s) Oral daily  multivitamin 1 Tablet(s) Oral daily  polyethylene glycol 3350 17 Gram(s) Oral two times a day  senna 2 Tablet(s) Oral at bedtime  ticagrelor 60 milliGRAM(s) Oral <User Schedule>      PHYSICAL EXAM:  T(C): 36.9 (06-02-22 @ 05:33), Max: 36.9 (06-01-22 @ 20:41)  HR: 67 (06-02-22 @ 05:33) (64 - 71)  BP: 157/88 (06-02-22 @ 05:33) (123/68 - 181/69)  RR: 18 (06-02-22 @ 05:33) (18 - 18)  SpO2: 96% (06-02-22 @ 05:33) (95% - 98%)  Wt(kg): --  I&O's Summary    01 Jun 2022 07:01  -  02 Jun 2022 07:00  --------------------------------------------------------  IN: 600 mL / OUT: 1650 mL / NET: -1050 mL          Appearance: NAD	  HEENT: Normal oral mucosa, PERRL, EOMI	  Lymphatic: No lymphadenopathy , no edema  Cardiovascular: Normal S1 S2, No JVD, No murmurs , Peripheral pulses palpable 2+ bilaterally  Respiratory: Lungs clear to auscultation, normal effort 	  Gastrointestinal:  Soft, Non-tender, + BS	  Skin: No rashes, No ecchymoses, No cyanosis, warm to touch  NEUROLOGICAL EXAM:  Mental status: Eyes open, awake, alert, oriented to self, date and hospital, speech moderately dysfluent,  follows cross commands, repetition intact  Cranial Nerves: Subtle right facial droop, no nystagmus, mild dysarthria,  tongue midline  Motor exam: Normal tone, trace Right hemiparesis  subtle drift, prox 4+/5, distally 5/5, RLE prox 4/5, distally 5/5,  mildly slow fine finger movements to right hand, LUE 5/5, LLE 5/5   Sensation: Intact to light touch   Coordination/ Gait: No dysmetria, gait deferred   Ext: No edema      LABS:    CARDIAC MARKERS:              Mg     2.1     06-01      proBNP:   Lipid Profile:   HgA1c:   TSH:             TELEMETRY: 	    ECG:  	  RADIOLOGY:   DIAGNOSTIC TESTING:  [ ] Echocardiogram:  [ ]  Catheterization:  [ ] Stress Test:    OTHER:

## 2022-06-15 ENCOUNTER — APPOINTMENT (OUTPATIENT)
Dept: NEUROSURGERY | Facility: CLINIC | Age: 67
End: 2022-06-15
Payer: MEDICARE

## 2022-06-15 VITALS
BODY MASS INDEX: 27.09 KG/M2 | OXYGEN SATURATION: 97 % | DIASTOLIC BLOOD PRESSURE: 80 MMHG | SYSTOLIC BLOOD PRESSURE: 136 MMHG | HEART RATE: 64 BPM | HEIGHT: 72 IN | WEIGHT: 200 LBS

## 2022-06-15 PROCEDURE — 99213 OFFICE O/P EST LOW 20 MIN: CPT

## 2022-06-15 NOTE — ASSESSMENT
[FreeTextEntry1] : Impression: \par 67M with PMH of macular degeneration, EtOH use, recent trip to [place], p/w expressive aphasia, found to have L MCA infarct, ischemic stroke in the setting of L supraclinoid ICA severe stenosis s/p dx angio 5/23 demonstrating diffuse intracranial atherosclerotic disease, most severe within L ICA supraclinoid segment where there is a focal point of near complete occlusion just proximal to the ICA terminus resulting in significant flow limitation. 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 Exam worse 5/26 with right hemiparesis, s/p successful left supraclinoid ICA drug eluting stent with improvement in flow throughout left MCA territory but residual moderate to severe narrowing within the proximal M1 segment of the left MCA. Discharged on ASA 81 and Brilinta 60mg BID.\par \par On his post hospitalization visit, he is doing clinically well. Reports improvement in speech. Denies weakness on one side of the body, stroke-like symptoms, and other symptoms of motor, sensory, speech, or visual abnormalities. Denies issues such as bleeding and bruising with groin puncture site.\par \par His post-stenting NOVA showed some residual limitation in flow in the left MCA, though improved from prior. We discussed the risks vs benefits of placing another stent within the residual narrowing in the M1. Since he is clinically doing much better, at this time the risks of additional stent outweigh the benefits, particularly in the M1 where there are lenticulostriate perforators. If the flow in the left MCA continues to decrease on subsequent NOVA or he has new symptoms, we can discuss additional stenting. \par \par \par Plan:\par Follow up with stroke neurology\par Repeat MRI brain non con, MRA brain non con NOVA in 3 months - August 2022\par Follow up in office after to discuss results

## 2022-06-15 NOTE — HISTORY OF PRESENT ILLNESS
[FreeTextEntry1] : Timmy Newman is a left handed 67 year old male with PMH of macular degeneration (no vision loss) and EtOH use (daily 6-7 beers/day), recently returned from [place] on 5/21 not feeling well, presented to [facility] with acute expressive aphasia/one word answers, CTH showed L MCA territory infarct in L frontal lobe, CTA showed severe focal stenosis of left supraclinoid ICA, transferred to [facility] for tertiary stroke treatment on 5/22.  Diagnostic Cerebral angiogram on 5/23/2022 demonstrated severe stenosis with near complete occlusion of the supraclinoid left ICA causing intracranial flow limitation. On 5/26, acute worsening right sided weakness s/p intracranial stenting of the left supraclinoid ICA using Resolute drug eluting stent. There is residual moderate to severe narrowing within the proximal M1 segment of the left MCA. Discharged home on aspirin 81mg daily and Brilinta 60mg BID.\par \par Today on his post hospitalization visit, he is recovering overall well. Involved with speech therapy, states improvement in his speech although he does still mildly stumble over some words occasionally. Denies weakness, and other symptoms of motor, sensory, speech, or visual abnormalities. Uses cane for assistance ambulating. Denies issues with puncture site.  Canthacur Duration Text (Please Remove Duration From Postcare): The patient was instructed to leave the Canthacur on for 6-8 hours and then wash the area well with soap and water. Add 52 Modifier (Optional): no Consent: The patient's consent was obtained including but not limited to risks of crusting, scabbing, scarring, blistering, darker or lighter pigmentary change, recurrence, incomplete removal and infection. Curette Text: Prior to application of cantharidin the lesions were lightly pared with a curette. Medical Necessity Information: It is in your best interest to select a reason for this procedure from the list below. All of these items fulfill various CMS LCD requirements except the new and changing color options. Cantharone Plus Duration Text (Please Remove Duration From Postcare): The patient was instructed to leave the Cantharone Plus on for 6-8 hours and then wash the area well with soap and water. Cantharone Forte Duration Text (Please Remove Duration From Postcare): The patient was instructed to leave the Cantharone Forte on for 6-8 hours and then wash the area well with soap and water. Post-Care Instructions: I reviewed with the patient in detail post-care instructions. The patient understands that the treated areas should be washed off 3 hours after application. Cantharone Duration Text (Please Remove Duration From Postcare): The patient was instructed to leave the Cantharone on for 1 hour and then wash the area well with soap and water. Strength: [place] Medical Necessity Clause: This procedure was medically necessary because the lesions that were treated were: Canthacur Ps Duration Text (Please Remove Duration From Postcare): The patient was instructed to leave the Canthacur PS on for 6-8 hours and then wash the area well with soap and water. Detail Level: Simple

## 2022-06-15 NOTE — REVIEW OF SYSTEMS
[Difficulties in Speech] : speech difficulties [As Noted in HPI] : as noted in HPI [Negative] : Heme/Lymph [de-identified] : b [FreeTextEntry9] : uses cane for assistance

## 2022-06-16 ENCOUNTER — NON-APPOINTMENT (OUTPATIENT)
Age: 67
End: 2022-06-16

## 2022-06-16 ENCOUNTER — APPOINTMENT (OUTPATIENT)
Dept: CARDIOLOGY | Facility: CLINIC | Age: 67
End: 2022-06-16
Payer: MEDICARE

## 2022-06-16 VITALS
HEIGHT: 72 IN | OXYGEN SATURATION: 97 % | RESPIRATION RATE: 17 BRPM | WEIGHT: 190 LBS | HEART RATE: 77 BPM | TEMPERATURE: 97.5 F | SYSTOLIC BLOOD PRESSURE: 138 MMHG | BODY MASS INDEX: 25.73 KG/M2 | DIASTOLIC BLOOD PRESSURE: 75 MMHG

## 2022-06-16 PROCEDURE — 99204 OFFICE O/P NEW MOD 45 MIN: CPT

## 2022-06-16 PROCEDURE — 93000 ELECTROCARDIOGRAM COMPLETE: CPT

## 2022-06-17 ENCOUNTER — NON-APPOINTMENT (OUTPATIENT)
Age: 67
End: 2022-06-17

## 2022-06-19 NOTE — HISTORY OF PRESENT ILLNESS
[FreeTextEntry1] : Timmy Newman is a 67 year old man with past medical history of Macular degeneration & Alcohol use with recent hospitalization for difficulty with speech found to have acute left MCA territory infarct in frontal lobe and severe focal narrowing of left supraclinoid ICA cerebral artery s/p left supraclinoid ICA stent presents for general cardiac evaluation and second opinion regarding cardiac workup.\par \par The patient denies prior history of myocardial infarction or coronary stenting. Reports that he spends time in [place] and the difficulty with speech began in the airport when he arrived from [place]. Reports that his speech is still not at baseline, he reports getting speech therapy at home. Denies any exertional chest pain or shortness of breath. Denies palpitations or syncope. Denies paroxysmal nocturnal dyspnea. Has mild ankle swelling.

## 2022-06-19 NOTE — PHYSICAL EXAM
[Normal Conjunctiva] : normal conjunctiva [Normal] : alert and oriented, normal memory [de-identified] : elderly man, no acute distress [de-identified] : supple, no carotid bruits b/l [de-identified] : JVP ~ 7 cm H20, RRR, s1, s2, no murmurs/rubs [de-identified] : unlabored respirations, CTAB [de-identified] : minimal ankle edema

## 2022-06-19 NOTE — CARDIOLOGY SUMMARY
[de-identified] : ECG (6/16/22): normal sinus rhythm  [de-identified] : TTE (5/23/22): LVEF 65%. Normal RV size and function. No pericardial effusion.

## 2022-06-19 NOTE — ASSESSMENT
[FreeTextEntry1] : Assessment:\par Timmy Newman is a 67 year old man with past medical history of Macular degeneration & Alcohol use with recent hospitalization for difficulty with speech found to have acute left MCA territory infarct in frontal lobe and severe focal narrowing of left supraclinoid ICA cerebral artery s/p left supraclinoid ICA stent presents for general cardiac evaluation and second opinion regarding cardiac workup.\par \par The patient denies prior history of myocardial infarction or coronary stenting. Review of chart indicates that his stroke was likely due to ICA stenosis which was treated with stenting. ECG consistent with normal sinus rhythm, no acute ischemic ST abnormalities. Recent echo consistent with normal LV and RV systolic function. Patient has family history of CAD and personal risk factors and so would recommend an ischemic evaluation with coronary CTA.\par \par Recommendations:\par [] CVA: Given cerebrovascular disease, will check coronary CTA to ensure no underlying coronary vascular disease. Patient reports that he would like to think about if he would like to do CTA vs nuclear stress test (that another cardiologist recommended). Continue Aspirin 81 mg daily & Brilinta per Neurosurgery. Continue high intensity statin. Follow up Neurosurgery\par [] Risk factors: Patient should also avoid alcohol use. Will need to check Lipid panel. \par [] Return to office: Patient will inform office if he would like to follow up in this office or with primary cardiologist

## 2022-06-19 NOTE — REVIEW OF SYSTEMS
[Fever] : no fever [Headache] : no headache [Chills] : no chills [SOB] : no shortness of breath [Chest Discomfort] : no chest discomfort [Lower Ext Edema] : no extremity edema [Palpitations] : no palpitations [Cough] : no cough [Abdominal Pain] : no abdominal pain [Joint Pain] : no joint pain [Rash] : no rash [Dizziness] : no dizziness [Confusion] : no confusion was observed [Easy Bruising] : no tendency for easy bruising

## 2022-06-20 ENCOUNTER — APPOINTMENT (OUTPATIENT)
Dept: NEUROLOGY | Facility: CLINIC | Age: 67
End: 2022-06-20
Payer: MEDICARE

## 2022-06-20 VITALS — HEART RATE: 114 BPM | DIASTOLIC BLOOD PRESSURE: 68 MMHG | SYSTOLIC BLOOD PRESSURE: 105 MMHG

## 2022-06-20 PROCEDURE — 99214 OFFICE O/P EST MOD 30 MIN: CPT

## 2022-06-22 ENCOUNTER — OUTPATIENT (OUTPATIENT)
Dept: OUTPATIENT SERVICES | Facility: HOSPITAL | Age: 67
LOS: 1 days | End: 2022-06-22
Payer: MEDICARE

## 2022-06-22 ENCOUNTER — APPOINTMENT (OUTPATIENT)
Dept: RADIOLOGY | Facility: HOSPITAL | Age: 67
End: 2022-06-22
Payer: MEDICARE

## 2022-06-22 DIAGNOSIS — Z98.89 OTHER SPECIFIED POSTPROCEDURAL STATES: Chronic | ICD-10-CM

## 2022-06-22 DIAGNOSIS — Z96.652 PRESENCE OF LEFT ARTIFICIAL KNEE JOINT: Chronic | ICD-10-CM

## 2022-06-22 DIAGNOSIS — Z00.8 ENCOUNTER FOR OTHER GENERAL EXAMINATION: ICD-10-CM

## 2022-06-22 PROCEDURE — 73560 X-RAY EXAM OF KNEE 1 OR 2: CPT

## 2022-06-22 PROCEDURE — 73560 X-RAY EXAM OF KNEE 1 OR 2: CPT | Mod: 26,RT

## 2022-06-28 ENCOUNTER — NON-APPOINTMENT (OUTPATIENT)
Age: 67
End: 2022-06-28

## 2022-07-04 ENCOUNTER — EMERGENCY (EMERGENCY)
Facility: HOSPITAL | Age: 67
LOS: 1 days | Discharge: HOME | End: 2022-07-04
Attending: STUDENT IN AN ORGANIZED HEALTH CARE EDUCATION/TRAINING PROGRAM | Admitting: STUDENT IN AN ORGANIZED HEALTH CARE EDUCATION/TRAINING PROGRAM
Payer: MEDICARE

## 2022-07-04 VITALS
DIASTOLIC BLOOD PRESSURE: 71 MMHG | HEIGHT: 72 IN | TEMPERATURE: 99 F | SYSTOLIC BLOOD PRESSURE: 125 MMHG | HEART RATE: 98 BPM | WEIGHT: 126.32 LBS | RESPIRATION RATE: 19 BRPM | OXYGEN SATURATION: 98 %

## 2022-07-04 DIAGNOSIS — Z96.652 PRESENCE OF LEFT ARTIFICIAL KNEE JOINT: Chronic | ICD-10-CM

## 2022-07-04 DIAGNOSIS — Z98.89 OTHER SPECIFIED POSTPROCEDURAL STATES: Chronic | ICD-10-CM

## 2022-07-04 PROCEDURE — 99282 EMERGENCY DEPT VISIT SF MDM: CPT

## 2022-07-04 PROCEDURE — 99283 EMERGENCY DEPT VISIT LOW MDM: CPT

## 2022-07-04 RX ORDER — LIDOCAINE 4 G/100G
1 CREAM TOPICAL ONCE
Refills: 0 | Status: COMPLETED | OUTPATIENT
Start: 2022-07-04 | End: 2022-07-04

## 2022-07-04 RX ORDER — ACETAMINOPHEN 500 MG
975 TABLET ORAL ONCE
Refills: 0 | Status: COMPLETED | OUTPATIENT
Start: 2022-07-04 | End: 2022-07-04

## 2022-07-04 RX ORDER — LIDOCAINE 4 G/100G
1 CREAM TOPICAL
Qty: 5 | Refills: 0
Start: 2022-07-04 | End: 2022-07-08

## 2022-07-04 RX ADMIN — LIDOCAINE 1 PATCH: 4 CREAM TOPICAL at 08:11

## 2022-07-04 RX ADMIN — Medication 975 MILLIGRAM(S): at 08:10

## 2022-07-04 NOTE — ED ADULT TRIAGE NOTE - CHIEF COMPLAINT QUOTE
Pt presents to ED with c/o neck pain x several weeks, worse in the past few days.  Denies any trauma, fall.

## 2022-07-04 NOTE — ED PROVIDER NOTE - CARE PROVIDER_API CALL
Elan Dean)  Orthopaedic Surgery  [address], [address]  [place], [place] [id number]  Phone: [phone]  Fax: [phone]  Follow Up Time:

## 2022-07-04 NOTE — ED PROVIDER NOTE - PATIENT PORTAL LINK FT
You can access the FollowMyHealth Patient Portal offered by [facility] by registering at the following website: [email or web address]. By joining [facility]’s FollowMyHealth portal, you will also be able to view your health information using other applications (apps) compatible with our system.

## 2022-07-04 NOTE — ED PROVIDER NOTE - OBJECTIVE STATEMENT
Patient is a 66 yo male with hx of recent left mca infarct with aphasia with improvement in symptoms; patient reports upper neck pain x weeks; worsening in last few days; also with right knee pain; seen by  orthopedist- placed on po steroids with improvement in upper neck pain and right knee pain but neck pain worsened after patient recently finished oral steroids; no weakness in arms or legs; no headache; no blurred vision or double vison; no incontinence o of bowel or bladder function; ambulatory with walker which patient uses at baseline; has not taken any medication for pain; no trauma or injury.

## 2022-07-04 NOTE — ED PROVIDER NOTE - MUSCULOSKELETAL, MLM
Spine appears normal, range of motion is not limited, (+) paravertebral cervical tenderness; no midline tenderness; strength/sensation intact; ambulatory in ED with walker;

## 2022-07-04 NOTE — ED PROVIDER NOTE - NSFOLLOWUPINSTRUCTIONS_ED_ALL_ED_FT
Cervical Strain    WHAT YOU NEED TO KNOW:    A cervical strain is a stretched or torn muscle or tendon in your neck. Tendons are strong tissues that connect muscles to bones.    DISCHARGE INSTRUCTIONS:    Seek care immediately if:     You have pain or numbness from your shoulder down to your hand.      You have problems with your vision, hearing, or balance.      You feel confused or cannot concentrate.      You have problems with movement and strength.    Call your doctor if:     You have increased swelling or pain in your neck.       You have questions or concerns about your condition or care.    Medicines: You may need any of the following:     Acetaminophen decreases pain and fever. It is available without a doctor's order. Ask how much to take and how often to take it. Follow directions. Read the labels of all other medicines you are using to see if they also contain acetaminophen, or ask your doctor or pharmacist. Acetaminophen can cause liver damage if not taken correctly. Do not use more than 4 grams (4,000 milligrams) total of acetaminophen in one day.       NSAIDs, such as ibuprofen, help decrease swelling, pain, and fever. This medicine is available with or without a doctor's order. NSAIDs can cause stomach bleeding or kidney problems in certain people. If you take blood thinner medicine, always ask your healthcare provider if NSAIDs are safe for you. Always read the medicine label and follow directions.      Muscle relaxers help decrease pain and muscle spasms.      Prescription pain medicine may be given. Ask your healthcare provider how to take this medicine safely. Some prescription pain medicines contain acetaminophen. Do not take other medicines that contain acetaminophen without talking to your healthcare provider. Too much acetaminophen may cause liver damage. Prescription pain medicine may cause constipation. Ask your healthcare provider how to prevent or treat constipation.       Take your medicine as directed. Contact your healthcare provider if you think your medicine is not helping or if you have side effects. Tell him or her if you are allergic to any medicine. Keep a list of the medicines, vitamins, and herbs you take. Include the amounts, and when and why you take them. Bring the list or the pill bottles to follow-up visits. Carry your medicine list with you in case of an emergency.    Manage your symptoms:     Apply heat on your neck for 15 to 20 minutes, 4 to 6 times a day or as directed. Heat helps decrease pain, stiffness, and muscle spasms.      Begin gentle neck exercises as soon as you can move your neck without pain. Exercises will help decrease stiffness and improve the strength and movement of your neck. Ask your healthcare provider what kind of exercises you should do.      Gradually return to your usual activities as directed. Stop if you have pain. Avoid activities that can cause more damage to your neck, such as heavy lifting or strenuous exercise.      Sleep without a pillow to help decrease pain. Instead, roll a small towel tightly and place it under your neck.       Go to physical therapy as directed. A physical therapist teaches you exercises to help improve movement and strength, and to decrease pain.     Prevent another neck injury:     Drive safely. Make sure everyone in your car wears a seatbelt. A seatbelt can save your life if you are in an accident. Do not use your cell phone when you are driving. This could distract you and cause an accident. Pull over if you need to make a call or send a text message.       Wear helmets, lifejackets, and protective gear. Always wear a helmet when you ride a bike or motorcycle, go skiing, or play sports that could cause a head injury. Wear protective equipment when you play sports. Wear a lifejacket when you are on a boat or doing water sports.     Follow up with your doctor as directed: You may be referred to an orthopedist or physical therapies. Write down your questions so you remember to ask them during your visits.

## 2022-07-04 NOTE — ED PROVIDER NOTE - PROGRESS NOTE DETAILS
Francisca PAIZ: family comfortable with d/c plan at this time; all questions answered; strict return precautions given.

## 2022-07-04 NOTE — ED PROVIDER NOTE - CLINICAL SUMMARY MEDICAL DECISION MAKING FREE TEXT BOX
68 yo male with upper neck pain; paravertebral; reproducible on examination; no midline tenderness; ambulatory in ED with steady gait with walker; strength/sensation intact; no red flags; rec tylenol prn pain (correct dosage d/w patient and family at bedside); lidoderm patch rx given; warm compresses; f/u with Dr. Oli caceres tomorrow as instructed; strict return precautions given.

## 2022-07-08 NOTE — CHART NOTE - NSCHARTNOTEFT_GEN_A_CORE
SW placed call to patient to discuss and assist with follow up care.  Patient presented to ED on 7/4/22 for neck pain.  Patient did not answer follow up call.

## 2022-07-26 ENCOUNTER — NON-APPOINTMENT (OUTPATIENT)
Age: 67
End: 2022-07-26

## 2022-08-02 ENCOUNTER — APPOINTMENT (OUTPATIENT)
Dept: FAMILY MEDICINE | Facility: CLINIC | Age: 67
End: 2022-08-02

## 2022-08-02 VITALS
HEART RATE: 76 BPM | DIASTOLIC BLOOD PRESSURE: 65 MMHG | HEIGHT: 72 IN | WEIGHT: 175 LBS | RESPIRATION RATE: 20 BRPM | SYSTOLIC BLOOD PRESSURE: 110 MMHG | BODY MASS INDEX: 23.7 KG/M2

## 2022-08-02 DIAGNOSIS — Z00.00 ENCOUNTER FOR GENERAL ADULT MEDICAL EXAMINATION W/OUT ABNORMAL FINDINGS: ICD-10-CM

## 2022-08-02 PROCEDURE — 99205 OFFICE O/P NEW HI 60 MIN: CPT | Mod: 25

## 2022-08-02 PROCEDURE — 36415 COLL VENOUS BLD VENIPUNCTURE: CPT

## 2022-08-02 RX ORDER — ASPIRIN 81 MG/1
81 TABLET, CHEWABLE ORAL
Qty: 90 | Refills: 0 | Status: COMPLETED | COMMUNITY
Start: 2022-06-02

## 2022-08-02 RX ORDER — METHYLPREDNISOLONE 4 MG/1
4 TABLET ORAL
Qty: 21 | Refills: 0 | Status: COMPLETED | COMMUNITY
Start: 2022-06-22

## 2022-08-02 NOTE — PHYSICAL EXAM
[Ill-Appearing] : ill-appearing [Tenderness] : was tender [Diffuse] : posterior [No Carotid Bruits] : no carotid bruits [No Abdominal Bruit] : a ~M bruit was not heard ~T in the abdomen [No Varicosities] : no varicosities [No Edema] : there was no peripheral edema [No Palpable Aorta] : no palpable aorta [Normal] : soft, non-tender, non-distended, no masses palpated, no HSM and normal bowel sounds [Normal Posterior Cervical Nodes] : no posterior cervical lymphadenopathy [Normal Anterior Cervical Nodes] : no anterior cervical lymphadenopathy [Muscle Spasms, Bilateral] : bilateral muscle spasms [Motor Strength Upper Extremities Bilaterally] : there was weakness in both upper extremities [Motor Strength Lower Extremities Bilaterally] : there was weakness in both lower extremities [No Skin Lesions] : no skin lesions [Speech Grossly Normal] : speech grossly normal [Memory Grossly Normal] : memory grossly normal [Normal Affect] : the affect was normal [Alert and Oriented x3] : oriented to person, place, and time [Normal Mood] : the mood was normal [Normal Insight/Judgement] : insight and judgment were intact [de-identified] : general joint deformities, with marked decreased ROM R knee; using rolling walker for ambulation [de-identified] : general weakness with no definitive focal deficits

## 2022-08-02 NOTE — COUNSELING
[Fall prevention counseling provided] : Fall prevention counseling provided [Use recommended devices] : Use recommended devices [de-identified] : Healthy eating and activities [None] : None [Good understanding] : Patient has a good understanding of lifestyle changes and steps needed to achieve self management goal

## 2022-08-02 NOTE — HISTORY OF PRESENT ILLNESS
[FreeTextEntry1] : Requests initial visit [de-identified] : Presents for initial visit to establish this office as PCP.  Reviewed history and medication and renewed as requested.  Most pressing complaint at present is generalized joint pain, stiffness, especially neck - "I can't look up or down."  Note pt has stopped the statin as he felt it caused the joint issues.  Note recent Neuro and Cardiology visits.

## 2022-08-02 NOTE — REVIEW OF SYSTEMS
[Fatigue] : fatigue [Joint Pain] : joint pain [Joint Stiffness] : joint stiffness [Muscle Weakness] : muscle weakness [Back Pain] : back pain [Joint Swelling] : joint swelling [Unsteady Walk] : ataxia [Negative] : Heme/Lymph

## 2022-08-02 NOTE — HEALTH RISK ASSESSMENT
[Never] : Never [Yes] : Yes [Monthly or less (1 pt)] : Monthly or less (1 point) [1 or 2 (0 pts)] : 1 or 2 (0 points) [Never (0 pts)] : Never (0 points) [No] : In the past 12 months have you used drugs other than those required for medical reasons? No [No falls in past year] : Patient reported no falls in the past year [0] : 2) Feeling down, depressed, or hopeless: Not at all (0) [Independent] : using telephone [Some assistance needed] : managing finances [With Patient/Caregiver] : , with patient/caregiver [Audit-CScore] : 1 [AdvancecareDate] : 08/22

## 2022-08-02 NOTE — ASSESSMENT
[FreeTextEntry1] : Initial exam reveals patient to be hemodynamically stable with acceptable BP\par General weakness, but no focal findings appreciated\par Generalized arthropathy, most notably R knee - will include inflammatory markers with today's labs; consider Rheum evaluation\par Lab profiles drawn in office and sent

## 2022-08-03 LAB
ALBUMIN SERPL ELPH-MCNC: 4 G/DL
ALP BLD-CCNC: 308 U/L
ALT SERPL-CCNC: 21 U/L
ANION GAP SERPL CALC-SCNC: 16 MMOL/L
AST SERPL-CCNC: 17 U/L
BASOPHILS # BLD AUTO: 0.1 K/UL
BASOPHILS NFR BLD AUTO: 1 %
BILIRUB SERPL-MCNC: 0.8 MG/DL
BUN SERPL-MCNC: 8 MG/DL
CALCIUM SERPL-MCNC: 9.8 MG/DL
CHLORIDE SERPL-SCNC: 101 MMOL/L
CHOLEST SERPL-MCNC: 155 MG/DL
CK SERPL-CCNC: 55 U/L
CO2 SERPL-SCNC: 22 MMOL/L
CREAT SERPL-MCNC: 0.92 MG/DL
CRP SERPL-MCNC: 237 MG/L
EGFR: 90 ML/MIN/1.73M2
EOSINOPHIL # BLD AUTO: 0.04 K/UL
EOSINOPHIL NFR BLD AUTO: 0.4 %
ERYTHROCYTE [SEDIMENTATION RATE] IN BLOOD BY WESTERGREN METHOD: 92 MM/HR
ESTIMATED AVERAGE GLUCOSE: 114 MG/DL
FOLATE SERPL-MCNC: 4.5 NG/ML
GLUCOSE SERPL-MCNC: 125 MG/DL
HBA1C MFR BLD HPLC: 5.6 %
HCT VFR BLD CALC: 40.9 %
HDLC SERPL-MCNC: 54 MG/DL
HGB BLD-MCNC: 13.6 G/DL
IMM GRANULOCYTES NFR BLD AUTO: 0.4 %
LDLC SERPL CALC-MCNC: 79 MG/DL
LYMPHOCYTES # BLD AUTO: 1.44 K/UL
LYMPHOCYTES NFR BLD AUTO: 13.9 %
MAN DIFF?: NORMAL
MCHC RBC-ENTMCNC: 29.1 PG
MCHC RBC-ENTMCNC: 33.3 GM/DL
MCV RBC AUTO: 87.6 FL
MONOCYTES # BLD AUTO: 1.13 K/UL
MONOCYTES NFR BLD AUTO: 10.9 %
NEUTROPHILS # BLD AUTO: 7.64 K/UL
NEUTROPHILS NFR BLD AUTO: 73.4 %
NONHDLC SERPL-MCNC: 101 MG/DL
PLATELET # BLD AUTO: 335 K/UL
POTASSIUM SERPL-SCNC: 3.9 MMOL/L
PROT SERPL-MCNC: 6.9 G/DL
PSA SERPL-MCNC: 1.65 NG/ML
RBC # BLD: 4.67 M/UL
RBC # FLD: 13.9 %
SODIUM SERPL-SCNC: 138 MMOL/L
T4 FREE SERPL-MCNC: 1.2 NG/DL
TRIGL SERPL-MCNC: 108 MG/DL
TSH SERPL-ACNC: 2.14 UIU/ML
VIT B12 SERPL-MCNC: 631 PG/ML
WBC # FLD AUTO: 10.39 K/UL

## 2022-08-04 ENCOUNTER — APPOINTMENT (OUTPATIENT)
Dept: CT IMAGING | Facility: CLINIC | Age: 67
End: 2022-08-04

## 2022-08-05 ENCOUNTER — NON-APPOINTMENT (OUTPATIENT)
Age: 67
End: 2022-08-05

## 2022-08-05 ENCOUNTER — APPOINTMENT (OUTPATIENT)
Dept: RHEUMATOLOGY | Facility: CLINIC | Age: 67
End: 2022-08-05

## 2022-08-05 ENCOUNTER — RESULT REVIEW (OUTPATIENT)
Age: 67
End: 2022-08-05

## 2022-08-05 VITALS
RESPIRATION RATE: 16 BRPM | HEIGHT: 72 IN | HEART RATE: 94 BPM | OXYGEN SATURATION: 98 % | DIASTOLIC BLOOD PRESSURE: 70 MMHG | WEIGHT: 174 LBS | TEMPERATURE: 97.8 F | SYSTOLIC BLOOD PRESSURE: 130 MMHG | BODY MASS INDEX: 23.57 KG/M2

## 2022-08-05 PROCEDURE — 99204 OFFICE O/P NEW MOD 45 MIN: CPT

## 2022-08-08 NOTE — REVIEW OF SYSTEMS
[Negative] : Heme/Lymph [Feeling Poorly] : feeling poorly [Arthralgias] : arthralgias [Joint Pain] : joint pain [Joint Swelling] : no joint swelling [Joint Stiffness] : joint stiffness [As Noted in HPI] : as noted in HPI

## 2022-08-08 NOTE — ASSESSMENT
[FreeTextEntry1] : CAROLINE ROBERTO is a 67 year old man who presents with below --\par \par # High ESR, persistently markedly elevated CRP since last year in setting of progressive diffuse arthralgias with R knee synovitis on exam today and loss of C spine ROM.\par - check labs as below to eval for inflammatory vs infectious etiologies\par - markedly debilitated at present and did improve wih recent trial of steroids - will resume low dose 10mg/day. Risks and benefits of steroids were d/w patient including but not limited to weight gain, diabetes, HTN, avascular necrosis, osteoporosis, glaucoma, cataract, infections and immunosuppression.\par - XR C spine\par - MRI R knee \par \par #  Also with AlkP markedly elevated but normal LFTs, per review of other notes he has significant daily ETOH intake \par - would trend in 1 month\par - ddx of Pagets  of bone can be seen with high AlkP, will await imaging to see if suggestive, tho imaging during CVA without any mention of skull or C spine involvement tho will ask radiology to review images \par \par RTC in 1 month \par

## 2022-08-08 NOTE — HISTORY OF PRESENT ILLNESS
[FreeTextEntry1] : CAROLINE ROBERTO is a 67 year old man who presents with + ESR 92,  and 1 month of worsening arthralgias and limited ROM - C spine with spasm and marked limitation in ROM, R knee/ankle/foot pain pain that is making ambulation very difficult, some initial swelling but this resolved with steroid trial and has not recurred. Milder pain in both at baseline but never this severe. No prior episodes of similar but has had chronic R knee pain and s/p MRI last year with  OA changes, effusion, some synovitis - s/p Durolane injection with improvement, CRP was also markedly elevated at that time. He has had poor f/u with non- orthopedists since, only just established with PMD, prior was getting urgent care only. \par \par Recent CVA and slowly improving sx but reports he was doing well directly after for 2 weeks prior to this onset. Thought perhaps the statin and held it for 2 days without improvement. \par \par Inflammatory arthritis ROS negative for symmetrical peripheral joint synovitis, prolonged AM stiffness, enthesitis, dactylitis, psoriasis/ rashes, eye inflammation, inflammatory low back pain, IBD. \par \par Denies new/worsening HA, visual changes, scalp tenderness, jaw claudication, F/C, night sweats, unintentional weight loss, limb weakness or paresthesias. \par \par Labs - AlkP 308, as above \par Negative - CPK

## 2022-08-08 NOTE — PHYSICAL EXAM
[General Appearance - Alert] : alert [General Appearance - In No Acute Distress] : in no acute distress [General Appearance - Well Nourished] : well nourished [Sclera] : the sclera and conjunctiva were normal [PERRL With Normal Accommodation] : pupils were equal in size, round, and reactive to light [Extraocular Movements] : extraocular movements were intact [Outer Ear] : the ears and nose were normal in appearance [Oropharynx] : the oropharynx was normal [Neck Appearance] : the appearance of the neck was normal [Neck Cervical Mass (___cm)] : no neck mass was observed [Thyroid Diffuse Enlargement] : the thyroid was not enlarged [Auscultation Breath Sounds / Voice Sounds] : lungs were clear to auscultation bilaterally [Heart Rate And Rhythm] : heart rate was normal and rhythm regular [Heart Sounds] : normal S1 and S2 [Murmurs] : no murmurs [Edema] : there was no peripheral edema [Bowel Sounds] : normal bowel sounds [Abdomen Soft] : soft [Abdomen Tenderness] : non-tender [Cervical Lymph Nodes Enlarged Posterior Bilaterally] : posterior cervical [Cervical Lymph Nodes Enlarged Anterior Bilaterally] : anterior cervical [Supraclavicular Lymph Nodes Enlarged Bilaterally] : supraclavicular [No CVA Tenderness] : no ~M costovertebral angle tenderness [No Spinal Tenderness] : no spinal tenderness [Abnormal Walk] : normal gait [Nail Clubbing] : no clubbing  or cyanosis of the fingernails [Musculoskeletal - Swelling] : no joint swelling seen [Motor Tone] : muscle strength and tone were normal [] : no rash [FreeTextEntry1] : RLE proximal 4/5, remainder of strength exam 5/5, no muscle induration  [Oriented To Time, Place, And Person] : oriented to person, place, and time [Impaired Insight] : insight and judgment were intact [Affect] : the affect was normal

## 2022-08-08 NOTE — DATA REVIEWED
[FreeTextEntry1] : Available notes, and pertinent labs & imaging in EMR reviewed.  Pertinent labs and imaging summarized in HPI.

## 2022-08-09 ENCOUNTER — APPOINTMENT (OUTPATIENT)
Dept: MRI IMAGING | Facility: HOSPITAL | Age: 67
End: 2022-08-09

## 2022-08-09 ENCOUNTER — RESULT REVIEW (OUTPATIENT)
Age: 67
End: 2022-08-09

## 2022-08-09 ENCOUNTER — OUTPATIENT (OUTPATIENT)
Dept: OUTPATIENT SERVICES | Facility: HOSPITAL | Age: 67
LOS: 1 days | End: 2022-08-09
Payer: MEDICARE

## 2022-08-09 DIAGNOSIS — Z96.652 PRESENCE OF LEFT ARTIFICIAL KNEE JOINT: Chronic | ICD-10-CM

## 2022-08-09 DIAGNOSIS — Z98.89 OTHER SPECIFIED POSTPROCEDURAL STATES: Chronic | ICD-10-CM

## 2022-08-09 DIAGNOSIS — I65.22 OCCLUSION AND STENOSIS OF LEFT CAROTID ARTERY: ICD-10-CM

## 2022-08-09 PROCEDURE — 70551 MRI BRAIN STEM W/O DYE: CPT

## 2022-08-09 PROCEDURE — 70551 MRI BRAIN STEM W/O DYE: CPT | Mod: 26

## 2022-08-09 PROCEDURE — 70544 MR ANGIOGRAPHY HEAD W/O DYE: CPT

## 2022-08-11 ENCOUNTER — NON-APPOINTMENT (OUTPATIENT)
Age: 67
End: 2022-08-11

## 2022-08-15 ENCOUNTER — OUTPATIENT (OUTPATIENT)
Dept: OUTPATIENT SERVICES | Facility: HOSPITAL | Age: 67
LOS: 1 days | End: 2022-08-15
Payer: MEDICARE

## 2022-08-15 ENCOUNTER — LABORATORY RESULT (OUTPATIENT)
Age: 67
End: 2022-08-15

## 2022-08-15 ENCOUNTER — APPOINTMENT (OUTPATIENT)
Dept: RADIOLOGY | Facility: HOSPITAL | Age: 67
End: 2022-08-15

## 2022-08-15 DIAGNOSIS — Z96.652 PRESENCE OF LEFT ARTIFICIAL KNEE JOINT: Chronic | ICD-10-CM

## 2022-08-15 DIAGNOSIS — Z98.89 OTHER SPECIFIED POSTPROCEDURAL STATES: Chronic | ICD-10-CM

## 2022-08-15 DIAGNOSIS — M53.82 OTHER SPECIFIED DORSOPATHIES, CERVICAL REGION: ICD-10-CM

## 2022-08-15 LAB
FERRITIN SERPL-MCNC: 867 NG/ML
IRON SATN MFR SERPL: 30 %
IRON SERPL-MCNC: 47 UG/DL
RHEUMATOID FACT SER QL: 12 IU/ML
TIBC SERPL-MCNC: 154 UG/DL
UIBC SERPL-MCNC: 107 UG/DL

## 2022-08-15 PROCEDURE — 72052 X-RAY EXAM NECK SPINE 6/>VWS: CPT | Mod: 26

## 2022-08-15 PROCEDURE — 72052 X-RAY EXAM NECK SPINE 6/>VWS: CPT

## 2022-08-16 LAB
ACE BLD-CCNC: 16 U/L
HBV CORE IGG+IGM SER QL: NONREACTIVE
HBV SURFACE AB SER QL: NONREACTIVE
HBV SURFACE AG SER QL: NONREACTIVE
HCV AB SER QL: NONREACTIVE
HCV S/CO RATIO: 0.09 S/CO

## 2022-08-17 LAB
CCP AB SER IA-ACNC: <8 UNITS
RF+CCP IGG SER-IMP: NEGATIVE

## 2022-08-18 LAB
A PHAGOCYTOPH IGG TITR SER IF: NORMAL TITER
B BURGDOR AB SER QL IA: NEGATIVE
B MICROTI IGG TITR SER: NORMAL TITER
E CHAFFEENSIS IGG TITR SER IF: NORMAL TITER
M TB IFN-G BLD-IMP: NEGATIVE
QUANTIFERON TB PLUS MITOGEN MINUS NIL: >10 IU/ML
QUANTIFERON TB PLUS NIL: 0.02 IU/ML
QUANTIFERON TB PLUS TB1 MINUS NIL: 0 IU/ML
QUANTIFERON TB PLUS TB2 MINUS NIL: 0 IU/ML

## 2022-08-19 ENCOUNTER — APPOINTMENT (OUTPATIENT)
Dept: MRI IMAGING | Facility: HOSPITAL | Age: 67
End: 2022-08-19

## 2022-08-23 LAB — HLA-B27 RELATED AG QL: NEGATIVE

## 2022-09-06 ENCOUNTER — NON-APPOINTMENT (OUTPATIENT)
Age: 67
End: 2022-09-06

## 2022-09-08 ENCOUNTER — APPOINTMENT (OUTPATIENT)
Dept: RHEUMATOLOGY | Facility: CLINIC | Age: 67
End: 2022-09-08

## 2022-09-08 ENCOUNTER — APPOINTMENT (OUTPATIENT)
Dept: FAMILY MEDICINE | Facility: CLINIC | Age: 67
End: 2022-09-08

## 2022-09-08 VITALS
BODY MASS INDEX: 24.92 KG/M2 | HEART RATE: 71 BPM | WEIGHT: 184 LBS | DIASTOLIC BLOOD PRESSURE: 70 MMHG | SYSTOLIC BLOOD PRESSURE: 134 MMHG | RESPIRATION RATE: 16 BRPM | TEMPERATURE: 97.8 F | OXYGEN SATURATION: 98 % | HEIGHT: 72 IN

## 2022-09-08 VITALS
BODY MASS INDEX: 24.92 KG/M2 | HEIGHT: 72 IN | DIASTOLIC BLOOD PRESSURE: 68 MMHG | RESPIRATION RATE: 20 BRPM | HEART RATE: 76 BPM | WEIGHT: 184 LBS | SYSTOLIC BLOOD PRESSURE: 128 MMHG

## 2022-09-08 DIAGNOSIS — R74.8 ABNORMAL LEVELS OF OTHER SERUM ENZYMES: ICD-10-CM

## 2022-09-08 DIAGNOSIS — M53.82 OTHER SPECIFIED DORSOPATHIES, CERVICAL REGION: ICD-10-CM

## 2022-09-08 DIAGNOSIS — R70.0 ELEVATED ERYTHROCYTE SEDIMENTATION RATE: ICD-10-CM

## 2022-09-08 DIAGNOSIS — R79.82 ELEVATED C-REACTIVE PROTEIN (CRP): ICD-10-CM

## 2022-09-08 DIAGNOSIS — M54.2 CERVICALGIA: ICD-10-CM

## 2022-09-08 DIAGNOSIS — M65.9 SYNOVITIS AND TENOSYNOVITIS, UNSPECIFIED: ICD-10-CM

## 2022-09-08 PROCEDURE — 99213 OFFICE O/P EST LOW 20 MIN: CPT

## 2022-09-08 PROCEDURE — 99214 OFFICE O/P EST MOD 30 MIN: CPT | Mod: 25

## 2022-09-08 PROCEDURE — 36415 COLL VENOUS BLD VENIPUNCTURE: CPT

## 2022-09-08 NOTE — ASSESSMENT
[FreeTextEntry1] : CAROLINE ROBERTO is a 67 year old man who presents with below --\par \par # High ESR, persistently markedly elevated CRP since last year in setting of progressive diffuse arthralgias with R knee synovitis and loss of C spine ROM -- now with dramatic improvement without steroids in last month, tho R knee remains with some chronic changes \par -  reviewed labs and imaging in detail with patient, all questions answered -- w/u negative for inflammatory or infectious etiology of arthritis, ?viral process \par - given infrequent prednisone use, can d/c without taper. Advised if recurrence of sx to promptly resume 10mg/day and call office for urgent visit. \par - MRI R knee appeal pending, LOMN written -- would still like to see interval change from last years MRI as ongoing limited ROM \par \par #  AlkP markedly elevated but normal LFTs, per review of other notes he has significant daily ETOH intake \par - would trend - PMD to draw\par - ddx of Pagets  of bone can be seen with high AlkP, no suggestion on C spine imaging and imaging during CVA without any mention of skull or C spine involvement \par \par RTC prn if recurrence of sx \par

## 2022-09-08 NOTE — PHYSICAL EXAM
[No Acute Distress] : no acute distress [Normal] : normal rate, regular rhythm, normal S1 and S2 and no murmur heard [No Edema] : there was no peripheral edema [Soft] : abdomen soft [Non Tender] : non-tender [Normal Posterior Cervical Nodes] : no posterior cervical lymphadenopathy [Normal Anterior Cervical Nodes] : no anterior cervical lymphadenopathy [Coordination Grossly Intact] : coordination grossly intact [No Focal Deficits] : no focal deficits [Normal Gait] : normal gait [Alert and Oriented x3] : oriented to person, place, and time [de-identified] : marked general improvement in ROM

## 2022-09-08 NOTE — REVIEW OF SYSTEMS
[Arthralgias] : arthralgias [Joint Swelling] : no joint swelling [Joint Stiffness] : no joint stiffness [As Noted in HPI] : as noted in HPI [Negative] : Constitutional

## 2022-09-08 NOTE — ASSESSMENT
[FreeTextEntry1] : Hemodynamically stable with acceptable BP\par Neuro exam stable\par Marked improvement in all ROM\par Lab profiles drawn in office and sent

## 2022-09-08 NOTE — HISTORY OF PRESENT ILLNESS
[de-identified] : Presents for BP check and general follow-up.  States feeling improved; able to move more easily; also eating better - note very desirable wt gain.

## 2022-09-08 NOTE — HISTORY OF PRESENT ILLNESS
[FreeTextEntry1] : CAROLINE ROBERTO is a 67 year old man who presents with + ESR 92,  and 1 month of worsening arthralgias and limited ROM - C spine with spasm and marked limitation in ROM, R knee/ankle/foot pain pain that is making ambulation very difficult, some initial swelling but this resolved with steroid trial and has not recurred. Milder pain in both at baseline but never this severe. No prior episodes of similar but has had chronic R knee pain and s/p MRI last year with  OA changes, effusion, some synovitis - s/p Durolane injection with improvement, CRP was also markedly elevated at that time. He has had poor f/u with non- orthopedists since, only just established with PMD, prior was getting urgent care only. \par \par Recent CVA and slowly improving sx but reports he was doing well directly after for 2 weeks prior to this onset. Thought perhaps the statin and held it for 2 days without improvement. \par \par Inflammatory arthritis ROS negative for symmetrical peripheral joint synovitis, prolonged AM stiffness, enthesitis, dactylitis, psoriasis/ rashes, eye inflammation, inflammatory low back pain, IBD. \par \par Denies new/worsening HA, visual changes, scalp tenderness, jaw claudication, F/C, night sweats, unintentional weight loss, limb weakness or paresthesias. \par \par Labs - AlkP 308, as above, high ferritin with normal iron \par Negative - CPK, RF/CCP, Lyme/tick borne, HLA B27, ACE, Hep B/C\par C spine XR - no inflammatory changes \par \par ---------\par 9/8/22 -- Clinically self improved since last visit as only using prednisone every few days and moreso as he remembers to take them and not 2/2 recurrence of pain. C spine ROM improved, can arise from seated without aide. Ongoing OA related arthralgias which he has had chronically.

## 2022-09-08 NOTE — PHYSICAL EXAM
[General Appearance - Alert] : alert [General Appearance - In No Acute Distress] : in no acute distress [General Appearance - Well Nourished] : well nourished [Sclera] : the sclera and conjunctiva were normal [PERRL With Normal Accommodation] : pupils were equal in size, round, and reactive to light [Extraocular Movements] : extraocular movements were intact [Outer Ear] : the ears and nose were normal in appearance [Oropharynx] : the oropharynx was normal [Neck Appearance] : the appearance of the neck was normal [Auscultation Breath Sounds / Voice Sounds] : lungs were clear to auscultation bilaterally [Heart Rate And Rhythm] : heart rate was normal and rhythm regular [Heart Sounds] : normal S1 and S2 [Murmurs] : no murmurs [Edema] : there was no peripheral edema [Bowel Sounds] : normal bowel sounds [Abdomen Soft] : soft [Abdomen Tenderness] : non-tender [Cervical Lymph Nodes Enlarged Posterior Bilaterally] : posterior cervical [Cervical Lymph Nodes Enlarged Anterior Bilaterally] : anterior cervical [Supraclavicular Lymph Nodes Enlarged Bilaterally] : supraclavicular [No CVA Tenderness] : no ~M costovertebral angle tenderness [No Spinal Tenderness] : no spinal tenderness [Abnormal Walk] : normal gait [Nail Clubbing] : no clubbing  or cyanosis of the fingernails [Musculoskeletal - Swelling] : no joint swelling seen [Motor Tone] : muscle strength and tone were normal [Oriented To Time, Place, And Person] : oriented to person, place, and time [Impaired Insight] : insight and judgment were intact [Affect] : the affect was normal [] : the neck was supple [FreeTextEntry1] : RLE proximal 4/5, remainder of strength exam 5/5, no muscle induration

## 2022-09-15 ENCOUNTER — APPOINTMENT (OUTPATIENT)
Dept: CARDIOLOGY | Facility: CLINIC | Age: 67
End: 2022-09-15

## 2022-09-15 LAB
ALBUMIN SERPL ELPH-MCNC: 4.3 G/DL
ALP BLD-CCNC: 158 IU/L
ALP BLD-CCNC: 158 U/L
ALP BONE CFR SERPL: 24 %
ALP INTEST CFR SERPL: 5 %
ALP LIVER CFR SERPL: 71 %
ALT SERPL-CCNC: 51 U/L
ANION GAP SERPL CALC-SCNC: 14 MMOL/L
AST SERPL-CCNC: 23 U/L
BILIRUB SERPL-MCNC: 0.3 MG/DL
BUN SERPL-MCNC: 14 MG/DL
CALCIUM SERPL-MCNC: 9.7 MG/DL
CHLORIDE SERPL-SCNC: 104 MMOL/L
CO2 SERPL-SCNC: 23 MMOL/L
CREAT SERPL-MCNC: 0.89 MG/DL
EGFR: 93 ML/MIN/1.73M2
GLUCOSE SERPL-MCNC: 163 MG/DL
POTASSIUM SERPL-SCNC: 4 MMOL/L
PROT SERPL-MCNC: 6.6 G/DL
SODIUM SERPL-SCNC: 141 MMOL/L

## 2022-09-15 PROCEDURE — A9500: CPT

## 2022-09-15 PROCEDURE — 78452 HT MUSCLE IMAGE SPECT MULT: CPT

## 2022-09-21 ENCOUNTER — APPOINTMENT (OUTPATIENT)
Dept: NEUROSURGERY | Facility: CLINIC | Age: 67
End: 2022-09-21

## 2022-09-21 VITALS
SYSTOLIC BLOOD PRESSURE: 168 MMHG | HEIGHT: 72 IN | BODY MASS INDEX: 24.92 KG/M2 | HEART RATE: 64 BPM | DIASTOLIC BLOOD PRESSURE: 90 MMHG | OXYGEN SATURATION: 98 % | WEIGHT: 184 LBS

## 2022-09-21 PROCEDURE — 99213 OFFICE O/P EST LOW 20 MIN: CPT

## 2022-09-21 NOTE — REASON FOR VISIT
[Follow-Up: _____] : a [unfilled] follow-up visit [Family Member] : family member [FreeTextEntry1] : Reviewed results of approximately 3 month follow up MRI brain non con, MRA brain non con NOVA done on 8/9/22\par \par s/p cerebral angiogram and intracranial stenting of the stenotic left supraclinoid ICA using a 2.25 x 8 mm RESOLUTE drug eluting stent 5/26/22\par \par s/p diagnostic cerebral angiogram 5/23/22

## 2022-09-21 NOTE — ASSESSMENT
[FreeTextEntry1] : IMPRESSION:\par 67M with PMH of macular degeneration, EtOH use, recent trip to [place], p/w expressive aphasia, found to have L MCA infarct, ischemic stroke in the setting of L supraclinoid ICA severe stenosis s/p dx angio 5/23 demonstrating diffuse intracranial atherosclerotic disease, most severe within L ICA supraclinoid segment where there is a focal point of near complete occlusion just proximal to the ICA terminus resulting in significant flow limitation. 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 Exam worse 5/26 with right hemiparesis, s/p successful left supraclinoid ICA drug eluting stent with improvement in flow throughout left MCA territory but residual moderate to severe narrowing within the proximal M1 segment of the left MCA. Discharged on ASA 81 and Brilinta 60mg BID.\par \par He is doing clinically well. Reports continued improvement in speech. Denies weakness on one side of the body, stroke-like symptoms, and other symptoms of motor, sensory, speech, or visual abnormalities. Denies issues with bleeding while compliant with ASA and Brilinta. \par \par His post-stenting NOVA showed some residual limitation in flow in the left MCA, though improved from prior. We discussed the risks vs benefits of placing another stent within the residual narrowing in the M1. Since he is clinically doing much better, at this time the risks of additional stent outweigh the benefits, particularly in the M1 where there are lenticulostriate perforators. If the flow in the left MCA continues to decrease on subsequent NOVA or he has new symptoms, we can discuss additional stenting. \par \par 3 month MRI/MRA NOVA 8/9 shows stent in the left supraclinoid internal carotid artery with diminished flow in the left middle cerebral artery branches, similar to the prior exam. Less flow in the left internal carotid artery is identified compared with the prior exam.\par \par  \par \par PLAN:\par Continue ASA and Brilinta\par Continue following up with stroke neurologist, Dr. Morales\par Recommend conservative management with serial MRIs, as risks of additional stent outweigh benefits given his significant improvement neurologically and current lack of symptoms\par Repeat MRI brain non con, MRA brain non con NOVA  in 2 months - 6 months post-stenting (November 2022)\par Follow up in office after to discuss results

## 2022-09-28 ENCOUNTER — RX RENEWAL (OUTPATIENT)
Age: 67
End: 2022-09-28

## 2022-09-28 ENCOUNTER — APPOINTMENT (OUTPATIENT)
Dept: MRI IMAGING | Facility: HOSPITAL | Age: 67
End: 2022-09-28

## 2022-09-28 ENCOUNTER — OUTPATIENT (OUTPATIENT)
Dept: OUTPATIENT SERVICES | Facility: HOSPITAL | Age: 67
LOS: 1 days | End: 2022-09-28
Payer: MEDICARE

## 2022-09-28 DIAGNOSIS — Z96.652 PRESENCE OF LEFT ARTIFICIAL KNEE JOINT: Chronic | ICD-10-CM

## 2022-09-28 DIAGNOSIS — M65.9 SYNOVITIS AND TENOSYNOVITIS, UNSPECIFIED: ICD-10-CM

## 2022-09-28 DIAGNOSIS — Z98.89 OTHER SPECIFIED POSTPROCEDURAL STATES: Chronic | ICD-10-CM

## 2022-09-28 DIAGNOSIS — M17.11 UNILATERAL PRIMARY OSTEOARTHRITIS, RIGHT KNEE: ICD-10-CM

## 2022-09-28 PROCEDURE — 73721 MRI JNT OF LWR EXTRE W/O DYE: CPT

## 2022-09-28 PROCEDURE — 73721 MRI JNT OF LWR EXTRE W/O DYE: CPT | Mod: 26,RT

## 2022-09-29 ENCOUNTER — NON-APPOINTMENT (OUTPATIENT)
Age: 67
End: 2022-09-29

## 2022-09-29 ENCOUNTER — APPOINTMENT (OUTPATIENT)
Dept: CARDIOLOGY | Facility: CLINIC | Age: 67
End: 2022-09-29

## 2022-09-29 VITALS
BODY MASS INDEX: 24.92 KG/M2 | DIASTOLIC BLOOD PRESSURE: 79 MMHG | HEART RATE: 66 BPM | WEIGHT: 184 LBS | HEIGHT: 72 IN | SYSTOLIC BLOOD PRESSURE: 153 MMHG | RESPIRATION RATE: 19 BRPM | TEMPERATURE: 97.2 F | OXYGEN SATURATION: 98 %

## 2022-09-29 DIAGNOSIS — I10 ESSENTIAL (PRIMARY) HYPERTENSION: ICD-10-CM

## 2022-09-29 DIAGNOSIS — Z86.73 PERSONAL HISTORY OF TRANSIENT ISCHEMIC ATTACK (TIA), AND CEREBRAL INFARCTION W/OUT RESIDUAL DEFICITS: ICD-10-CM

## 2022-09-29 PROCEDURE — 93000 ELECTROCARDIOGRAM COMPLETE: CPT

## 2022-09-29 PROCEDURE — 99214 OFFICE O/P EST MOD 30 MIN: CPT | Mod: 25

## 2022-09-29 NOTE — CARDIOLOGY SUMMARY
[de-identified] : ECG (6/16/22): normal sinus rhythm \par ECG (9/29/22): normal sinus rhythm with sinus arrhythmia, first degree AV block  [de-identified] : Nuclear Stress Test (9/2022): Normal myocardial perfusion. Post stress LVEF 61%  [de-identified] : TTE (5/23/22): LVEF 65%. Normal RV size and function. No pericardial effusion.

## 2022-09-29 NOTE — REVIEW OF SYSTEMS
[Headache] : no headache [SOB] : no shortness of breath [Chest Discomfort] : no chest discomfort [Lower Ext Edema] : no extremity edema [Palpitations] : no palpitations [Cough] : no cough [Abdominal Pain] : no abdominal pain [Joint Pain] : no joint pain [Rash] : no rash [Dizziness] : no dizziness [Confusion] : no confusion was observed [Easy Bruising] : no tendency for easy bruising

## 2022-09-29 NOTE — HISTORY OF PRESENT ILLNESS
[FreeTextEntry1] : Timmy Newman is a 67 year old man with past medical history of Hypertension & Alcohol use with recent hospitalization for difficulty with speech found to have acute left MCA territory infarct in frontal lobe and severe focal narrowing of left supraclinoid ICA cerebral artery s/p left supraclinoid ICA stent presents for follow-up visit regarding test results.\par \par Since his last visit he reports that he has been doing relatively well. Denies chest discomfort, shortness of breath or palpitations. Denies leg swelling. Denies syncope. Reports that he has some gait difficulties. Reports that he will be going away for a few weeks.

## 2022-09-29 NOTE — PHYSICAL EXAM
I called Vicenta 3:09 pm 10/11/21 cell phone that surgery is cancelled lm   [Normal Conjunctiva] : normal conjunctiva [Normal] : alert and oriented, normal memory [de-identified] : elderly man, no acute distress [de-identified] : supple [de-identified] : JVP ~ 7 cm H20, RRR, s1, s2, no murmurs/rubs [de-identified] : unlabored respirations, CTAB [de-identified] : no lower extremity edema b/l

## 2022-09-29 NOTE — ASSESSMENT
[FreeTextEntry1] : Assessment:\par Timmy Newman is a 67 year old man with past medical history of Hypertension & Alcohol use with recent hospitalization for difficulty with speech found to have acute left MCA territory infarct in frontal lobe and severe focal narrowing of left supraclinoid ICA cerebral artery s/p left supraclinoid ICA stent presents for follow-up visit regarding test results.\par \par Since his last visit he reports that he has been doing well, denies exertional angina or dyspnea. ECG consistent with sinus rhythm with sinus arrhythmia. Nuclear stress test was consistent with normal myocardial perfusion with normal post stress LVEF 61%. Recent echo consistent with normal LV and RV systolic function. \par \par Recommendations:\par [] Risk stratification: Unremarkable nuclear stress test and so the likelihood of obstructive CAD appears low at this time but explained to patient that given age and risk factors he likely has some degree of atherosclerosis. LDL 79 mg/dl, continue statin as per below. HbA1C 5.6 is stable. Patient should also avoid alcohol use.\par [] Hypertension: BP elevated in office today, patient unaware that he has a diagnosis of hypertension, appears he is taking Lisinopril 10 mg daily. Recommended patient to monitor BP daily at home and keep log. Goal BP < 140/90\par [] CVA: Follow up Neurosurgery, currently taking Aspirin and Brilinta. Continue high intensity statin.\par [] Return to office: 6 months

## 2022-10-11 NOTE — ED PROVIDER NOTE - DISPOSITION TYPE
Sarkisrusty WILBURN Dennise  : 1987  Primary: [facility]  Secondary:  [address] [address]  Phone: [phone]  Fax: [phone] Plan Frequency: twice a week for 12 weeks    Plan of Care/Certification Expiration Date: 22      PT Visit Info: Total # of Visits Approved: 24 (-)    Visit Count:  4  Progress Note Counter: 4  No Show: 1      OUTPATIENT PHYSICAL THERAPY:OP NOTE TYPE: Treatment Note 10/11/2022       Episode  }Appt Desk             Treatment Diagnosis:  Low back pain (M54.5)  Pain in right leg (M79.604)   Muscle weakness (generalized) (M62.81)   Medical/Referring Diagnosis:  Prenatal care, subsequent pregnancy, second trimester [Z34.82]  17 weeks gestation of pregnancy [Z3A.17]  Back pain, unspecified back location, unspecified back pain laterality, unspecified chronicity [M54.9]  Referring Physician:  Darrel Amaya MD MD Orders:  PT Eval and Treat   Date of Onset:  Onset Date: 22 (beginning of August (no date specified))     Allergies:   Patient has no known allergies. Restrictions/Precautions:  No data recordedNo data recorded   Interventions Planned (Treatment may consist of any combination of the following):    Current Treatment Recommendations: Strengthening; ROM; Functional mobility training; Transfer training; Endurance training; Gait training; Stair training; Neuromuscular re-education; Manual Therapy - Soft Tissue Mobilization; Manual Therapy - Joint Manipulation; Pain management; Return to work related activity; Home exercise program; Patient/Caregiver education & training; Positioning; Modalities;  Therapeutic activities     Subjective Comments:  Pt 11 minutes late to session due to not being able to find parking - states she has had a busy day at work and had to carry a lot so she is in more pain  Initial:}    6/10Post Session:       4/10  Medications Last Reviewed: 10/11/2022  Updated Objective Findings:   R LE shorter functionally on 10/11/2022  Treatment   Therapeutic Exercise: ( 13 minutes):  Exercises per grid below to improve mobility, strength, and dynamic movement of lower back and bilateral legs to improve functional mobility . Required minimal visual, verbal, and manual cues to promote proper body alignment, promote proper body posture, promote proper body mechanics, and promote proper body breathing techniques. Progressed resistance, range, repetitions, and complexity of movement as indicated. Most recent tx:   Date:  10/11/2022 Date:  10/5/2022 Date:  9/26/2022   Activity/Exercise   Parameters   Nustep L4 resistance for 10 minutes with B LEs and UEs to increase blood flow L4 resistance for 10 minutes with B LEs and UEs to increase blood flow L4 resistance for 10 minutes with B LEs and UEs to increase blood flow   Ankle plantarflexor stretch on incline board 30 second hold x 3 reps with knees extended then flexed;  B UE support 30 second hold x 3 reps with knees extended then flexed;  B UE support 30 second hold x 3 reps with knees extended then flexed;  B UE support   Sidelying hip clamshells   20 reps/1 set each LE with cues for slow lifting/lowering                           *given in HEP  [employer] Portal   Pt given following band colors: [] Yellow          [] Red          [] Green          [] Blue          [] Grey      Manual therapy (27 minutes):   With pt in sitting position:   - Performed modified muscle energy technique at pelvis (resisted R hip flexion and L hip extension and vice versa x 3 reps, followed by resisted hip abduction/adduction x 3 reps, and bridging x 3 reps) x 1 sets to improve pelvic symmetry and reduce muscular tightness and pain   With pt in L sidelying position:   - STM and trigger point release to R>L gluteal musculature, lower portion of latissimus dorsi, and quadratus lumborum to reduce muscular tightness and pain    Modalities (    Treatment/Session Summary:    Igor Ngo has now attended 4 total therapy sessions (4 sessions since last assessment). Treatment Assessment:  Pt with increased pain in low back today that improved slightly with manual therapy. She was noted to have a functional leg length discrepancy that resolved after performing modified muscle energy technique for pelvis in sitting position. Will try to incorporate lumbar stability and strengthening exercises next session to improve her posture and alignment. Communication/Consultation:  None today  Equipment provided today:  None  Recommendations/Intent for next treatment session: Next visit will focus on manual therapy/modalities as needed to reduce pain; addressing pelvic alignment, core strengthening, LE stretching.     Total Treatment Billable Duration:  40 minutes  Time In: 1723  Time Out: [address] [facility], PT       Charge Capture  }Post Session Pain  PT Visit Info  [facility] Portal  MD Guidelines  Scanned Media  Benefits  MyChart    Future Appointments   Date Time Provider [place]   10/14/2022 10:15 AM Claudia Drake PT [facility]   10/17/2022 10:15 AM Claudia Drake PT [facility]   10/18/2022 10:30 AM [facility] US 1 VD [facility]   10/18/2022 11:00 AM Sydnie Bingham MD [facility]   10/20/2022 10:15 AM Claudia Drake PT [facility]   10/24/2022 10:15 AM Radha Graham PTA [facility]   10/27/2022  9:30 AM Radha Graham PTA [facility]   11/1/2022 10:15 AM Radha Graham PTA [facility]   11/3/2022 10:15 AM Radha Graham PTA [facility] DISCHARGE

## 2022-10-14 ENCOUNTER — APPOINTMENT (OUTPATIENT)
Dept: FAMILY MEDICINE | Facility: CLINIC | Age: 67
End: 2022-10-14

## 2022-10-14 VITALS
OXYGEN SATURATION: 98 % | SYSTOLIC BLOOD PRESSURE: 122 MMHG | RESPIRATION RATE: 18 BRPM | WEIGHT: 184.38 LBS | TEMPERATURE: 97.3 F | DIASTOLIC BLOOD PRESSURE: 70 MMHG | HEIGHT: 72 IN | HEART RATE: 90 BPM | BODY MASS INDEX: 24.97 KG/M2

## 2022-10-14 DIAGNOSIS — M17.11 UNILATERAL PRIMARY OSTEOARTHRITIS, RIGHT KNEE: ICD-10-CM

## 2022-10-14 PROCEDURE — 99214 OFFICE O/P EST MOD 30 MIN: CPT

## 2022-10-14 RX ORDER — IBUPROFEN 800 MG/1
800 TABLET, FILM COATED ORAL
Qty: 90 | Refills: 0 | Status: DISCONTINUED | COMMUNITY
Start: 2021-11-29 | End: 2022-10-14

## 2022-10-14 NOTE — HEALTH RISK ASSESSMENT
[Never] : Never [No] : In the past 12 months have you used drugs other than those required for medical reasons? No [No falls in past year] : Patient reported no falls in the past year [de-identified] : sedentary [de-identified] : regural

## 2022-10-14 NOTE — HISTORY OF PRESENT ILLNESS
[FreeTextEntry8] : c/o right shoulder pain in the front  , right wrist and right  knee pain, sudden onset,  for a week. No insect bite, fever. Right Ankle swells up. Right wrist is swollen. He had this years ago and was diagnosed with arthritis. He had trouble walking due to pain today. No trauma.Right leg feels weak at knee level . No headache, dizziness, fall, blurred vision, N/V.   He is wearing wrist brace. He is left handed. He stopped steroids few weeks ago as his joints were not hurting then.

## 2022-10-14 NOTE — PHYSICAL EXAM
[No Acute Distress] : no acute distress [Well Nourished] : well nourished [Well Developed] : well developed [Well-Appearing] : well-appearing [Normal Sclera/Conjunctiva] : normal sclera/conjunctiva [PERRL] : pupils equal round and reactive to light [EOMI] : extraocular movements intact [Normal Outer Ear/Nose] : the outer ears and nose were normal in appearance [Normal Oropharynx] : the oropharynx was normal [No JVD] : no jugular venous distention [No Lymphadenopathy] : no lymphadenopathy [Supple] : supple [Thyroid Normal, No Nodules] : the thyroid was normal and there were no nodules present [No Respiratory Distress] : no respiratory distress  [No Accessory Muscle Use] : no accessory muscle use [Clear to Auscultation] : lungs were clear to auscultation bilaterally [Normal Rate] : normal rate  [Regular Rhythm] : with a regular rhythm [Normal S1, S2] : normal S1 and S2 [No Murmur] : no murmur heard [No Carotid Bruits] : no carotid bruits [No Abdominal Bruit] : a ~M bruit was not heard ~T in the abdomen [No Varicosities] : no varicosities [Pedal Pulses Present] : the pedal pulses are present [No Edema] : there was no peripheral edema [No Palpable Aorta] : no palpable aorta [No Extremity Clubbing/Cyanosis] : no extremity clubbing/cyanosis [Soft] : abdomen soft [Non Tender] : non-tender [Non-distended] : non-distended [No Masses] : no abdominal mass palpated [No HSM] : no HSM [Normal Bowel Sounds] : normal bowel sounds [Normal Posterior Cervical Nodes] : no posterior cervical lymphadenopathy [Normal Anterior Cervical Nodes] : no anterior cervical lymphadenopathy [No CVA Tenderness] : no CVA  tenderness [No Spinal Tenderness] : no spinal tenderness [No Joint Swelling] : no joint swelling [Grossly Normal Strength/Tone] : grossly normal strength/tone [No Rash] : no rash [Coordination Grossly Intact] : coordination grossly intact [No Focal Deficits] : no focal deficits [Normal Gait] : normal gait [Deep Tendon Reflexes (DTR)] : deep tendon reflexes were 2+ and symmetric [Normal Affect] : the affect was normal [Normal Insight/Judgement] : insight and judgment were intact [de-identified] : able to make fist, noted  right wrist swelling and right knee stiffness with decreased ROM , difficulty raising up right arm , restricted shoulder mobility on right

## 2022-10-21 ENCOUNTER — APPOINTMENT (OUTPATIENT)
Dept: FAMILY MEDICINE | Facility: CLINIC | Age: 67
End: 2022-10-21

## 2022-10-21 ENCOUNTER — APPOINTMENT (OUTPATIENT)
Dept: NEUROLOGY | Facility: CLINIC | Age: 67
End: 2022-10-21

## 2022-10-21 VITALS
HEIGHT: 72 IN | RESPIRATION RATE: 17 BRPM | BODY MASS INDEX: 25.6 KG/M2 | WEIGHT: 189 LBS | OXYGEN SATURATION: 98 % | TEMPERATURE: 97.3 F | DIASTOLIC BLOOD PRESSURE: 64 MMHG | HEART RATE: 71 BPM | SYSTOLIC BLOOD PRESSURE: 138 MMHG

## 2022-10-21 DIAGNOSIS — M25.511 PAIN IN RIGHT SHOULDER: ICD-10-CM

## 2022-10-21 DIAGNOSIS — M25.561 PAIN IN RIGHT KNEE: ICD-10-CM

## 2022-10-21 DIAGNOSIS — M12.9 ARTHROPATHY, UNSPECIFIED: ICD-10-CM

## 2022-10-21 PROCEDURE — 93880 EXTRACRANIAL BILAT STUDY: CPT

## 2022-10-21 PROCEDURE — 99214 OFFICE O/P EST MOD 30 MIN: CPT

## 2022-10-21 PROCEDURE — 93888 INTRACRANIAL LIMITED STUDY: CPT

## 2022-10-21 NOTE — HEALTH RISK ASSESSMENT
[Never] : Never [No] : In the past 12 months have you used drugs other than those required for medical reasons? No [No falls in past year] : Patient reported no falls in the past year [de-identified] : sedentary [de-identified] : regural

## 2022-10-21 NOTE — HISTORY OF PRESENT ILLNESS
[FreeTextEntry8] : Here for f/u right shoulder pain in the front  , right wrist and right  knee pain, sudden onset,  couple weeks ago . No insect bite, fever.  He had this years ago and was diagnosed with arthritis. He has taken Prednisone and is currently taking it and has noted improvement in pain , swelling and restricted ROM in affected joints.He has no  trouble walking.

## 2022-10-21 NOTE — PHYSICAL EXAM
[No Acute Distress] : no acute distress [Well Nourished] : well nourished [Well Developed] : well developed [Well-Appearing] : well-appearing [Normal Sclera/Conjunctiva] : normal sclera/conjunctiva [PERRL] : pupils equal round and reactive to light [EOMI] : extraocular movements intact [Normal Outer Ear/Nose] : the outer ears and nose were normal in appearance [Normal Oropharynx] : the oropharynx was normal [No JVD] : no jugular venous distention [No Lymphadenopathy] : no lymphadenopathy [Supple] : supple [Thyroid Normal, No Nodules] : the thyroid was normal and there were no nodules present [No Respiratory Distress] : no respiratory distress  [No Accessory Muscle Use] : no accessory muscle use [Clear to Auscultation] : lungs were clear to auscultation bilaterally [Normal Rate] : normal rate  [Regular Rhythm] : with a regular rhythm [Normal S1, S2] : normal S1 and S2 [No Murmur] : no murmur heard [No Carotid Bruits] : no carotid bruits [No Abdominal Bruit] : a ~M bruit was not heard ~T in the abdomen [Pedal Pulses Present] : the pedal pulses are present [No Varicosities] : no varicosities [No Edema] : there was no peripheral edema [No Palpable Aorta] : no palpable aorta [No Extremity Clubbing/Cyanosis] : no extremity clubbing/cyanosis [Soft] : abdomen soft [Non Tender] : non-tender [Non-distended] : non-distended [No Masses] : no abdominal mass palpated [No HSM] : no HSM [Normal Bowel Sounds] : normal bowel sounds [Normal Posterior Cervical Nodes] : no posterior cervical lymphadenopathy [Normal Anterior Cervical Nodes] : no anterior cervical lymphadenopathy [No CVA Tenderness] : no CVA  tenderness [No Spinal Tenderness] : no spinal tenderness [No Joint Swelling] : no joint swelling [Grossly Normal Strength/Tone] : grossly normal strength/tone [No Rash] : no rash [Coordination Grossly Intact] : coordination grossly intact [No Focal Deficits] : no focal deficits [Normal Gait] : normal gait [Deep Tendon Reflexes (DTR)] : deep tendon reflexes were 2+ and symmetric [Normal Affect] : the affect was normal [Normal Insight/Judgement] : insight and judgment were intact [de-identified] : able to make fist, no right wrist edema or right knee stiffness, improved ROM of right shoulder

## 2022-10-21 NOTE — PLAN
[FreeTextEntry1] : It appears to be inflammatory rheumatological process.Taper  off Prednisone 10 mg  over next couple weeks and Ice affected joint.\par f/u rheumatology.\par  ROM exercises.

## 2022-10-24 ENCOUNTER — APPOINTMENT (OUTPATIENT)
Dept: NEUROLOGY | Facility: CLINIC | Age: 67
End: 2022-10-24

## 2022-10-24 ENCOUNTER — APPOINTMENT (OUTPATIENT)
Age: 67
End: 2022-10-24

## 2022-10-24 VITALS
HEIGHT: 72 IN | DIASTOLIC BLOOD PRESSURE: 74 MMHG | WEIGHT: 189 LBS | HEART RATE: 62 BPM | BODY MASS INDEX: 25.6 KG/M2 | SYSTOLIC BLOOD PRESSURE: 155 MMHG

## 2022-10-24 DIAGNOSIS — I67.2 CEREBRAL ATHEROSCLEROSIS: ICD-10-CM

## 2022-10-24 PROCEDURE — 99215 OFFICE O/P EST HI 40 MIN: CPT

## 2022-11-16 ENCOUNTER — RESULT REVIEW (OUTPATIENT)
Age: 67
End: 2022-11-16

## 2022-11-16 ENCOUNTER — APPOINTMENT (OUTPATIENT)
Dept: MRI IMAGING | Facility: HOSPITAL | Age: 67
End: 2022-11-16

## 2022-11-16 ENCOUNTER — OUTPATIENT (OUTPATIENT)
Dept: OUTPATIENT SERVICES | Facility: HOSPITAL | Age: 67
LOS: 1 days | End: 2022-11-16
Payer: MEDICARE

## 2022-11-16 DIAGNOSIS — Z98.89 OTHER SPECIFIED POSTPROCEDURAL STATES: Chronic | ICD-10-CM

## 2022-11-16 DIAGNOSIS — I65.22 OCCLUSION AND STENOSIS OF LEFT CAROTID ARTERY: ICD-10-CM

## 2022-11-16 DIAGNOSIS — Z96.652 PRESENCE OF LEFT ARTIFICIAL KNEE JOINT: Chronic | ICD-10-CM

## 2022-11-16 PROCEDURE — 70544 MR ANGIOGRAPHY HEAD W/O DYE: CPT | Mod: 26,59

## 2022-11-16 PROCEDURE — 70544 MR ANGIOGRAPHY HEAD W/O DYE: CPT

## 2022-11-16 PROCEDURE — 70551 MRI BRAIN STEM W/O DYE: CPT | Mod: 26

## 2022-11-16 PROCEDURE — 70551 MRI BRAIN STEM W/O DYE: CPT

## 2022-11-17 ENCOUNTER — APPOINTMENT (OUTPATIENT)
Dept: MRI IMAGING | Facility: HOSPITAL | Age: 67
End: 2022-11-17

## 2022-11-18 ENCOUNTER — APPOINTMENT (OUTPATIENT)
Dept: RHEUMATOLOGY | Facility: CLINIC | Age: 67
End: 2022-11-18

## 2022-11-23 ENCOUNTER — APPOINTMENT (OUTPATIENT)
Dept: NEUROSURGERY | Facility: CLINIC | Age: 67
End: 2022-11-23

## 2022-11-23 VITALS
BODY MASS INDEX: 25.6 KG/M2 | HEART RATE: 62 BPM | SYSTOLIC BLOOD PRESSURE: 113 MMHG | WEIGHT: 189 LBS | HEIGHT: 72 IN | TEMPERATURE: 97.3 F | OXYGEN SATURATION: 96 % | DIASTOLIC BLOOD PRESSURE: 64 MMHG

## 2022-11-23 PROCEDURE — 99214 OFFICE O/P EST MOD 30 MIN: CPT

## 2022-11-23 RX ORDER — PREDNISONE 5 MG/1
5 TABLET ORAL
Qty: 60 | Refills: 1 | Status: DISCONTINUED | COMMUNITY
Start: 2022-08-05 | End: 2022-11-23

## 2022-11-23 RX ORDER — HYALURONATE SODIUM, STABILIZED 88 MG/4 ML
88 SYRINGE (ML) INTRAARTICULAR
Qty: 1 | Refills: 0 | Status: DISCONTINUED | COMMUNITY
Start: 2021-08-06 | End: 2022-11-23

## 2022-11-23 RX ORDER — METOPROLOL SUCCINATE 25 MG/1
25 TABLET, EXTENDED RELEASE ORAL
Qty: 2 | Refills: 0 | Status: DISCONTINUED | COMMUNITY
Start: 2022-06-20 | End: 2022-11-23

## 2022-11-23 NOTE — HISTORY OF PRESENT ILLNESS
[FreeTextEntry1] : CAROLINE ROBERTO is a 67 year old left hand dominant male with PMH of macular degeneration (no vision loss) and EtOH use (daily 6-7 beers/day), recently returned from [facility] on 5/21 “not feeling well,” presented to [facility] with acute expressive aphasia/one word answers, CTH showed L MCA territory infarct in L frontal lobe, CTA showed severe focal stenosis of left supraclinoid ICA, transferred to [facility] for tertiary stroke treatment on 5/22. Diagnostic Cerebral angiogram on 5/23/2022 demonstrated severe stenosis with near complete occlusion of the supraclinoid left ICA causing intracranial flow limitation. On 5/26, acute worsening right sided weakness s/p intracranial stenting of the left supraclinoid ICA using Resolute drug eluting stent resulting in significant improvement in vessel caliber and flow throughout the left MCA territory. There is residual moderate to severe narrowing within the proximal M1 segment of the left MCA. Discharged home on aspirin 81mg daily and Brilinta 60mg BID.\par \par Today, he is doing overall neurologically well with no complaints. He does not use cane to ambulate for assistance. States his speech has improved, still doing speech therapy. Occasional word finding difficulty. Denies weakness, numbness, tingling on the right side. He is compliant with ASA and Brilinta. Has followed up with stroke neurology, Dr. Morales.

## 2022-11-23 NOTE — ASSESSMENT
[FreeTextEntry1] : IMPRESSION:\par 67M with PMH of macular degeneration, EtOH use, recent trip to [place], p/w expressive aphasia, found to have L MCA infarct, ischemic stroke in the setting of L supraclinoid ICA severe stenosis s/p dx angio 5/23 demonstrating diffuse intracranial atherosclerotic disease, most severe within L ICA supraclinoid segment where there is a focal point of near complete occlusion just proximal to the ICA terminus resulting in significant flow limitation. 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 Exam worse 5/26 with right hemiparesis, s/p successful left supraclinoid ICA drug eluting stent with improvement in flow throughout left MCA territory but residual moderate to severe narrowing within the proximal M1 segment of the left MCA. Discharged on ASA 81 and Brilinta 60mg BID.\par \par He continues to do clinically well. Reports continued improvement in speech; however, occasional word finding difficulty.. Denies weakness on one side of the body, stroke-like symptoms, and other symptoms of motor, sensory, speech, or visual abnormalities. Denies issues with bleeding while compliant with ASA and Brilinta. \par \par 3 month MRI/MRA NOVA 8/9 shows stent in the left supraclinoid internal carotid artery with diminished flow in the left middle cerebral artery branches, similar to the prior exam. Less flow in the left internal carotid artery is identified compared with the prior exam.\par \par 6 month MRI/MRA NOVA 11/16 shows No change in large left frontal cortical and centrum semiovale infarct compared with 8/9/2022. No acute infarcts. There is slightly improved flow in the left internal carotid artery and left middle cerebral arteries compared with the prior exam (51 compared to 35).\par \par Although the flow through L MCA is still on the low side, it is improved since August. We discussed the risks vs benefits of placing another stent within the residual narrowing in the M1. Since he continues to clinically do much better, at this time the risks of additional stent outweigh the benefits, particularly in the M1 where there are lenticulostriate perforators. If the flow in the left MCA continues to decrease on subsequent NOVA or he has new symptoms, we can discuss additional stenting. \par \par \par \par PLAN:\par Given patient is generally tolerating ASA and Brilinta, continue both for additional 6 months\par Continue following up with stroke neurologist, Dr. Morales\par Recommend conservative management with serial MRIs, as risks of additional stent outweigh benefits given his significant improvement neurologically and current lack of symptoms\par Repeat MRI brain non con, MRA brain non con NOVA in 6 months - 1 year post-stenting (May 2023)\par Follow up in office after to discuss results. \par

## 2022-11-23 NOTE — REASON FOR VISIT
[Follow-Up: _____] : a [unfilled] follow-up visit [FreeTextEntry1] : Reviewed results of 6 month follow up MRI brain non con, MRA brain non con NOVA done 11/16/22\par \par s/p cerebral angiogram and intracranial stenting of the stenotic left supraclinoid ICA using a 2.25 x 8 mm RESOLUTE drug eluting stent 5/26/22\par \par s/p diagnostic cerebral angiogram 5/23/22

## 2022-11-23 NOTE — PHYSICAL EXAM
[Person] : oriented to person [Place] : oriented to place [Time] : oriented to time [Motor Tone] : muscle tone was normal in all four extremities [Motor Strength] : muscle strength was normal in all four extremities [Abnormal Walk] : normal gait [Balance] : balance was intact [Cranial Nerves Optic (II)] : visual acuity intact bilaterally,  pupils equal round and reactive to light [Cranial Nerves Oculomotor (III)] : extraocular motion intact [Cranial Nerves Trigeminal (V)] : facial sensation intact symmetrically [Cranial Nerves Facial (VII)] : face symmetrical [Cranial Nerves Vestibulocochlear (VIII)] : hearing was intact bilaterally [Cranial Nerves Glossopharyngeal (IX)] : tongue and palate midline [Cranial Nerves Accessory (XI - Cranial And Spinal)] : head turning and shoulder shrug symmetric [Cranial Nerves Hypoglossal (XII)] : there was no tongue deviation with protrusion

## 2022-11-23 NOTE — REVIEW OF SYSTEMS
[As Noted in HPI] : as noted in HPI [Negative] : Heme/Lymph [Difficulties in Speech] : speech difficulties [de-identified] : word finding difficulty

## 2022-12-13 NOTE — PHYSICAL THERAPY INITIAL EVALUATION ADULT - PATIENT/FAMILY/SIGNIFICANT OTHER GOALS STATEMENT, PT EVAL
to feel better and go home Spironolactone Pregnancy And Lactation Text: This medication can cause feminization of the male fetus and should be avoided during pregnancy. The active metabolite is also found in breast milk.

## 2023-02-14 NOTE — REVIEW OF SYSTEMS
Going for cataract in April    Will do paperwork then [Joint Pain] : joint pain [Negative] : Heme/Lymph

## 2023-05-16 ENCOUNTER — APPOINTMENT (OUTPATIENT)
Dept: OPHTHALMOLOGY | Facility: CLINIC | Age: 68
End: 2023-05-16

## 2023-05-23 ENCOUNTER — APPOINTMENT (OUTPATIENT)
Dept: MRI IMAGING | Facility: HOSPITAL | Age: 68
End: 2023-05-23

## 2023-05-23 ENCOUNTER — OUTPATIENT (OUTPATIENT)
Dept: OUTPATIENT SERVICES | Facility: HOSPITAL | Age: 68
LOS: 1 days | End: 2023-05-23
Payer: MEDICARE

## 2023-05-23 DIAGNOSIS — Z98.89 OTHER SPECIFIED POSTPROCEDURAL STATES: Chronic | ICD-10-CM

## 2023-05-23 DIAGNOSIS — Z96.652 PRESENCE OF LEFT ARTIFICIAL KNEE JOINT: Chronic | ICD-10-CM

## 2023-05-23 DIAGNOSIS — I65.22 OCCLUSION AND STENOSIS OF LEFT CAROTID ARTERY: ICD-10-CM

## 2023-05-23 DIAGNOSIS — I63.512 CEREBRAL INFARCTION DUE TO UNSPECIFIED OCCLUSION OR STENOSIS OF LEFT MIDDLE CEREBRAL ARTERY: ICD-10-CM

## 2023-05-23 PROCEDURE — 70551 MRI BRAIN STEM W/O DYE: CPT | Mod: 26

## 2023-05-23 PROCEDURE — 70544 MR ANGIOGRAPHY HEAD W/O DYE: CPT | Mod: 26,59

## 2023-05-23 PROCEDURE — 70551 MRI BRAIN STEM W/O DYE: CPT

## 2023-05-23 PROCEDURE — 70544 MR ANGIOGRAPHY HEAD W/O DYE: CPT

## 2023-05-31 ENCOUNTER — NON-APPOINTMENT (OUTPATIENT)
Age: 68
End: 2023-05-31

## 2023-05-31 ENCOUNTER — APPOINTMENT (OUTPATIENT)
Dept: NEUROSURGERY | Facility: CLINIC | Age: 68
End: 2023-05-31
Payer: MEDICARE

## 2023-05-31 ENCOUNTER — APPOINTMENT (OUTPATIENT)
Dept: NEUROLOGY | Facility: CLINIC | Age: 68
End: 2023-05-31
Payer: MEDICARE

## 2023-05-31 VITALS
HEIGHT: 75 IN | BODY MASS INDEX: 23.62 KG/M2 | HEART RATE: 81 BPM | DIASTOLIC BLOOD PRESSURE: 78 MMHG | WEIGHT: 190 LBS | SYSTOLIC BLOOD PRESSURE: 122 MMHG

## 2023-05-31 VITALS
WEIGHT: 189 LBS | TEMPERATURE: 98.6 F | DIASTOLIC BLOOD PRESSURE: 72 MMHG | BODY MASS INDEX: 25.6 KG/M2 | SYSTOLIC BLOOD PRESSURE: 109 MMHG | HEART RATE: 79 BPM | HEIGHT: 72 IN | OXYGEN SATURATION: 94 %

## 2023-05-31 DIAGNOSIS — I65.22 OCCLUSION AND STENOSIS OF LEFT CAROTID ARTERY: ICD-10-CM

## 2023-05-31 PROCEDURE — 99213 OFFICE O/P EST LOW 20 MIN: CPT

## 2023-05-31 RX ORDER — DICLOFENAC SODIUM 75 MG/1
75 TABLET, DELAYED RELEASE ORAL
Qty: 60 | Refills: 0 | Status: DISCONTINUED | COMMUNITY
Start: 2021-10-08 | End: 2023-05-31

## 2023-05-31 NOTE — HISTORY OF PRESENT ILLNESS
[FreeTextEntry1] : CAROLINE ROBERTO is a 68 year old left hand dominant male with PMH of macular degeneration, EtOH use, gout, who returned from [place] on 5/21/22 “not feeling well,” presented to [facility] with acute expressive aphasia, CTH showed L MCA territory infarct in L frontal lobe, CTA showed severe focal stenosis of left supraclinoid ICA, transferred to [facility] for tertiary stroke treatment on 5/22. Diagnostic Cerebral angiogram on 5/23/22 demonstrated severe stenosis with near complete occlusion of the supraclinoid left ICA causing intracranial flow limitation. On 5/26/22, acute worsening right sided weakness s/p intracranial stenting of the left supraclinoid ICA using Resolute drug eluting stent resulting in significant improvement in vessel caliber and flow throughout the left MCA territory. There is residual moderate to severe narrowing within the proximal M1 segment of the left MCA. Discharged home on aspirin 81mg daily and Brilinta 60mg BID.\par \par Today, he is doing overall neurologically well with no complaints. He is able to ambulate independently without assistance. States his speech has improved. Denies weakness, numbness, tingling. He is compliant with ASA and Brilinta. Endorses left arm swelling and joint pain which he attributes to gout. He has follow up with stroke neurology scheduled.

## 2023-05-31 NOTE — REASON FOR VISIT
[Follow-Up: _____] : a [unfilled] follow-up visit [Family Member] : family member [FreeTextEntry1] : Reviewed results of annual MRI brain non con, MRA brain non con NOVA done 5/23/23\par \par s/p cerebral angiogram and intracranial stenting of the stenotic left supraclinoid ICA using a 2.25 x 8 mm RESOLUTE drug eluting stent 5/26/22\par \par s/p diagnostic cerebral angiogram 5/23/22

## 2023-05-31 NOTE — ASSESSMENT
[FreeTextEntry1] : IMPRESSION:\par 68M with PMH of macular degeneration, EtOH abuse p/w expressive aphasia, found to have L MCA infarct, ischemic stroke in the setting of L supraclinoid ICA severe stenosis s/p dx angio 5/23/22 demonstrating diffuse intracranial atherosclerotic disease, most severe within L ICA supraclinoid segment where there is a focal point of near complete occlusion just proximal to the ICA terminus resulting in significant flow limitation. 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 Exam worse 5/26/22 with R hemiparesis, s/p successful L supraclinoid ICA drug eluting stent with improvement in flow throughout L MCA territory but residual moderate to severe narrowing within the proximal M1 segment of the L MCA. Discharged on ASA 81 and Brilinta 60mg BID.\par \par He continues to do clinically well. Reports continued improvement in speech; however, occasional word finding difficulty. Denies weakness on one side of the body, stroke-like symptoms, and other symptoms of motor, sensory, speech, or visual abnormalities. Denies issues with bleeding while compliant with ASA and Brilinta. \par \par Follow up MRI/MRA NOVA 5/23/23, now 1 year post stenting, shows decreased flow through L MCA, stable flow in left ICA. We discussed the risks vs benefits of placing another stent within the residual narrowing in the M1. On the one hand he is asymptomatic, but the numbers continue to decline on NOVA. At this point with this conflicting information I believe a repeat angiogram is warranted in order to help us make the most informed decision. Will discuss with Dr. Morales from stroke neurology as well. \par \par He has recently run out of Brilinta and stopped taking. Since it has been a full year, rec increasing aspirin 81 to 325 and remaining off brilinta. \par \par \par \par \par PLAN:\par Continue follow up with stroke neurology, has follow up with NP later today\par Follow up with rheumatologist for gout/joint pain management\par Schedule follow up cerebral angiogram at [facility] for 6/15/23\par

## 2023-06-01 ENCOUNTER — APPOINTMENT (OUTPATIENT)
Dept: FAMILY MEDICINE | Facility: CLINIC | Age: 68
End: 2023-06-01
Payer: MEDICARE

## 2023-06-01 VITALS
BODY MASS INDEX: 26.01 KG/M2 | DIASTOLIC BLOOD PRESSURE: 65 MMHG | WEIGHT: 192 LBS | TEMPERATURE: 97.2 F | RESPIRATION RATE: 16 BRPM | OXYGEN SATURATION: 98 % | HEIGHT: 72 IN | SYSTOLIC BLOOD PRESSURE: 110 MMHG | HEART RATE: 86 BPM

## 2023-06-01 DIAGNOSIS — M25.529 PAIN IN UNSPECIFIED ELBOW: ICD-10-CM

## 2023-06-01 PROCEDURE — 99214 OFFICE O/P EST MOD 30 MIN: CPT

## 2023-06-01 RX ORDER — ASPIRIN 325 MG/1
325 TABLET, FILM COATED ORAL
Qty: 28 | Refills: 0 | Status: ACTIVE | COMMUNITY
Start: 2023-06-01

## 2023-06-01 RX ORDER — TICAGRELOR 60 MG/1
60 TABLET ORAL TWICE DAILY
Qty: 180 | Refills: 3 | Status: COMPLETED | COMMUNITY
End: 2023-06-01

## 2023-06-01 RX ORDER — ASPIRIN 81 MG/1
81 TABLET, CHEWABLE ORAL
Qty: 90 | Refills: 3 | Status: COMPLETED | COMMUNITY
Start: 2022-08-26 | End: 2023-06-01

## 2023-06-01 NOTE — HISTORY OF PRESENT ILLNESS
[FreeTextEntry8] : left hand edema and pain over first MCP joint that travels down to wrist . Also developed acute pain in left elbow a day later. \par Hand pain is described as intermittent 8 out of ten throbbing pain that has been persistent. Has not taken anything for the pain. \par History of similar episode in the past,  swelling of foot when he was in [facility]. \par Denies fever, chills, nausea, or vomiting. History of significantly elevated crp and esr in the past. \par \par

## 2023-06-01 NOTE — ASSESSMENT
[FreeTextEntry1] : Multiple synovitis of the left hand and elbow.  Questionable gout versus underlying autoimmune condition.  We will treat with course of prednisone.  Can also take Tylenol/Motrin.  Discussed elevation and heat.  Follow-up in 1 week or less if symptoms worsen.\par Uric acid, inflammatory markers sent.\par Briefly discussed with patient's rheumatologist\par We will follow-up accordingly

## 2023-06-01 NOTE — PHYSICAL EXAM
[Normal] : no rash [de-identified] : Moderate edema and tenderness noted over left first MCP joint traveling down to left wrist.  Severe tenderness noted over extensor aspect of right elbow.  No erythema or warmth noted of joints

## 2023-06-02 LAB
ALBUMIN SERPL ELPH-MCNC: 4.6 G/DL
ALP BLD-CCNC: 143 U/L
ALT SERPL-CCNC: 40 U/L
ANION GAP SERPL CALC-SCNC: 16 MMOL/L
AST SERPL-CCNC: 28 U/L
BILIRUB SERPL-MCNC: 0.4 MG/DL
BUN SERPL-MCNC: 17 MG/DL
CALCIUM SERPL-MCNC: 10.2 MG/DL
CHLORIDE SERPL-SCNC: 103 MMOL/L
CO2 SERPL-SCNC: 19 MMOL/L
CREAT SERPL-MCNC: 0.93 MG/DL
CRP SERPL-MCNC: 42 MG/L
EGFR: 88 ML/MIN/1.73M2
ERYTHROCYTE [SEDIMENTATION RATE] IN BLOOD BY WESTERGREN METHOD: 42 MM/HR
GLUCOSE SERPL-MCNC: 104 MG/DL
POTASSIUM SERPL-SCNC: 4.6 MMOL/L
PROT SERPL-MCNC: 6.9 G/DL
RHEUMATOID FACT SER QL: 11 IU/ML
SODIUM SERPL-SCNC: 139 MMOL/L
URATE SERPL-MCNC: 7.3 MG/DL

## 2023-06-05 LAB
CCP AB SER IA-ACNC: <8 UNITS
RF+CCP IGG SER-IMP: NEGATIVE

## 2023-06-08 ENCOUNTER — APPOINTMENT (OUTPATIENT)
Dept: FAMILY MEDICINE | Facility: CLINIC | Age: 68
End: 2023-06-08
Payer: MEDICARE

## 2023-06-08 VITALS
HEIGHT: 72 IN | HEART RATE: 72 BPM | TEMPERATURE: 97.1 F | WEIGHT: 195 LBS | DIASTOLIC BLOOD PRESSURE: 65 MMHG | OXYGEN SATURATION: 98 % | BODY MASS INDEX: 26.41 KG/M2 | RESPIRATION RATE: 16 BRPM | SYSTOLIC BLOOD PRESSURE: 132 MMHG

## 2023-06-08 DIAGNOSIS — R60.0 LOCALIZED EDEMA: ICD-10-CM

## 2023-06-08 DIAGNOSIS — M79.643 PAIN IN UNSPECIFIED HAND: ICD-10-CM

## 2023-06-08 PROCEDURE — 99213 OFFICE O/P EST LOW 20 MIN: CPT

## 2023-06-08 NOTE — ASSESSMENT
[FreeTextEntry1] : Letter given.\par Patient responded very well to steroids.\par If symptoms recur is return to office and we will send to rheumatology.\par Decrease purine intake\par

## 2023-06-08 NOTE — HISTORY OF PRESENT ILLNESS
[de-identified] : Follow-up for idiopathic hand swelling and pain.  Symptoms have almost completely resolved with course of prednisone.  Reviewed blood work which was indicative of increased inflammatory markers.\par \par Needs letter since he was planning on traveling to [place] last week

## 2023-06-12 ENCOUNTER — OUTPATIENT (OUTPATIENT)
Dept: OUTPATIENT SERVICES | Facility: HOSPITAL | Age: 68
LOS: 1 days | End: 2023-06-12
Payer: MEDICARE

## 2023-06-12 VITALS
SYSTOLIC BLOOD PRESSURE: 138 MMHG | RESPIRATION RATE: 16 BRPM | HEIGHT: 72 IN | TEMPERATURE: 98 F | DIASTOLIC BLOOD PRESSURE: 85 MMHG | WEIGHT: 192.9 LBS | OXYGEN SATURATION: 96 % | HEART RATE: 96 BPM

## 2023-06-12 DIAGNOSIS — Z01.818 ENCOUNTER FOR OTHER PREPROCEDURAL EXAMINATION: ICD-10-CM

## 2023-06-12 DIAGNOSIS — I63.9 CEREBRAL INFARCTION, UNSPECIFIED: ICD-10-CM

## 2023-06-12 DIAGNOSIS — Z98.89 OTHER SPECIFIED POSTPROCEDURAL STATES: Chronic | ICD-10-CM

## 2023-06-12 DIAGNOSIS — Z96.652 PRESENCE OF LEFT ARTIFICIAL KNEE JOINT: Chronic | ICD-10-CM

## 2023-06-12 DIAGNOSIS — Z98.890 OTHER SPECIFIED POSTPROCEDURAL STATES: Chronic | ICD-10-CM

## 2023-06-12 DIAGNOSIS — F10.10 ALCOHOL ABUSE, UNCOMPLICATED: ICD-10-CM

## 2023-06-12 LAB
ALBUMIN SERPL ELPH-MCNC: 4.1 G/DL — SIGNIFICANT CHANGE UP (ref 3.3–5)
ALP SERPL-CCNC: 96 U/L — SIGNIFICANT CHANGE UP (ref 40–120)
ALT FLD-CCNC: 32 U/L — SIGNIFICANT CHANGE UP (ref 10–45)
ANION GAP SERPL CALC-SCNC: 16 MMOL/L — SIGNIFICANT CHANGE UP (ref 5–17)
AST SERPL-CCNC: 27 U/L — SIGNIFICANT CHANGE UP (ref 10–40)
BASOPHILS # BLD AUTO: 0.1 K/UL — SIGNIFICANT CHANGE UP (ref 0–0.2)
BASOPHILS NFR BLD AUTO: 0.9 % — SIGNIFICANT CHANGE UP (ref 0–2)
BILIRUB SERPL-MCNC: 0.3 MG/DL — SIGNIFICANT CHANGE UP (ref 0.2–1.2)
BLD GP AB SCN SERPL QL: NEGATIVE — SIGNIFICANT CHANGE UP
BUN SERPL-MCNC: 20 MG/DL — SIGNIFICANT CHANGE UP (ref 7–23)
CALCIUM SERPL-MCNC: 9.5 MG/DL — SIGNIFICANT CHANGE UP (ref 8.4–10.5)
CHLORIDE SERPL-SCNC: 101 MMOL/L — SIGNIFICANT CHANGE UP (ref 96–108)
CO2 SERPL-SCNC: 19 MMOL/L — LOW (ref 22–31)
CREAT SERPL-MCNC: 0.9 MG/DL — SIGNIFICANT CHANGE UP (ref 0.5–1.3)
EGFR: 92 ML/MIN/1.73M2 — SIGNIFICANT CHANGE UP
EOSINOPHIL # BLD AUTO: 0.19 K/UL — SIGNIFICANT CHANGE UP (ref 0–0.5)
EOSINOPHIL NFR BLD AUTO: 1.6 % — SIGNIFICANT CHANGE UP (ref 0–6)
GLUCOSE SERPL-MCNC: 103 MG/DL — HIGH (ref 70–99)
HCT VFR BLD CALC: 45 % — SIGNIFICANT CHANGE UP (ref 39–50)
HGB BLD-MCNC: 15.7 G/DL — SIGNIFICANT CHANGE UP (ref 13–17)
IMM GRANULOCYTES NFR BLD AUTO: 0.9 % — SIGNIFICANT CHANGE UP (ref 0–0.9)
LYMPHOCYTES # BLD AUTO: 2.53 K/UL — SIGNIFICANT CHANGE UP (ref 1–3.3)
LYMPHOCYTES # BLD AUTO: 21.8 % — SIGNIFICANT CHANGE UP (ref 13–44)
MCHC RBC-ENTMCNC: 30.8 PG — SIGNIFICANT CHANGE UP (ref 27–34)
MCHC RBC-ENTMCNC: 34.9 GM/DL — SIGNIFICANT CHANGE UP (ref 32–36)
MCV RBC AUTO: 88.4 FL — SIGNIFICANT CHANGE UP (ref 80–100)
MONOCYTES # BLD AUTO: 1.08 K/UL — HIGH (ref 0–0.9)
MONOCYTES NFR BLD AUTO: 9.3 % — SIGNIFICANT CHANGE UP (ref 2–14)
NEUTROPHILS # BLD AUTO: 7.57 K/UL — HIGH (ref 1.8–7.4)
NEUTROPHILS NFR BLD AUTO: 65.5 % — SIGNIFICANT CHANGE UP (ref 43–77)
NRBC # BLD: 0 /100 WBCS — SIGNIFICANT CHANGE UP (ref 0–0)
PLATELET # BLD AUTO: 237 K/UL — SIGNIFICANT CHANGE UP (ref 150–400)
PLATELET RESPONSE ASPIRIN RESULT: 395 ARU — SIGNIFICANT CHANGE UP (ref 350–700)
POTASSIUM SERPL-MCNC: 4.7 MMOL/L — SIGNIFICANT CHANGE UP (ref 3.5–5.3)
POTASSIUM SERPL-SCNC: 4.7 MMOL/L — SIGNIFICANT CHANGE UP (ref 3.5–5.3)
PROT SERPL-MCNC: 6.9 G/DL — SIGNIFICANT CHANGE UP (ref 6–8.3)
RBC # BLD: 5.09 M/UL — SIGNIFICANT CHANGE UP (ref 4.2–5.8)
RBC # FLD: 12.4 % — SIGNIFICANT CHANGE UP (ref 10.3–14.5)
RH IG SCN BLD-IMP: POSITIVE — SIGNIFICANT CHANGE UP
SODIUM SERPL-SCNC: 136 MMOL/L — SIGNIFICANT CHANGE UP (ref 135–145)
WBC # BLD: 11.58 K/UL — HIGH (ref 3.8–10.5)
WBC # FLD AUTO: 11.58 K/UL — HIGH (ref 3.8–10.5)

## 2023-06-12 PROCEDURE — 86850 RBC ANTIBODY SCREEN: CPT

## 2023-06-12 PROCEDURE — 85025 COMPLETE CBC W/AUTO DIFF WBC: CPT

## 2023-06-12 PROCEDURE — 80053 COMPREHEN METABOLIC PANEL: CPT

## 2023-06-12 PROCEDURE — 36415 COLL VENOUS BLD VENIPUNCTURE: CPT

## 2023-06-12 PROCEDURE — 86900 BLOOD TYPING SEROLOGIC ABO: CPT

## 2023-06-12 PROCEDURE — 86901 BLOOD TYPING SEROLOGIC RH(D): CPT

## 2023-06-12 PROCEDURE — G0463: CPT

## 2023-06-12 PROCEDURE — 85576 BLOOD PLATELET AGGREGATION: CPT

## 2023-06-12 NOTE — H&P PST ADULT - LAST ECHOCARDIOGRAM
5/2022 2/2 CVA - EF 65% with normal left ventricular systolic function, no segmental wall abnormalities

## 2023-06-12 NOTE — H&P PST ADULT - ASSESSMENT
DASI score: 7.25  DASI activity: Able to walk 2-3 blocks, ADLs, Grocery Shopping, 1 Flight of Stairs   Loose teeth or denture: denies

## 2023-06-12 NOTE — H&P PST ADULT - NSICDXPASTMEDICALHX_GEN_ALL_CORE_FT
PAST MEDICAL HISTORY:  Alcohol abuse     CVA (cerebrovascular accident)     Elevated liver enzymes s/p tylenol use after TKR    Glaucoma     Knee pain, left s/p TKR 8/15    Osteoarthritis     Poor historian      PAST MEDICAL HISTORY:  Alcohol abuse     Cerebral infarction, unspecified     CVA (cerebrovascular accident)     Elevated liver enzymes s/p tylenol use after TKR    Glaucoma     Knee pain, left s/p TKR 8/15    Osteoarthritis     Poor historian

## 2023-06-12 NOTE — H&P PST ADULT - NSICDXPASTSURGICALHX_GEN_ALL_CORE_FT
PAST SURGICAL HISTORY:  H/O left inguinal hernia repair     H/O right inguinal hernia repair     History of cerebral angiography     S/P colonoscopy with polypectomy benign, 2 years ago    S/P knee surgery left knee at age 15    Status post total left knee replacement 8/2015

## 2023-06-12 NOTE — H&P PST ADULT - PROBLEM SELECTOR PLAN 1
Pt presents to [facility] for scheduled cerebral angiogram with Dr. Medina on 6/15/23 at the IR suite  CBC, CMP, Plt Asp. Response and T/S ordered and obtained at PST  ABO on admit

## 2023-06-12 NOTE — H&P PST ADULT - HISTORY OF PRESENT ILLNESS
68 year old male with PMH poor historian, HTN, HLD and alcohol abuse reports that he recently had a stroke (5/2022) s/p cerebral angiogram and L supraclinoid ICA stent. Pt now presents to [facility] for scheduled cerebral angiogram with Dr. Medina on 6/15/23 at the interventional radiology suite. 68 year old male with PMH poor historian, HTN, HLD and alcohol abuse (6-7 beers 2-3x per week - last drink 6/10/23) reports that he recently had a stroke (5/2022) s/p cerebral angiogram and L supraclinoid ICA stent. He denies any residual neurologic deficits. Pt now presents to [facility] for scheduled cerebral angiogram with Dr. Medina on 6/15/23 at the interventional radiology suite.

## 2023-06-12 NOTE — H&P PST ADULT - PROBLEM SELECTOR PLAN 2
last drink on 6/10/23  pt advised to abstain from alcohol drinking 48 hours prior to surgery  CMP ordered and obtained at PST

## 2023-06-12 NOTE — H&P PST ADULT - OTHER CARE PROVIDERS
Dr. Dimas Gutierrez (Cardiologist) [phone] - last visit 9/29/22 for F/U; Dr. Jose Morales (Neuro) [phone] - last visit 5/31/23 for F/U

## 2023-06-15 ENCOUNTER — OUTPATIENT (OUTPATIENT)
Dept: OUTPATIENT SERVICES | Facility: HOSPITAL | Age: 68
LOS: 1 days | End: 2023-06-15
Payer: MEDICARE

## 2023-06-15 ENCOUNTER — TRANSCRIPTION ENCOUNTER (OUTPATIENT)
Age: 68
End: 2023-06-15

## 2023-06-15 ENCOUNTER — RESULT REVIEW (OUTPATIENT)
Age: 68
End: 2023-06-15

## 2023-06-15 ENCOUNTER — APPOINTMENT (OUTPATIENT)
Dept: NEUROSURGERY | Facility: HOSPITAL | Age: 68
End: 2023-06-15

## 2023-06-15 VITALS
HEART RATE: 64 BPM | OXYGEN SATURATION: 95 % | DIASTOLIC BLOOD PRESSURE: 92 MMHG | HEIGHT: 72 IN | RESPIRATION RATE: 20 BRPM | SYSTOLIC BLOOD PRESSURE: 161 MMHG | TEMPERATURE: 98 F | WEIGHT: 194.01 LBS

## 2023-06-15 VITALS
HEART RATE: 67 BPM | SYSTOLIC BLOOD PRESSURE: 109 MMHG | DIASTOLIC BLOOD PRESSURE: 59 MMHG | OXYGEN SATURATION: 96 % | RESPIRATION RATE: 14 BRPM

## 2023-06-15 DIAGNOSIS — Z98.890 OTHER SPECIFIED POSTPROCEDURAL STATES: Chronic | ICD-10-CM

## 2023-06-15 DIAGNOSIS — Z09 ENCOUNTER FOR FOLLOW-UP EXAMINATION AFTER COMPLETED TREATMENT FOR CONDITIONS OTHER THAN MALIGNANT NEOPLASM: ICD-10-CM

## 2023-06-15 DIAGNOSIS — Z98.89 OTHER SPECIFIED POSTPROCEDURAL STATES: Chronic | ICD-10-CM

## 2023-06-15 DIAGNOSIS — I63.9 CEREBRAL INFARCTION, UNSPECIFIED: ICD-10-CM

## 2023-06-15 DIAGNOSIS — Z96.652 PRESENCE OF LEFT ARTIFICIAL KNEE JOINT: Chronic | ICD-10-CM

## 2023-06-15 PROCEDURE — C1894: CPT

## 2023-06-15 PROCEDURE — 36227 PLACE CATH XTRNL CAROTID: CPT | Mod: 50

## 2023-06-15 PROCEDURE — 36224 PLACE CATH CAROTD ART: CPT

## 2023-06-15 PROCEDURE — C1887: CPT

## 2023-06-15 PROCEDURE — 36224 PLACE CATH CAROTD ART: CPT | Mod: 50

## 2023-06-15 PROCEDURE — C1769: CPT

## 2023-06-15 PROCEDURE — 36226 PLACE CATH VERTEBRAL ART: CPT

## 2023-06-15 PROCEDURE — 36227 PLACE CATH XTRNL CAROTID: CPT

## 2023-06-15 PROCEDURE — 36226 PLACE CATH VERTEBRAL ART: CPT | Mod: LT

## 2023-06-15 RX ORDER — ASPIRIN/CALCIUM CARB/MAGNESIUM 324 MG
1 TABLET ORAL
Refills: 0 | DISCHARGE

## 2023-06-15 RX ORDER — ASPIRIN/CALCIUM CARB/MAGNESIUM 324 MG
325 TABLET ORAL ONCE
Refills: 0 | Status: COMPLETED | OUTPATIENT
Start: 2023-06-15 | End: 2023-06-15

## 2023-06-15 RX ORDER — SODIUM CHLORIDE 9 MG/ML
1000 INJECTION, SOLUTION INTRAVENOUS
Refills: 0 | Status: DISCONTINUED | OUTPATIENT
Start: 2023-06-15 | End: 2023-06-29

## 2023-06-15 RX ADMIN — Medication 325 MILLIGRAM(S): at 08:30

## 2023-06-15 NOTE — ASU PATIENT PROFILE, ADULT - NSICDXPASTMEDICALHX_GEN_ALL_CORE_FT
PAST MEDICAL HISTORY:  Alcohol abuse     Cerebral infarction, unspecified     CVA (cerebrovascular accident)     Elevated liver enzymes s/p tylenol use after TKR    Glaucoma     Knee pain, left s/p TKR 8/15    Osteoarthritis     Poor historian

## 2023-06-15 NOTE — PACU DISCHARGE NOTE - THE ANESTHESIA ORDERS USED IN THE PACU ORDER SET WILL BE DISCONTINUED UPON TRANSFER OF THIS PATIENT
Transition of Care (YAJAIRA) Plan:     Chart reviewed, met with pt and family in room. Pt planning discharge home tomorrow, spouse will be home to assist. [address] offered, pt chose [id number] for follow up; referral placed with CMS. Pt has RW for home. No other needs at this time, CM remains available. YAJAIRA Transportation:   How is patient being transported at discharge? Family/Friend      When? Once cleared by Therapy between 12-2pm     Is transport scheduled? N/A      Follow-up appointment and transportation:   PCP/Specialist?  See AVS for Appointment         Who is transporting to the follow-up appointment? Family/Friend      Is transport for follow up appointment scheduled? N/A    Communication plan (with patient/family): Who is being called? Patient or Next of Kin? Responsible party? Patient      What number(s) is to be used? See Facesheet      What service provider is calling for [facility] services? When are they calling? 24-48 hours following discharge    Readmission Risk? (Green/Low; Yellow/Moderate; Red/High):  Green    Care Management Interventions  PCP Verified by CM:  Yes  Transition of Care Consult (CM Consult): [address]: No  Reason Outside [place]: Physician referred to specific agency([facility])  Discharge Durable Medical Equipment: No  Physical Therapy Consult: Yes  Occupational Therapy Consult: Yes  Current Support Network: Lives with Spouse, Own Home  Confirm Follow Up Transport: Family  Plan discussed with Pt/Family/Caregiver: Yes  Freedom of Choice Offered: Yes  Discharge Location  Discharge Placement: Home with home health
Statement Selected
Statement Selected

## 2023-06-15 NOTE — CHART NOTE - NSCHARTNOTEFT_GEN_A_CORE
Interventional Neuro- Radiology   Procedure Note PA-C    Procedure: Selective Cerebral Angiography   Pre- Procedure Diagnosis: ICA stenosis   Post- Procedure Diagnosis:    : Dr Roge Medina  Fellow:   Physician Assistant: Jocelynn Freeman PA-C    Nurse:  Radiologic Tech:  Anesthesiologist:  Sheath:      I/Os: EBL less than 10cc  IV fluids:     cc     Urine output     cc    Contrast Omnipaque 240      cc             Vitals: BP         HR      Spo2     %          Preliminary Report:  Using a 5 [place]ic long sheath to the right groin under MAC sedation via left vertebral artery,  left internal carotid artery, left external carotid artery, right vertebral artery, right internal carotid artery, right external carotid artery a selective cerebral angiography was performed and demonstrated                       Official note to follow.  Patient tolerated procedure well, hemodynamically stable, no change in neurological status compared to baseline.  Results discussed with neuro ICU team, patient and patient's family. Right groin sheath was removed, manual compression held to hemostasis for 20 minutes, no active bleeding, no hematoma, quick clot and safeguard balloon dressing applied at Interventional Neuro- Radiology   Procedure Note PA-C    Procedure: Catheter Cerebral Angiogram    Pre- Procedure Diagnosis: ICA stenosis   Post- Procedure Diagnosis:     : Dr Roge Medina  Fellow:    Dr Paulino Irby   Physician Assistant: Jocelynn Freeman PA-C    Nurse:                  Amena Perdomo RN  Radiologic Tech:  Tomás Noonan LRT,  Rasheeda Miller LRT  Anesthesiologist: Dr Zeeshan Duffy   Sheath:                5 Sinhala short sheath       I/Os: EBL less than 10cc  IV fluids: 50cc  Urine due to void  Contrast Omnipaque 240  56cc           Vitals: /70       HR 70      Spo2 100%          Preliminary Report:  Using a 5 Sinhala short sheath to the right groin under MAC sedation a catheter cerebral angiogram was performed and demonstrated Official note to follow.  Patient tolerated procedure well, hemodynamically stable, no change in neurological status compared to baseline. Results discussed with patient and patient's family. Right groin sheath was removed, a Vascade device and manual compression held to hemostasis for 20 minutes, no active bleeding, no hematoma, quick clot and safeguard balloon dressing applied at 09:45am.

## 2023-06-15 NOTE — CHART NOTE - NSCHARTNOTEFT_GEN_A_CORE
Interventional Neuro Radiology  Pre-Procedure Note PA-C    This is a 68y ____ hand dominant Male          Allergies: No Known Allergies  PMHX: Glaucoma, Osteoarthritis, Left Knee pain,  Elevated liver enzymes,   PSHX: total left knee replacement        S/P colonoscopy with polypectomy  benign, 2 years ago      History of cerebral angiography      H/O left inguinal hernia repair      H/O right inguinal hernia repair          Social History:     FAMILY HISTORY:  Family history of brain cancer (Mother)    Family history of heart disease (Father)  cancer?        Current Medications:     Labs:                   Blood Bank: 06-12-23  B  --  Positive        Assessment/Plan:     This is a 68y  year old right  hand dominant Male  presents with   Patient presents to neuro-IR for selective cerebral angiography.   Procedure, goals, risks, benefits and alternatives  were discussed with patient and patient's family.  All questions were answered.  Risks include but are not limited to stroke, vessel injury, hemorrhage, and or right  groin hematoma.  Patient demonstrates understanding  of all risks involved with this procedure and wishes to continue.   Appropriate  content was obtained from patient and consent is in the patient's chart. Interventional Neuro Radiology  Pre-Procedure Note PA-C    This is a 68 year old left hand dominant male          Allergies: No Known Allergies  PMHX: Glaucoma, Osteoarthritis, Left Knee pain,  Elevated liver enzymes,   PSHX: total left knee replacement        S/P colonoscopy with polypectomy  benign, 2 years ago      History of cerebral angiography and stent placement left inguinal hernia repair, right inguinal hernia repair  Social History:   FAMILY HISTORY:  Current Medications:     Labs:                   Blood Bank:  B positive       Assessment/Plan:   This is a 68 year old left hand dominant male s/post resolute stent placement, for ICA stenosis 5/2022, who returns to Neuro-IR for a catheter cerebral angiogram. Procedure, goals, risks, benefits and alternatives were discussed with patient. All questions were answered. Risks include but are not limited to stroke, vessel injury, hemorrhage, and or right groin hematoma. Patient demonstrates understanding of all risks involved with this procedure and wishes to continue.   Appropriate consent was obtained from patient and consent is in the patient's chart. Interventional Neuro Radiology  Pre-Procedure Note PA-C      This is a 68 year old left hand dominant male with intracranial stenosis, status post resolute stent placement, who returns to Neuro IR for a follow up catheter cerebral angiogram, to monitor stenosis.        Allergies: No Known Allergies  PMHX: Glaucoma, Osteoarthritis, Left Knee pain,  Elevated liver enzymes,   PSHX: Total left knee replacement, colonoscopy with polypectomy, cerebral angiography and stent placement left inguinal hernia repair, right inguinal hernia repair  Social History: +Significant other   FAMILY HISTORY: Non-contributory   Current Medications:     CBC:           15.7  11.58  45.0    237    136  101  20    4.7   19  0.90  103    Blood Bank:  B positive     Assessment/Plan:   This is a 68 year old left hand dominant male s/post resolute stent placement, for ICA stenosis 5/2022, who returns to Neuro-IR for a catheter cerebral angiogram. Procedure, goals, risks, benefits and alternatives were discussed with patient. All questions were answered. Risks include but are not limited to stroke, vessel injury, hemorrhage, and or right groin hematoma. Patient demonstrates understanding of all risks involved with this procedure and wishes to continue. Appropriate consent was obtained from patient and consent is in the patient's chart.

## 2023-06-15 NOTE — ASU DISCHARGE PLAN (ADULT/PEDIATRIC) - NURSING INSTRUCTIONS
Please feel free to contact us at [phone] if any problems arise. After 6PM, Monday through Friday, on weekends and on holidays, please call [phone] and ask for the radiology resident on call to be paged.

## 2023-06-15 NOTE — ASU DISCHARGE PLAN (ADULT/PEDIATRIC) - CARE PROVIDER_API CALL
Roge Medina  Neurosurgery  [address]  [place] [address]  Phone: [phone]  Fax: [phone]  Follow Up Time:

## 2023-06-15 NOTE — PACU DISCHARGE NOTE - NS MD DISCHARGE NOTE DISCHARGE
Home
Quality 130: Documentation Of Current Medications In The Medical Record: Current Medications Documented
Detail Level: Detailed

## 2023-06-30 ENCOUNTER — OUTPATIENT (OUTPATIENT)
Dept: OUTPATIENT SERVICES | Facility: HOSPITAL | Age: 68
LOS: 1 days | End: 2023-06-30
Payer: MEDICARE

## 2023-06-30 ENCOUNTER — TRANSCRIPTION ENCOUNTER (OUTPATIENT)
Age: 68
End: 2023-06-30

## 2023-06-30 ENCOUNTER — RESULT REVIEW (OUTPATIENT)
Age: 68
End: 2023-06-30

## 2023-06-30 ENCOUNTER — APPOINTMENT (OUTPATIENT)
Dept: RADIOLOGY | Facility: HOSPITAL | Age: 68
End: 2023-06-30
Payer: MEDICARE

## 2023-06-30 DIAGNOSIS — Z98.89 OTHER SPECIFIED POSTPROCEDURAL STATES: Chronic | ICD-10-CM

## 2023-06-30 DIAGNOSIS — Z98.890 OTHER SPECIFIED POSTPROCEDURAL STATES: Chronic | ICD-10-CM

## 2023-06-30 DIAGNOSIS — T14.8XXA OTHER INJURY OF UNSPECIFIED BODY REGION, INITIAL ENCOUNTER: ICD-10-CM

## 2023-06-30 DIAGNOSIS — Z96.652 PRESENCE OF LEFT ARTIFICIAL KNEE JOINT: Chronic | ICD-10-CM

## 2023-06-30 PROBLEM — I63.9 CEREBRAL INFARCTION, UNSPECIFIED: Chronic | Status: ACTIVE | Noted: 2023-06-12

## 2023-06-30 PROBLEM — Z78.9 OTHER SPECIFIED HEALTH STATUS: Chronic | Status: ACTIVE | Noted: 2023-06-12

## 2023-06-30 PROBLEM — F10.10 ALCOHOL ABUSE, UNCOMPLICATED: Chronic | Status: ACTIVE | Noted: 2023-06-12

## 2023-06-30 PROCEDURE — 73140 X-RAY EXAM OF FINGER(S): CPT | Mod: 26,LT

## 2023-06-30 PROCEDURE — 73140 X-RAY EXAM OF FINGER(S): CPT

## 2023-08-27 ENCOUNTER — RX RENEWAL (OUTPATIENT)
Age: 68
End: 2023-08-27

## 2023-10-27 ENCOUNTER — RX RENEWAL (OUTPATIENT)
Age: 68
End: 2023-10-27

## 2023-10-27 RX ORDER — LISINOPRIL 10 MG/1
10 TABLET ORAL
Qty: 90 | Refills: 3 | Status: ACTIVE | COMMUNITY
Start: 1900-01-01 | End: 1900-01-01

## 2023-11-27 ENCOUNTER — RX RENEWAL (OUTPATIENT)
Age: 68
End: 2023-11-27

## 2023-12-04 ENCOUNTER — APPOINTMENT (OUTPATIENT)
Dept: NEUROLOGY | Facility: CLINIC | Age: 68
End: 2023-12-04

## 2024-01-08 ENCOUNTER — APPOINTMENT (OUTPATIENT)
Dept: MRI IMAGING | Facility: HOSPITAL | Age: 69
End: 2024-01-08

## 2024-02-26 ENCOUNTER — RX RENEWAL (OUTPATIENT)
Age: 69
End: 2024-02-26

## 2024-02-26 RX ORDER — ATORVASTATIN CALCIUM 80 MG/1
80 TABLET, FILM COATED ORAL
Qty: 90 | Refills: 0 | Status: ACTIVE | COMMUNITY
Start: 1900-01-01 | End: 1900-01-01

## 2024-03-07 NOTE — PRE-ANESTHESIA EVALUATION ADULT - NSANTHSUBSTSD_GEN_ALL_CORE
Pt arrives to ed w c/o left hip pain. Pt reports pain for a month. Denies injury. Reports she has had lower back issues but this is a different feeling. Pt reports she can still walk but having issues bending over.    No

## 2024-05-22 ENCOUNTER — APPOINTMENT (OUTPATIENT)
Dept: MRI IMAGING | Facility: HOSPITAL | Age: 69
End: 2024-05-22

## 2024-05-22 ENCOUNTER — OUTPATIENT (OUTPATIENT)
Dept: OUTPATIENT SERVICES | Facility: HOSPITAL | Age: 69
LOS: 1 days | End: 2024-05-22
Payer: MEDICARE

## 2024-05-22 DIAGNOSIS — Z98.89 OTHER SPECIFIED POSTPROCEDURAL STATES: Chronic | ICD-10-CM

## 2024-05-22 DIAGNOSIS — Z98.890 OTHER SPECIFIED POSTPROCEDURAL STATES: Chronic | ICD-10-CM

## 2024-05-22 DIAGNOSIS — I65.22 OCCLUSION AND STENOSIS OF LEFT CAROTID ARTERY: ICD-10-CM

## 2024-05-22 DIAGNOSIS — Z96.652 PRESENCE OF LEFT ARTIFICIAL KNEE JOINT: Chronic | ICD-10-CM

## 2024-05-22 DIAGNOSIS — I63.9 CEREBRAL INFARCTION, UNSPECIFIED: ICD-10-CM

## 2024-05-22 PROCEDURE — 70544 MR ANGIOGRAPHY HEAD W/O DYE: CPT

## 2024-05-22 PROCEDURE — 70544 MR ANGIOGRAPHY HEAD W/O DYE: CPT | Mod: 26,59

## 2024-05-22 PROCEDURE — 70551 MRI BRAIN STEM W/O DYE: CPT | Mod: 26

## 2024-05-22 PROCEDURE — 70551 MRI BRAIN STEM W/O DYE: CPT

## 2024-05-28 ENCOUNTER — NON-APPOINTMENT (OUTPATIENT)
Age: 69
End: 2024-05-28

## 2024-05-29 ENCOUNTER — APPOINTMENT (OUTPATIENT)
Dept: NEUROSURGERY | Facility: CLINIC | Age: 69
End: 2024-05-29
Payer: MEDICARE

## 2024-05-29 VITALS
WEIGHT: 195 LBS | BODY MASS INDEX: 26.41 KG/M2 | HEART RATE: 79 BPM | SYSTOLIC BLOOD PRESSURE: 95 MMHG | TEMPERATURE: 98.1 F | DIASTOLIC BLOOD PRESSURE: 59 MMHG | HEIGHT: 72 IN | OXYGEN SATURATION: 95 %

## 2024-05-29 DIAGNOSIS — I63.512 CEREBRAL INFARCTION DUE TO UNSPECIFIED OCCLUSION OR STENOSIS OF LEFT MIDDLE CEREBRAL ARTERY: ICD-10-CM

## 2024-05-29 DIAGNOSIS — I67.9 CEREBROVASCULAR DISEASE, UNSPECIFIED: ICD-10-CM

## 2024-05-29 DIAGNOSIS — I63.9 CEREBRAL INFARCTION, UNSPECIFIED: ICD-10-CM

## 2024-05-29 PROCEDURE — 99214 OFFICE O/P EST MOD 30 MIN: CPT

## 2024-05-29 RX ORDER — PREDNISONE 20 MG/1
20 TABLET ORAL
Qty: 9 | Refills: 0 | Status: DISCONTINUED | COMMUNITY
Start: 2023-06-01 | End: 2024-05-29

## 2024-05-29 NOTE — HISTORY OF PRESENT ILLNESS
[FreeTextEntry1] : CAROLINE ROBERTO is a 69 year old left hand dominant male with PMH of macular degeneration, EtOH use, gout, who returned from [place] on 5/21/22 "not feeling well," presented to [facility] with acute expressive aphasia, CTH showed L MCA territory infarct in L frontal lobe, CTA showed severe focal stenosis of left supraclinoid ICA, transferred to [facility] for tertiary stroke treatment on 5/22. Diagnostic Cerebral angiogram on 5/23/22 demonstrated severe stenosis with near complete occlusion of the supraclinoid left ICA causing intracranial flow limitation. On 5/26/22, acute worsening right sided weakness s/p intracranial stenting of the left supraclinoid ICA using Resolute drug eluting stent resulting in significant improvement in vessel caliber and flow throughout the left MCA territory. There is residual moderate to severe narrowing within the proximal M1 segment of the left MCA. Discharged home on aspirin 81mg daily and Brilinta 60mg BID.  On 6/15/23, he underwent follow up cerebral angiogram demonstrating severe left M1 stenosis of the middle cerebral artery with worsening distal flow limitation compared to the prior angiogram. Resolution of severe supraclinoid left internal carotid artery stenosis status post [place] Resolute stenting with no evidence of in stent stenosis. Spoke with [place], sister-in-law, regarding plan for continued medical management with ASA 325mg daily and repeat MRI head non con, MRA head non con NOVA in 6 months (December 2023) since patient doing well symptomatically.   Today, he presents for follow up to review results of MRI/MRA brain NOVA obtained on 5/22/24. Compliant on ASA 325mg daily, tolerating well.  No new neurologic complaints since our last visit. He is able to ambulate independently without assistance. Denies weakness, numbness, tingling, and other symptoms of motor, sensory, speech, or visual abnormalities.

## 2024-05-29 NOTE — REASON FOR VISIT
[Follow-Up: _____] : a [unfilled] follow-up visit [Family Member] : family member [FreeTextEntry1] : Reviewed MRI brain non con, MRA brain non con NOVA done 5/22/24 s/p follow up cerebral angiogram 6/15/23   s/p cerebral angiogram and intracranial stenting of the stenotic left supraclinoid ICA using a 2.25 x 8 mm RESOLUTE drug eluting stent 5/26/22 s/p diagnostic cerebral angiogram 5/23/22

## 2024-05-29 NOTE — ASSESSMENT
[FreeTextEntry1] : IMPRESSION: 69M with PMH of macular degeneration, EtOH abuse p/w expressive aphasia, found to have L MCA infarct, ischemic stroke in the setting of L supraclinoid ICA severe stenosis s/p dx angio 5/23/22 demonstrating diffuse intracranial atherosclerotic disease, most severe within L ICA supraclinoid segment where there is a focal point of near complete occlusion just proximal to the ICA terminus resulting in significant flow limitation. 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 Exam worse 5/26/22 with R hemiparesis, s/p successful L supraclinoid ICA drug eluting stent with improvement in flow throughout L MCA territory but residual moderate to severe narrowing within the proximal M1 segment of the L MCA. Discharged on ASA 81 and Brilinta 60mg BID. s/p f/u angio 6/15/23 demonstrating severe left M1 stenosis of the middle cerebral artery with worsening distal flow limitation compared to the prior angiogram. Resolution of severe supraclinoid left internal carotid artery stenosis status post [place] Resolute stenting with no evidence of in stent stenosis. Maintained on ASA 325mg daily. Discussed at multi-disciplinary neurovascular conference where it was decided to continue medical management since he is now asymptomatic, and risks of intracranial stenting are significant.   Repeat MRI head non con, MRA head non con NOVA 5/22/24 is essentially unchanged compared to last year. There is severe left M1 stenosis without significant change. Mild atrophy. Old left frontal cortical infarct. No change since 5/23/2023. Severe left M1 stenosis without significant change using noninvasive flow MR angiography.  Since he is doing so well symptomatically, it is hard to justify to take the risks of additional intervention and another stent placement.    PLAN: Continue ASA 325mg daily Continue follow up with vascular neurology Follow up with PCP for cholesterol, BP management Repeat MRA head non con NOVA, MRI head non con in 1 year - May 2025 Follow up after for review

## 2024-05-29 NOTE — PHYSICAL EXAM
[Person] : oriented to person [Place] : oriented to place [Time] : oriented to time [Motor Tone] : muscle tone was normal in all four extremities [Motor Strength] : muscle strength was normal in all four extremities [Abnormal Walk] : normal gait

## 2024-07-18 NOTE — ED ADULT NURSE NOTE - NSIMPLEMENTINTERV_GEN_ALL_ED
No
Implemented All Universal Safety Interventions:  [place] to call system. Call bell, personal items and telephone within reach. Instruct patient to call for assistance. Room bathroom lighting operational. Non-slip footwear when patient is off stretcher. Physically safe environment: no spills, clutter or unnecessary equipment. Stretcher in lowest position, wheels locked, appropriate side rails in place.

## 2024-07-22 ENCOUNTER — LABORATORY RESULT (OUTPATIENT)
Age: 69
End: 2024-07-22

## 2024-07-22 ENCOUNTER — APPOINTMENT (OUTPATIENT)
Dept: FAMILY MEDICINE | Facility: CLINIC | Age: 69
End: 2024-07-22
Payer: MEDICARE

## 2024-07-22 VITALS
DIASTOLIC BLOOD PRESSURE: 70 MMHG | SYSTOLIC BLOOD PRESSURE: 116 MMHG | WEIGHT: 195 LBS | HEART RATE: 68 BPM | HEIGHT: 72 IN | RESPIRATION RATE: 20 BRPM | BODY MASS INDEX: 26.41 KG/M2

## 2024-07-22 DIAGNOSIS — H26.9 UNSPECIFIED CATARACT: ICD-10-CM

## 2024-07-22 DIAGNOSIS — Z86.73 PERSONAL HISTORY OF TRANSIENT ISCHEMIC ATTACK (TIA), AND CEREBRAL INFARCTION W/OUT RESIDUAL DEFICITS: ICD-10-CM

## 2024-07-22 DIAGNOSIS — I10 ESSENTIAL (PRIMARY) HYPERTENSION: ICD-10-CM

## 2024-07-22 DIAGNOSIS — Z00.00 ENCOUNTER FOR GENERAL ADULT MEDICAL EXAMINATION W/OUT ABNORMAL FINDINGS: ICD-10-CM

## 2024-07-22 DIAGNOSIS — Z23 ENCOUNTER FOR IMMUNIZATION: ICD-10-CM

## 2024-07-22 PROCEDURE — 36415 COLL VENOUS BLD VENIPUNCTURE: CPT

## 2024-07-22 PROCEDURE — G0009: CPT

## 2024-07-22 PROCEDURE — 90677 PCV20 VACCINE IM: CPT

## 2024-07-22 PROCEDURE — G0439: CPT

## 2024-07-22 PROCEDURE — 93000 ELECTROCARDIOGRAM COMPLETE: CPT

## 2024-07-22 NOTE — COUNSELING
[de-identified] : Healthy eating and activities [None] : None [Good understanding] : Patient has a good understanding of lifestyle changes and steps needed to achieve self management goal

## 2024-07-22 NOTE — HEALTH RISK ASSESSMENT
[No] : In the past 12 months have you used drugs other than those required for medical reasons? No [No falls in past year] : Patient reported no falls in the past year [0] : 2) Feeling down, depressed, or hopeless: Not at all (0) [Never] : Never [Fully functional (bathing, dressing, toileting, transferring, walking, feeding)] : Fully functional (bathing, dressing, toileting, transferring, walking, feeding) [Fully functional (using the telephone, shopping, preparing meals, housekeeping, doing laundry, using] : Fully functional and needs no help or supervision to perform IADLs (using the telephone, shopping, preparing meals, housekeeping, doing laundry, using transportation, managing medications and managing finances) [With Patient/Caregiver] : , with patient/caregiver [Reviewed no changes] : Reviewed, no changes [Audit-CScore] : 0 [[id number]] : 0 [AdvancecareDate] : 07/24

## 2024-07-22 NOTE — ASSESSMENT
[FreeTextEntry1] : Hemodynamically stable with acceptable BP Lab profiles drawn in office and sent Note patient is medically clear for the planned procedure

## 2024-07-22 NOTE — HISTORY OF PRESENT ILLNESS
[FreeTextEntry1] : Requests comprehensive exam [de-identified] : Presents for comprehensive exam.  This will also serve as a medical clearance for upcoming cataract surgery.  Pt states feeling generally well.  Reviewed screening and immunizations - due for Prevnar 20 - pt in agreement.

## 2024-07-22 NOTE — PHYSICAL EXAM
[Normal Posterior Cervical Nodes] : no posterior cervical lymphadenopathy [Normal Anterior Cervical Nodes] : no anterior cervical lymphadenopathy [Normal] : no rash [Coordination Grossly Intact] : coordination grossly intact [No Focal Deficits] : no focal deficits [Normal Gait] : normal gait [Speech Grossly Normal] : speech grossly normal [Memory Grossly Normal] : memory grossly normal [Normal Affect] : the affect was normal [Alert and Oriented x3] : oriented to person, place, and time [Normal Mood] : the mood was normal [Normal Insight/Judgement] : insight and judgment were intact [de-identified] : small non-tender ventral hernia noted

## 2024-07-23 LAB
ALBUMIN SERPL ELPH-MCNC: 4 G/DL
ALP BLD-CCNC: 164 U/L
ALT SERPL-CCNC: 39 U/L
ANION GAP SERPL CALC-SCNC: 14 MMOL/L
APPEARANCE: CLEAR
AST SERPL-CCNC: 34 U/L
BILIRUB SERPL-MCNC: 0.4 MG/DL
BILIRUBIN URINE: NEGATIVE
BLOOD URINE: NEGATIVE
BUN SERPL-MCNC: 14 MG/DL
CALCIUM SERPL-MCNC: 8.8 MG/DL
CHLORIDE SERPL-SCNC: 102 MMOL/L
CHOLEST SERPL-MCNC: 113 MG/DL
CO2 SERPL-SCNC: 23 MMOL/L
COLOR: NORMAL
CREAT SERPL-MCNC: 1.16 MG/DL
EGFR: 67 ML/MIN/1.73M2
ESTIMATED AVERAGE GLUCOSE: 117 MG/DL
FOLATE SERPL-MCNC: 6.4 NG/ML
GLUCOSE QUALITATIVE U: NEGATIVE MG/DL
GLUCOSE SERPL-MCNC: 140 MG/DL
HBA1C MFR BLD HPLC: 5.7 %
HCT VFR BLD CALC: 45.6 %
HDLC SERPL-MCNC: 38 MG/DL
HGB BLD-MCNC: 15.9 G/DL
KETONES URINE: NEGATIVE MG/DL
LDLC SERPL CALC-MCNC: 53 MG/DL
LEUKOCYTE ESTERASE URINE: NEGATIVE
MCHC RBC-ENTMCNC: 30.4 PG
MCHC RBC-ENTMCNC: 34.9 GM/DL
MCV RBC AUTO: 87.2 FL
NITRITE URINE: NEGATIVE
NONHDLC SERPL-MCNC: 75 MG/DL
PH URINE: 5.5
PLATELET # BLD AUTO: 187 K/UL
POTASSIUM SERPL-SCNC: 3.5 MMOL/L
PROT SERPL-MCNC: 6.7 G/DL
PROTEIN URINE: 30 MG/DL
PSA SERPL-MCNC: 1.25 NG/ML
RBC # BLD: 5.23 M/UL
RBC # FLD: 12.5 %
SODIUM SERPL-SCNC: 140 MMOL/L
SPECIFIC GRAVITY URINE: >1.03
T4 FREE SERPL-MCNC: 1.1 NG/DL
TRIGL SERPL-MCNC: 115 MG/DL
TSH SERPL-ACNC: 2.39 UIU/ML
UROBILINOGEN URINE: 1 MG/DL
VIT B12 SERPL-MCNC: 537 PG/ML
WBC # FLD AUTO: 6.44 K/UL

## 2024-09-09 ENCOUNTER — RX RENEWAL (OUTPATIENT)
Age: 69
End: 2024-09-09

## 2024-09-10 NOTE — ED ADULT TRIAGE NOTE - PATIENT ON (OXYGEN DELIVERY METHOD)
If not improving over the next 6 hours go to the ER.    If fevers, persistent vomiting, confusion, disorientation or any worsening please go to the ER.      You may use zofran for nausea and vomiting every 8 hours.   Rest at home, hydrate.      Follow-up with your primary care provider or recheck if the lower abdomen discomfort persists as you have deferred additional work up today waiting to see if the improvement of the migraine, nausea, and vomiting may improve the lower abdomen discomfort.         room air

## 2024-10-04 ENCOUNTER — APPOINTMENT (OUTPATIENT)
Dept: RHEUMATOLOGY | Facility: CLINIC | Age: 69
End: 2024-10-04
Payer: MEDICARE

## 2024-10-04 VITALS
BODY MASS INDEX: 26.14 KG/M2 | RESPIRATION RATE: 18 BRPM | TEMPERATURE: 97.3 F | OXYGEN SATURATION: 99 % | HEART RATE: 70 BPM | DIASTOLIC BLOOD PRESSURE: 70 MMHG | SYSTOLIC BLOOD PRESSURE: 118 MMHG | WEIGHT: 193 LBS | HEIGHT: 72 IN

## 2024-10-04 DIAGNOSIS — M10.9 GOUT, UNSPECIFIED: ICD-10-CM

## 2024-10-04 PROCEDURE — G2211 COMPLEX E/M VISIT ADD ON: CPT

## 2024-10-04 PROCEDURE — 99214 OFFICE O/P EST MOD 30 MIN: CPT

## 2024-10-04 RX ORDER — COLCHICINE 0.6 MG/1
0.6 TABLET ORAL
Qty: 90 | Refills: 0 | Status: ACTIVE | COMMUNITY
Start: 2024-10-04 | End: 1900-01-01

## 2024-10-04 NOTE — PHYSICAL EXAM
[General Appearance - Alert] : alert [General Appearance - In No Acute Distress] : in no acute distress [Sclera] : the sclera and conjunctiva were normal [Outer Ear] : the ears and nose were normal in appearance [Edema] : there was no peripheral edema [No Spinal Tenderness] : no spinal tenderness [Abnormal Walk] : normal gait [Nail Clubbing] : no clubbing  or cyanosis of the fingernails [Musculoskeletal - Swelling] : no joint swelling seen [Motor Tone] : muscle strength and tone were normal [] : no rash [Oriented To Time, Place, And Person] : oriented to person, place, and time

## 2024-10-07 ENCOUNTER — OUTPATIENT (OUTPATIENT)
Dept: OUTPATIENT SERVICES | Facility: HOSPITAL | Age: 69
LOS: 1 days | End: 2024-10-07
Payer: MEDICARE

## 2024-10-07 ENCOUNTER — APPOINTMENT (OUTPATIENT)
Dept: RADIOLOGY | Facility: HOSPITAL | Age: 69
End: 2024-10-07
Payer: MEDICARE

## 2024-10-07 DIAGNOSIS — M10.9 GOUT, UNSPECIFIED: ICD-10-CM

## 2024-10-07 DIAGNOSIS — Z98.890 OTHER SPECIFIED POSTPROCEDURAL STATES: Chronic | ICD-10-CM

## 2024-10-07 DIAGNOSIS — Z98.89 OTHER SPECIFIED POSTPROCEDURAL STATES: Chronic | ICD-10-CM

## 2024-10-07 PROCEDURE — 73620 X-RAY EXAM OF FOOT: CPT

## 2024-10-07 PROCEDURE — 73620 X-RAY EXAM OF FOOT: CPT | Mod: 26,50

## 2024-10-07 PROCEDURE — 73630 X-RAY EXAM OF FOOT: CPT | Mod: 26,RT

## 2024-10-08 DIAGNOSIS — R79.89 OTHER SPECIFIED ABNORMAL FINDINGS OF BLOOD CHEMISTRY: ICD-10-CM

## 2024-10-08 LAB
ALBUMIN SERPL ELPH-MCNC: 4.5 G/DL
ALP BLD-CCNC: 143 U/L
ALT SERPL-CCNC: 50 U/L
ANION GAP SERPL CALC-SCNC: 19 MMOL/L
AST SERPL-CCNC: 32 U/L
BASOPHILS # BLD AUTO: 0.12 K/UL
BASOPHILS NFR BLD AUTO: 1.3 %
BILIRUB SERPL-MCNC: 0.6 MG/DL
BUN SERPL-MCNC: 17 MG/DL
CALCIUM SERPL-MCNC: 9.8 MG/DL
CHLORIDE SERPL-SCNC: 102 MMOL/L
CO2 SERPL-SCNC: 22 MMOL/L
CREAT SERPL-MCNC: 1.24 MG/DL
EGFR: 62 ML/MIN/1.73M2
EOSINOPHIL # BLD AUTO: 0.1 K/UL
EOSINOPHIL NFR BLD AUTO: 1.1 %
GLUCOSE SERPL-MCNC: 119 MG/DL
HCT VFR BLD CALC: 44.8 %
HGB BLD-MCNC: 14.8 G/DL
IMM GRANULOCYTES NFR BLD AUTO: 0.3 %
LYMPHOCYTES # BLD AUTO: 1.59 K/UL
LYMPHOCYTES NFR BLD AUTO: 17.8 %
MAN DIFF?: NORMAL
MCHC RBC-ENTMCNC: 29.2 PG
MCHC RBC-ENTMCNC: 33 GM/DL
MCV RBC AUTO: 88.4 FL
MONOCYTES # BLD AUTO: 0.57 K/UL
MONOCYTES NFR BLD AUTO: 6.4 %
NEUTROPHILS # BLD AUTO: 6.52 K/UL
NEUTROPHILS NFR BLD AUTO: 73.1 %
PLATELET # BLD AUTO: 245 K/UL
POTASSIUM SERPL-SCNC: 4 MMOL/L
PROT SERPL-MCNC: 7.1 G/DL
RBC # BLD: 5.07 M/UL
RBC # FLD: 14.8 %
SODIUM SERPL-SCNC: 143 MMOL/L
URATE SERPL-MCNC: 8 MG/DL
WBC # FLD AUTO: 8.93 K/UL

## 2024-10-22 DIAGNOSIS — R73.01 IMPAIRED FASTING GLUCOSE: ICD-10-CM

## 2024-11-27 NOTE — ED ADULT NURSE NOTE - NS PRO AD NO ADVANCE DIRECTIVE
Follow up lactation visit:    Met with Monserrat and her new baby to provide lactation support. Monserrat has been exclusively formula feeding due to feeding unwell. She desires to continue formula feeding at this time, but is interested in using a manual breast pump to assist with breast stimulation, as she is possibly interested in breastfeeding after she begins feeling better. We reviewed the supply and demand nature of the breastfeeding process. She is aware that regular, high quality breast stimulation is required for the development of a robust milk supply.    Manual breast pump to be ordered. Monserrat agrees to call for further lactation support, if she desires to resume latching baby at breast.       No

## 2024-12-26 ENCOUNTER — RX RENEWAL (OUTPATIENT)
Age: 69
End: 2024-12-26

## 2025-01-29 ENCOUNTER — RX RENEWAL (OUTPATIENT)
Age: 70
End: 2025-01-29

## 2025-04-03 NOTE — PROGRESS NOTE ADULT - SUBJECTIVE AND OBJECTIVE BOX
CC: f/u for  fever, leukocytosis  Patient reports  feels fine  REVIEW OF SYSTEMS:  All other review of systems negative (Comprehensive ROS)    Antimicrobials Day #  :    Other Medications Reviewed    T(F): 98.4 (05-28-22 @ 15:00), Max: 98.4 (05-27-22 @ 23:00)  HR: 65 (05-28-22 @ 17:00)  BP: 156/71 (05-28-22 @ 17:00)  RR: 20 (05-28-22 @ 17:00)  SpO2: 100% (05-28-22 @ 17:00)  Wt(kg): --    PHYSICAL EXAM:  General: alert, no acute distress  Eyes:  anicteric, no conjunctival injection, no discharge  Oropharynx: no lesions or injection 	  Neck: supple, without adenopathy  Lungs: clear to auscultation  Heart: regular rate and rhythm; no murmur, rubs or gallops  Abdomen: soft, nondistended, nontender, without mass or organomegaly  Skin: no lesions  Extremities: no clubbing, cyanosis, or edema  Neurologic: alert, oriented, moves all extremities  groin site no swelling  LAB RESULTS:                        11.8   16.42 )-----------( 181      ( 27 May 2022 04:21 )             34.4     05-28    140  |  110<H>  |  15  ----------------------------<  123<H>  3.7   |  22  |  0.79    Ca    8.5      28 May 2022 13:06  Phos  1.9     05-28  Mg     2.1     05-28          MICROBIOLOGY:  RECENT CULTURES:  05-27 @ 10:13 .Blood     Babesia microti PCR  Results: NOT detected  ***************Result Note*************  The detection of Babesia microti by PCR has only been  validated for whole blood; this test has not been approved  by the [place] Food and Drug Administration (FDA). Performance  characteristics of this assay have been determined by  [employer]. The clinical significance  of results should be considered in conjunction with the  overall clinical presentation of the patient. Result is not  intended to be used as the sole means for clinical diagnosis  or patient management decisions.  One negative sample does not necessarily rule  out the presence of a parasitic infection.      05-26 @ 17:28 .Blood Blood     NEGATIVE for Plasmodium antigens. Microscopy is performed for  confirmation.  This test does not detect the presence of Babesia species.  If Babesiosis is suspected, please order test for Babesia PCR: Babesia  microti PCR Bld  ************************************************************  No Blood Parasites observed by giemsa stain  One negative set of blood smears does not rule out  the possibility of a parasitic infection.  A minimum of 3  specimens should be collected, at least 12-24 hours apart,  over a 36 hour time period.      05-25 @ 02:11 Clean Catch Clean Catch (Midstream)     <10,000 CFU/mL Normal Urogenital Dariela      05-25 @ 01:36 .Blood Blood-Venous     No growth to date.          RADIOLOGY REVIEWED:  < from: MR Head No Cont (05.27.22 @ 13:28) >    ACC: [id number] EXAM:  MR BRAIN                        ACC: [id number] EXAM:  MR BRAIN WAW IC                          PROCEDURE DATE:  05/27/2022          INTERPRETATION:  CLINICAL INDICATION: Post angioplasty and stenting of   left supraclinoid internal carotid artery stenosis      Magnetic resonance imaging of the brain was carried out with transaxial   SPGR, FLAIR, fast spin echo T2 weighted images, axial susceptibility   weighted series, diffusion weighted series and sagittal T1 weighted   series on a 1.5 [place] magnet.    Comparison is made with the prior MRI/MRA of 5/23/2022, MRI and   conventional angiogram 5/26/2022.    The fourth, third and lateral ventricles are normal in size and position.   There is no hemorrhage, mass or shift of the midline structures. Left   frontal infarct is unchanged since 5/26/2022. Minimal linear   susceptibility is identified suggesting either a thrombosed vessel or   hemorrhage. No new infarcts are identified.        A 2 D and 3-D axial noncontrast MRAwere performed on the cervical and   intracranial vessels, respectively. Intravascular flow quantification was   performed using gated 2D phase contrast MR, imaged perpendicular to the   vessel axis.  Images were post processed NOVA software and a NOVA flow   study report is available.    In the interval since the prior exam there has been a stent placed in the   left supraclinoid internal carotid artery with significantly improved   flow in the left internal carotid artery as well as the left anterior   cerebral artery, flow in the middle cerebral artery is only slightly   improved which may be related to a left M1 stenosis.    MARIJA 187, RMCA 141, RACA 11, RACA2 72  compared with 5/23/2022  MARIJA 191, RMCA 117, RACA 16, RACA2 71.    LICA 148,LMCA 44, LACA 134, LACA2 78  compared with 5/23/2022  LICA 62, LMCA 36, LACA 35, LACA 2 30.    , , , RPCA 63, LPCA 131  compared with 5/23/2022  , , , RPCA 66, LPCA 194    IMPRESSION: No change in left frontal infarct compared with 5/26/2022.   Improved flow in the left internal carotid artery and left anterior   cerebral artery compared with 5/23/2022. Only mildly improved flow in the   left MCA is related to the presence of a left M1 stenosis.    --- End of Report ---      < from: Xray Chest 1 View- PORTABLE-Urgent (Xray Chest 1 View- PORTABLE-Urgent .) (05.25.22 @ 02:59) >  ACC: [id number] EXAM:  XR CHEST PORTABLE URGENT 1V                          PROCEDURE DATE:  05/25/2022          INTERPRETATION:  INDICATION: Fever    COMPARISON: Chest radiograph 7/30/2015    FINDINGS:  Heart/Vascular: The cardiac silhouette is normal in size.  Pulmonary: No focal consolidation, pleural effusion or pneumothorax.  Bones: The visualized osseous structures are unremarkable.    Impression:  Clear lungs.    --- End of Report ---      BLAISE GODFREY DO; Resident Radiologist  This document has been electronically signed.  JANEL ROSALES MD; Attending Radiologist  This document has been electronically signed. May 25 2022  1:49PM    < end of copied text >   end of copied text >        Assessment:  Patient admitted 5/22 with new onset aphasia, just returned from [facility] for 5 months, found to have left mca infarct, under went carotid stent placement, had fever that resolved, now some leukocytosis but exam and cultures are unrevealing for infection  Plan:  monitor off antibiotics  repeat cultures if leukocytosis persists No

## 2025-04-07 ENCOUNTER — RX RENEWAL (OUTPATIENT)
Age: 70
End: 2025-04-07

## 2025-05-21 ENCOUNTER — OUTPATIENT (OUTPATIENT)
Dept: OUTPATIENT SERVICES | Facility: HOSPITAL | Age: 70
LOS: 1 days | End: 2025-05-21
Payer: MEDICARE

## 2025-05-21 ENCOUNTER — APPOINTMENT (OUTPATIENT)
Dept: MRI IMAGING | Facility: HOSPITAL | Age: 70
End: 2025-05-21

## 2025-05-21 DIAGNOSIS — I67.9 CEREBROVASCULAR DISEASE, UNSPECIFIED: ICD-10-CM

## 2025-05-21 DIAGNOSIS — Z98.89 OTHER SPECIFIED POSTPROCEDURAL STATES: Chronic | ICD-10-CM

## 2025-05-21 DIAGNOSIS — Z96.652 PRESENCE OF LEFT ARTIFICIAL KNEE JOINT: Chronic | ICD-10-CM

## 2025-05-21 DIAGNOSIS — I63.512 CEREBRAL INFARCTION DUE TO UNSPECIFIED OCCLUSION OR STENOSIS OF LEFT MIDDLE CEREBRAL ARTERY: ICD-10-CM

## 2025-05-21 DIAGNOSIS — Z98.890 OTHER SPECIFIED POSTPROCEDURAL STATES: Chronic | ICD-10-CM

## 2025-05-21 DIAGNOSIS — I65.22 OCCLUSION AND STENOSIS OF LEFT CAROTID ARTERY: ICD-10-CM

## 2025-05-21 PROCEDURE — 70551 MRI BRAIN STEM W/O DYE: CPT

## 2025-05-21 PROCEDURE — 70544 MR ANGIOGRAPHY HEAD W/O DYE: CPT

## 2025-05-21 PROCEDURE — 70551 MRI BRAIN STEM W/O DYE: CPT | Mod: 26

## 2025-05-21 PROCEDURE — 70544 MR ANGIOGRAPHY HEAD W/O DYE: CPT | Mod: 26,XU

## 2025-05-28 ENCOUNTER — NON-APPOINTMENT (OUTPATIENT)
Age: 70
End: 2025-05-28

## 2025-05-28 ENCOUNTER — APPOINTMENT (OUTPATIENT)
Dept: NEUROSURGERY | Facility: CLINIC | Age: 70
End: 2025-05-28
Payer: MEDICARE

## 2025-05-28 VITALS
HEIGHT: 72 IN | SYSTOLIC BLOOD PRESSURE: 100 MMHG | DIASTOLIC BLOOD PRESSURE: 58 MMHG | RESPIRATION RATE: 16 BRPM | WEIGHT: 200 LBS | BODY MASS INDEX: 27.09 KG/M2 | HEART RATE: 57 BPM | OXYGEN SATURATION: 96 %

## 2025-05-28 DIAGNOSIS — I67.9 CEREBROVASCULAR DISEASE, UNSPECIFIED: ICD-10-CM

## 2025-05-28 DIAGNOSIS — I63.512 CEREBRAL INFARCTION DUE TO UNSPECIFIED OCCLUSION OR STENOSIS OF LEFT MIDDLE CEREBRAL ARTERY: ICD-10-CM

## 2025-05-28 PROCEDURE — 99214 OFFICE O/P EST MOD 30 MIN: CPT

## 2025-06-24 ENCOUNTER — APPOINTMENT (OUTPATIENT)
Dept: NEUROLOGY | Facility: CLINIC | Age: 70
End: 2025-06-24
Payer: MEDICARE

## 2025-06-24 VITALS
SYSTOLIC BLOOD PRESSURE: 125 MMHG | BODY MASS INDEX: 27.09 KG/M2 | DIASTOLIC BLOOD PRESSURE: 75 MMHG | HEIGHT: 72 IN | WEIGHT: 200 LBS | HEART RATE: 65 BPM

## 2025-06-24 PROCEDURE — 99215 OFFICE O/P EST HI 40 MIN: CPT

## 2025-07-23 ENCOUNTER — APPOINTMENT (OUTPATIENT)
Dept: FAMILY MEDICINE | Facility: CLINIC | Age: 70
End: 2025-07-23
Payer: MEDICARE

## 2025-07-23 VITALS
HEART RATE: 68 BPM | DIASTOLIC BLOOD PRESSURE: 70 MMHG | RESPIRATION RATE: 20 BRPM | HEIGHT: 72 IN | BODY MASS INDEX: 27.63 KG/M2 | SYSTOLIC BLOOD PRESSURE: 115 MMHG | WEIGHT: 204 LBS

## 2025-07-23 DIAGNOSIS — I10 ESSENTIAL (PRIMARY) HYPERTENSION: ICD-10-CM

## 2025-07-23 DIAGNOSIS — Z86.73 PERSONAL HISTORY OF TRANSIENT ISCHEMIC ATTACK (TIA), AND CEREBRAL INFARCTION W/OUT RESIDUAL DEFICITS: ICD-10-CM

## 2025-07-23 DIAGNOSIS — Z00.00 ENCOUNTER FOR GENERAL ADULT MEDICAL EXAMINATION W/OUT ABNORMAL FINDINGS: ICD-10-CM

## 2025-07-23 DIAGNOSIS — R73.01 IMPAIRED FASTING GLUCOSE: ICD-10-CM

## 2025-07-23 PROCEDURE — 81003 URINALYSIS AUTO W/O SCOPE: CPT | Mod: QW

## 2025-07-23 PROCEDURE — 93000 ELECTROCARDIOGRAM COMPLETE: CPT

## 2025-07-23 PROCEDURE — 36415 COLL VENOUS BLD VENIPUNCTURE: CPT

## 2025-07-23 PROCEDURE — G0439: CPT

## 2025-07-24 LAB
ALBUMIN SERPL ELPH-MCNC: 4.2 G/DL
ALP BLD-CCNC: 144 U/L
ALT SERPL-CCNC: 58 U/L
ANION GAP SERPL CALC-SCNC: 13 MMOL/L
APPEARANCE: CLEAR
AST SERPL-CCNC: 48 U/L
BILIRUB SERPL-MCNC: 0.5 MG/DL
BILIRUBIN URINE: NEGATIVE
BLOOD URINE: NEGATIVE
BUN SERPL-MCNC: 19 MG/DL
CALCIUM SERPL-MCNC: 9.1 MG/DL
CHLORIDE SERPL-SCNC: 106 MMOL/L
CHOLEST SERPL-MCNC: 140 MG/DL
CO2 SERPL-SCNC: 22 MMOL/L
COLOR: YELLOW
CREAT SERPL-MCNC: 1.05 MG/DL
EGFRCR SERPLBLD CKD-EPI 2021: 75 ML/MIN/1.73M2
ESTIMATED AVERAGE GLUCOSE: 111 MG/DL
FOLATE SERPL-MCNC: 6.8 NG/ML
GLUCOSE QUALITATIVE U: NEGATIVE MG/DL
GLUCOSE SERPL-MCNC: 151 MG/DL
HBA1C MFR BLD HPLC: 5.5 %
HCT VFR BLD CALC: 44.5 %
HDLC SERPL-MCNC: 50 MG/DL
HGB BLD-MCNC: 15.4 G/DL
KETONES URINE: ABNORMAL MG/DL
LDLC SERPL-MCNC: 66 MG/DL
LEUKOCYTE ESTERASE URINE: NEGATIVE
MCHC RBC-ENTMCNC: 30.4 PG
MCHC RBC-ENTMCNC: 34.6 G/DL
MCV RBC AUTO: 87.8 FL
NITRITE URINE: NEGATIVE
NONHDLC SERPL-MCNC: 90 MG/DL
PH URINE: 5.5
PLATELET # BLD AUTO: 208 K/UL
POTASSIUM SERPL-SCNC: 4.2 MMOL/L
PROT SERPL-MCNC: 6.5 G/DL
PROTEIN URINE: NORMAL MG/DL
PSA SERPL-MCNC: 1.34 NG/ML
RBC # BLD: 5.07 M/UL
RBC # FLD: 13.2 %
SODIUM SERPL-SCNC: 141 MMOL/L
SPECIFIC GRAVITY URINE: 1.03
T4 FREE SERPL-MCNC: 1 NG/DL
TRIGL SERPL-MCNC: 139 MG/DL
TSH SERPL-ACNC: 2.29 UIU/ML
URATE SERPL-MCNC: 7.2 MG/DL
UROBILINOGEN URINE: 1 MG/DL
VIT B12 SERPL-MCNC: 403 PG/ML
WBC # FLD AUTO: 8.15 K/UL

## 2025-08-02 ENCOUNTER — RX RENEWAL (OUTPATIENT)
Age: 70
End: 2025-08-02